# Patient Record
Sex: FEMALE | Race: WHITE | Employment: OTHER | URBAN - NONMETROPOLITAN AREA
[De-identification: names, ages, dates, MRNs, and addresses within clinical notes are randomized per-mention and may not be internally consistent; named-entity substitution may affect disease eponyms.]

---

## 2020-04-07 ENCOUNTER — HOSPITAL ENCOUNTER (EMERGENCY)
Age: 85
Discharge: HOME | End: 2020-04-07
Attending: EMERGENCY MEDICINE
Payer: MEDICARE

## 2020-04-07 ENCOUNTER — APPOINTMENT (OUTPATIENT)
Dept: CT IMAGING | Age: 85
End: 2020-04-07
Payer: MEDICARE

## 2020-04-07 VITALS
SYSTOLIC BLOOD PRESSURE: 143 MMHG | RESPIRATION RATE: 16 BRPM | TEMPERATURE: 96.4 F | HEART RATE: 69 BPM | HEIGHT: 66 IN | BODY MASS INDEX: 27.32 KG/M2 | WEIGHT: 170 LBS | OXYGEN SATURATION: 95 % | DIASTOLIC BLOOD PRESSURE: 74 MMHG

## 2020-04-07 PROCEDURE — 72125 CT NECK SPINE W/O DYE: CPT

## 2020-04-07 PROCEDURE — 90471 IMMUNIZATION ADMIN: CPT | Performed by: EMERGENCY MEDICINE

## 2020-04-07 PROCEDURE — 6370000000 HC RX 637 (ALT 250 FOR IP): Performed by: EMERGENCY MEDICINE

## 2020-04-07 PROCEDURE — 99283 EMERGENCY DEPT VISIT LOW MDM: CPT

## 2020-04-07 PROCEDURE — 12001 RPR S/N/AX/GEN/TRNK 2.5CM/<: CPT

## 2020-04-07 PROCEDURE — 70450 CT HEAD/BRAIN W/O DYE: CPT

## 2020-04-07 PROCEDURE — 90715 TDAP VACCINE 7 YRS/> IM: CPT | Performed by: EMERGENCY MEDICINE

## 2020-04-07 PROCEDURE — 6360000002 HC RX W HCPCS: Performed by: EMERGENCY MEDICINE

## 2020-04-07 RX ORDER — CEPHALEXIN 500 MG/1
500 CAPSULE ORAL 3 TIMES DAILY
Qty: 15 CAPSULE | Refills: 0 | Status: SHIPPED | OUTPATIENT
Start: 2020-04-07 | End: 2022-01-01

## 2020-04-07 RX ORDER — CEPHALEXIN 250 MG/1
500 CAPSULE ORAL ONCE
Status: COMPLETED | OUTPATIENT
Start: 2020-04-07 | End: 2020-04-07

## 2020-04-07 RX ADMIN — TETANUS TOXOID, REDUCED DIPHTHERIA TOXOID AND ACELLULAR PERTUSSIS VACCINE, ADSORBED 0.5 ML: 5; 2.5; 8; 8; 2.5 SUSPENSION INTRAMUSCULAR at 05:27

## 2020-04-07 RX ADMIN — CEPHALEXIN 500 MG: 250 CAPSULE ORAL at 05:27

## 2020-04-07 ASSESSMENT — ENCOUNTER SYMPTOMS
COUGH: 0
BACK PAIN: 0
DIARRHEA: 0
SORE THROAT: 0
EYE PAIN: 0
SHORTNESS OF BREATH: 0
RHINORRHEA: 0
NAUSEA: 0
ABDOMINAL PAIN: 0
VOMITING: 0

## 2020-04-07 NOTE — ED NOTES
3 sutures placed to back of patient's head by Dr. Myriam Dawn to control bleeding.      Latasha Hurtado RN  04/07/20 0729

## 2020-04-07 NOTE — ED TRIAGE NOTES
Patient presents to ED from private auto via wheelchair for a head lac secondary to a fall. The patient states that she got up to use the bathroom and lost her balance. She fell backwards and struck her head on the sink. She denies LOC or use of blood thinning medications. She is alert and oriented on arrival.  Denies pain at this time.

## 2020-04-07 NOTE — ED PROVIDER NOTES
immediate return. The patient was advised to keep the wound clean and dry, they may apply antibiotic ointment to the wound. The patient presents with a closed head injury. The patient is neurologically intact. The presentation does not suggest a serious head injury. Signs and symptoms of a serious head injury have been discussed with the patient and caregiver, who will be vigilant for these. Concerns of repeat head injury have also been discussed. The patient has been observed adequately in the ED. Pt has been instructed to return to the ED if symptoms do not go away or worsen or change in any way. The patient understands that at this time there is no evidence for a more malignant underlying process, but the patient also understands that early in the process of an illness or injury, an emergency department workup can be falsely reassuring. Routine discharge counseling was given, and the patient understands that worsening, changing or persistent symptoms should prompt an immediate call or follow up with their primary physician or return to the emergency department. The importance of appropriate follow up was also discussed. I have reviewed the disposition diagnosis with the patient and or their family/guardian. I have answered their questions and given discharge instructions. They voiced understanding of these instructions and did not have any further questions or complaints. CONSULTS:    None    CRITICAL CARE:     None    PROCEDURES:    Lac Repair  Date/Time: 4/7/2020 4:15 AM  Performed by: Celestino Garcia DO  Authorized by: Celestino Garcia DO     Consent:     Consent obtained:  Verbal    Consent given by:  Patient    Risks discussed:  Pain and infection  Anesthesia (see MAR for exact dosages):      Anesthesia method:  None (emergent )  Laceration details:     Location:  Scalp    Scalp location:  Occipital    Length (cm):  2    Depth (mm):  7  Repair type:     Repair type:

## 2021-01-22 ENCOUNTER — IMMUNIZATION (OUTPATIENT)
Dept: LAB | Age: 86
End: 2021-01-22
Payer: MEDICARE

## 2021-01-22 PROCEDURE — 0011A COVID-19, MODERNA VACCINE 100MCG/0.5ML DOSE: CPT

## 2021-02-19 ENCOUNTER — IMMUNIZATION (OUTPATIENT)
Dept: LAB | Age: 86
End: 2021-02-19
Payer: MEDICARE

## 2021-02-19 PROCEDURE — 0012A: CPT

## 2021-02-19 PROCEDURE — 91301 HC SARSCOV2 VAC 100MCG/0.5ML IM: CPT

## 2021-02-19 PROCEDURE — 91301 COVID-19, MODERNA VACCINE 100MCG/0.5ML DOSE: CPT

## 2022-01-01 ENCOUNTER — TELEPHONE (OUTPATIENT)
Dept: INTERNAL MEDICINE | Age: 87
End: 2022-01-01
Payer: MEDICARE

## 2022-01-01 ENCOUNTER — PATIENT MESSAGE (OUTPATIENT)
Dept: INTERNAL MEDICINE | Age: 87
End: 2022-01-01

## 2022-01-01 ENCOUNTER — TELEPHONE (OUTPATIENT)
Dept: OTHER | Facility: CLINIC | Age: 87
End: 2022-01-01

## 2022-01-01 ENCOUNTER — HOSPITAL ENCOUNTER (INPATIENT)
Age: 87
LOS: 4 days | Discharge: HOME | DRG: 603 | End: 2022-07-03
Attending: EMERGENCY MEDICINE | Admitting: INTERNAL MEDICINE
Payer: MEDICARE

## 2022-01-01 ENCOUNTER — APPOINTMENT (OUTPATIENT)
Dept: GENERAL RADIOLOGY | Age: 87
End: 2022-01-01
Payer: MEDICARE

## 2022-01-01 ENCOUNTER — TELEPHONE (OUTPATIENT)
Dept: INTERNAL MEDICINE | Age: 87
End: 2022-01-01

## 2022-01-01 ENCOUNTER — HOSPITAL ENCOUNTER (OUTPATIENT)
Dept: LAB | Age: 87
Discharge: HOME | End: 2022-04-07
Payer: MEDICARE

## 2022-01-01 ENCOUNTER — HOSPITAL ENCOUNTER (OUTPATIENT)
Age: 87
Setting detail: SPECIMEN
Discharge: HOME | End: 2022-02-19
Payer: MEDICARE

## 2022-01-01 ENCOUNTER — TELEPHONE (OUTPATIENT)
Dept: CARDIOLOGY | Age: 87
End: 2022-01-01

## 2022-01-01 ENCOUNTER — HOSPITAL ENCOUNTER (OUTPATIENT)
Dept: INTERVENTIONAL RADIOLOGY/VASCULAR | Age: 87
Discharge: HOME | End: 2022-07-16
Payer: MEDICARE

## 2022-01-01 ENCOUNTER — HOSPITAL ENCOUNTER (OUTPATIENT)
Dept: WOUND CARE | Age: 87
Discharge: HOME | DRG: 603 | End: 2022-06-28
Payer: MEDICARE

## 2022-01-01 ENCOUNTER — APPOINTMENT (OUTPATIENT)
Dept: WOUND CARE | Age: 87
End: 2022-01-01
Payer: MEDICARE

## 2022-01-01 ENCOUNTER — OFFICE VISIT (OUTPATIENT)
Dept: PRIMARY CARE CLINIC | Age: 87
End: 2022-01-01
Payer: MEDICARE

## 2022-01-01 ENCOUNTER — OFFICE VISIT (OUTPATIENT)
Dept: INTERNAL MEDICINE | Age: 87
End: 2022-01-01
Payer: MEDICARE

## 2022-01-01 ENCOUNTER — APPOINTMENT (OUTPATIENT)
Dept: INTERVENTIONAL RADIOLOGY/VASCULAR | Age: 87
End: 2022-01-01
Payer: MEDICARE

## 2022-01-01 ENCOUNTER — HOSPITAL ENCOUNTER (OUTPATIENT)
Dept: WOUND CARE | Age: 87
Discharge: HOME | End: 2022-07-12
Payer: MEDICARE

## 2022-01-01 ENCOUNTER — APPOINTMENT (OUTPATIENT)
Dept: INTERVENTIONAL RADIOLOGY/VASCULAR | Age: 87
DRG: 603 | End: 2022-01-01
Payer: MEDICARE

## 2022-01-01 ENCOUNTER — APPOINTMENT (OUTPATIENT)
Dept: CT IMAGING | Age: 87
End: 2022-01-01
Payer: MEDICARE

## 2022-01-01 ENCOUNTER — HOSPITAL ENCOUNTER (OUTPATIENT)
Dept: GENERAL RADIOLOGY | Age: 87
Discharge: HOME | End: 2022-07-16
Payer: MEDICARE

## 2022-01-01 ENCOUNTER — HOSPITAL ENCOUNTER (EMERGENCY)
Age: 87
Discharge: HOME | End: 2022-04-28
Attending: EMERGENCY MEDICINE
Payer: MEDICARE

## 2022-01-01 ENCOUNTER — HOSPITAL ENCOUNTER (OUTPATIENT)
Age: 87
Setting detail: SPECIMEN
Discharge: HOME | End: 2022-06-06
Payer: MEDICARE

## 2022-01-01 ENCOUNTER — OFFICE VISIT (OUTPATIENT)
Dept: VASCULAR SURGERY | Age: 87
End: 2022-01-01
Payer: MEDICARE

## 2022-01-01 ENCOUNTER — HOSPITAL ENCOUNTER (INPATIENT)
Age: 87
LOS: 3 days | Discharge: HOME | DRG: 291 | End: 2022-02-11
Attending: INTERNAL MEDICINE | Admitting: INTERNAL MEDICINE
Payer: MEDICARE

## 2022-01-01 ENCOUNTER — HOSPITAL ENCOUNTER (OUTPATIENT)
Age: 87
Setting detail: SPECIMEN
Discharge: HOME | End: 2022-07-14
Payer: MEDICARE

## 2022-01-01 ENCOUNTER — OFFICE VISIT (OUTPATIENT)
Dept: INTERNAL MEDICINE | Age: 87
DRG: 291 | End: 2022-01-01
Payer: MEDICARE

## 2022-01-01 ENCOUNTER — APPOINTMENT (OUTPATIENT)
Dept: GENERAL RADIOLOGY | Age: 87
DRG: 603 | End: 2022-01-01
Payer: MEDICARE

## 2022-01-01 ENCOUNTER — OFFICE VISIT (OUTPATIENT)
Dept: CARDIOLOGY | Age: 87
End: 2022-01-01
Payer: MEDICARE

## 2022-01-01 ENCOUNTER — TELEPHONE (OUTPATIENT)
Dept: ULTRASOUND IMAGING | Age: 87
End: 2022-01-01

## 2022-01-01 ENCOUNTER — HOSPITAL ENCOUNTER (OUTPATIENT)
Dept: LAB | Age: 87
Discharge: HOME | End: 2022-03-03
Payer: MEDICARE

## 2022-01-01 ENCOUNTER — HOSPITAL ENCOUNTER (OUTPATIENT)
Dept: LAB | Age: 87
Discharge: HOME | DRG: 291 | End: 2022-02-08
Payer: MEDICARE

## 2022-01-01 ENCOUNTER — TELEPHONE (OUTPATIENT)
Dept: SURGERY | Age: 87
End: 2022-01-01

## 2022-01-01 ENCOUNTER — HOSPITAL ENCOUNTER (OUTPATIENT)
Dept: LAB | Age: 87
Discharge: HOME | End: 2022-06-06
Payer: MEDICARE

## 2022-01-01 ENCOUNTER — HOSPITAL ENCOUNTER (OUTPATIENT)
Dept: WOUND CARE | Age: 87
Discharge: HOME | End: 2022-07-19
Payer: MEDICARE

## 2022-01-01 ENCOUNTER — HOSPITAL ENCOUNTER (EMERGENCY)
Age: 87
Discharge: HOME | End: 2022-07-21
Attending: EMERGENCY MEDICINE
Payer: MEDICARE

## 2022-01-01 ENCOUNTER — HOSPITAL ENCOUNTER (OUTPATIENT)
Dept: CARDIAC CATH/INVASIVE PROCEDURES | Age: 87
Discharge: HOME | End: 2022-02-10
Payer: COMMERCIAL

## 2022-01-01 ENCOUNTER — HOSPITAL ENCOUNTER (OUTPATIENT)
Dept: WOUND CARE | Age: 87
Discharge: HOME | DRG: 603 | End: 2022-07-06
Payer: MEDICARE

## 2022-01-01 ENCOUNTER — HOSPITAL ENCOUNTER (OUTPATIENT)
Dept: GENERAL RADIOLOGY | Age: 87
Discharge: HOME | DRG: 291 | End: 2022-02-10
Payer: MEDICARE

## 2022-01-01 ENCOUNTER — HOSPITAL ENCOUNTER (OUTPATIENT)
Dept: LAB | Age: 87
Discharge: HOME | End: 2022-06-16
Payer: MEDICARE

## 2022-01-01 ENCOUNTER — APPOINTMENT (OUTPATIENT)
Dept: GENERAL RADIOLOGY | Age: 87
DRG: 291 | End: 2022-01-01
Attending: INTERNAL MEDICINE
Payer: MEDICARE

## 2022-01-01 ENCOUNTER — HOSPITAL ENCOUNTER (OUTPATIENT)
Age: 87
Setting detail: SPECIMEN
Discharge: HOME | DRG: 291 | End: 2022-02-08
Payer: MEDICARE

## 2022-01-01 ENCOUNTER — HOSPITAL ENCOUNTER (OUTPATIENT)
Dept: ULTRASOUND IMAGING | Age: 87
Discharge: HOME | End: 2022-06-08
Payer: MEDICARE

## 2022-01-01 ENCOUNTER — HOSPITAL ENCOUNTER (OUTPATIENT)
Dept: GENERAL RADIOLOGY | Age: 87
Discharge: HOME | End: 2022-07-07
Payer: MEDICARE

## 2022-01-01 ENCOUNTER — APPOINTMENT (OUTPATIENT)
Dept: CT IMAGING | Age: 87
DRG: 603 | End: 2022-01-01
Payer: MEDICARE

## 2022-01-01 VITALS
BODY MASS INDEX: 32.32 KG/M2 | DIASTOLIC BLOOD PRESSURE: 74 MMHG | HEIGHT: 65 IN | RESPIRATION RATE: 16 BRPM | OXYGEN SATURATION: 94 % | WEIGHT: 194 LBS | SYSTOLIC BLOOD PRESSURE: 120 MMHG | HEART RATE: 80 BPM | TEMPERATURE: 97.1 F

## 2022-01-01 VITALS
WEIGHT: 212 LBS | SYSTOLIC BLOOD PRESSURE: 124 MMHG | HEIGHT: 66 IN | DIASTOLIC BLOOD PRESSURE: 76 MMHG | BODY MASS INDEX: 34.07 KG/M2 | RESPIRATION RATE: 16 BRPM | OXYGEN SATURATION: 93 % | HEART RATE: 72 BPM

## 2022-01-01 VITALS
BODY MASS INDEX: 31.16 KG/M2 | SYSTOLIC BLOOD PRESSURE: 122 MMHG | WEIGHT: 187 LBS | DIASTOLIC BLOOD PRESSURE: 80 MMHG | HEIGHT: 65 IN | HEART RATE: 80 BPM

## 2022-01-01 VITALS
SYSTOLIC BLOOD PRESSURE: 122 MMHG | RESPIRATION RATE: 18 BRPM | TEMPERATURE: 98.2 F | WEIGHT: 187 LBS | DIASTOLIC BLOOD PRESSURE: 82 MMHG | HEIGHT: 65 IN | HEART RATE: 79 BPM | BODY MASS INDEX: 31.16 KG/M2 | OXYGEN SATURATION: 90 %

## 2022-01-01 VITALS
TEMPERATURE: 97.7 F | HEIGHT: 65 IN | WEIGHT: 180 LBS | BODY MASS INDEX: 29.99 KG/M2 | SYSTOLIC BLOOD PRESSURE: 123 MMHG | DIASTOLIC BLOOD PRESSURE: 88 MMHG | OXYGEN SATURATION: 94 % | HEART RATE: 87 BPM | RESPIRATION RATE: 18 BRPM

## 2022-01-01 VITALS
WEIGHT: 192 LBS | SYSTOLIC BLOOD PRESSURE: 108 MMHG | HEIGHT: 65 IN | DIASTOLIC BLOOD PRESSURE: 60 MMHG | BODY MASS INDEX: 31.99 KG/M2 | HEART RATE: 68 BPM | RESPIRATION RATE: 18 BRPM | TEMPERATURE: 98.2 F

## 2022-01-01 VITALS
BODY MASS INDEX: 33.8 KG/M2 | TEMPERATURE: 98.3 F | HEIGHT: 66 IN | WEIGHT: 210.3 LBS | SYSTOLIC BLOOD PRESSURE: 126 MMHG | DIASTOLIC BLOOD PRESSURE: 86 MMHG | OXYGEN SATURATION: 95 % | HEART RATE: 84 BPM | RESPIRATION RATE: 23 BRPM

## 2022-01-01 VITALS
HEART RATE: 86 BPM | WEIGHT: 200.5 LBS | BODY MASS INDEX: 33.41 KG/M2 | RESPIRATION RATE: 16 BRPM | OXYGEN SATURATION: 96 % | SYSTOLIC BLOOD PRESSURE: 118 MMHG | HEIGHT: 65 IN | TEMPERATURE: 97.2 F | DIASTOLIC BLOOD PRESSURE: 72 MMHG

## 2022-01-01 VITALS
WEIGHT: 195 LBS | TEMPERATURE: 97.8 F | BODY MASS INDEX: 32.49 KG/M2 | RESPIRATION RATE: 16 BRPM | HEIGHT: 65 IN | OXYGEN SATURATION: 97 % | DIASTOLIC BLOOD PRESSURE: 60 MMHG | SYSTOLIC BLOOD PRESSURE: 102 MMHG | HEART RATE: 70 BPM

## 2022-01-01 VITALS
SYSTOLIC BLOOD PRESSURE: 116 MMHG | HEIGHT: 65 IN | BODY MASS INDEX: 31.36 KG/M2 | DIASTOLIC BLOOD PRESSURE: 80 MMHG | HEART RATE: 76 BPM | TEMPERATURE: 97.8 F | WEIGHT: 188.2 LBS | RESPIRATION RATE: 18 BRPM

## 2022-01-01 VITALS
DIASTOLIC BLOOD PRESSURE: 73 MMHG | RESPIRATION RATE: 21 BRPM | OXYGEN SATURATION: 98 % | HEART RATE: 82 BPM | SYSTOLIC BLOOD PRESSURE: 121 MMHG

## 2022-01-01 VITALS
SYSTOLIC BLOOD PRESSURE: 122 MMHG | DIASTOLIC BLOOD PRESSURE: 80 MMHG | HEIGHT: 65 IN | HEART RATE: 84 BPM | BODY MASS INDEX: 32.42 KG/M2 | WEIGHT: 194.6 LBS

## 2022-01-01 VITALS
OXYGEN SATURATION: 99 % | BODY MASS INDEX: 31.62 KG/M2 | HEART RATE: 85 BPM | SYSTOLIC BLOOD PRESSURE: 90 MMHG | WEIGHT: 190 LBS | DIASTOLIC BLOOD PRESSURE: 50 MMHG | RESPIRATION RATE: 20 BRPM

## 2022-01-01 VITALS
RESPIRATION RATE: 29 BRPM | OXYGEN SATURATION: 95 % | BODY MASS INDEX: 31.94 KG/M2 | DIASTOLIC BLOOD PRESSURE: 73 MMHG | SYSTOLIC BLOOD PRESSURE: 114 MMHG | HEIGHT: 65 IN | TEMPERATURE: 98 F | WEIGHT: 191.7 LBS | HEART RATE: 112 BPM

## 2022-01-01 VITALS
TEMPERATURE: 97.7 F | DIASTOLIC BLOOD PRESSURE: 68 MMHG | SYSTOLIC BLOOD PRESSURE: 115 MMHG | BODY MASS INDEX: 31.62 KG/M2 | RESPIRATION RATE: 18 BRPM | HEIGHT: 65 IN | HEART RATE: 64 BPM

## 2022-01-01 VITALS
SYSTOLIC BLOOD PRESSURE: 124 MMHG | TEMPERATURE: 98 F | DIASTOLIC BLOOD PRESSURE: 76 MMHG | HEART RATE: 80 BPM | RESPIRATION RATE: 16 BRPM

## 2022-01-01 VITALS
DIASTOLIC BLOOD PRESSURE: 78 MMHG | HEIGHT: 65 IN | BODY MASS INDEX: 32.45 KG/M2 | RESPIRATION RATE: 18 BRPM | SYSTOLIC BLOOD PRESSURE: 130 MMHG | HEART RATE: 88 BPM | TEMPERATURE: 97.8 F

## 2022-01-01 VITALS
RESPIRATION RATE: 16 BRPM | SYSTOLIC BLOOD PRESSURE: 124 MMHG | HEART RATE: 78 BPM | DIASTOLIC BLOOD PRESSURE: 80 MMHG | TEMPERATURE: 96.8 F | WEIGHT: 184.6 LBS | OXYGEN SATURATION: 95 % | BODY MASS INDEX: 30.75 KG/M2 | HEIGHT: 65 IN

## 2022-01-01 VITALS
OXYGEN SATURATION: 94 % | HEIGHT: 65 IN | HEART RATE: 84 BPM | DIASTOLIC BLOOD PRESSURE: 55 MMHG | SYSTOLIC BLOOD PRESSURE: 94 MMHG | WEIGHT: 195.7 LBS | RESPIRATION RATE: 18 BRPM | BODY MASS INDEX: 32.61 KG/M2 | TEMPERATURE: 98.7 F

## 2022-01-01 VITALS
WEIGHT: 184 LBS | HEART RATE: 74 BPM | SYSTOLIC BLOOD PRESSURE: 124 MMHG | HEIGHT: 65 IN | BODY MASS INDEX: 30.66 KG/M2 | DIASTOLIC BLOOD PRESSURE: 70 MMHG

## 2022-01-01 VITALS — SYSTOLIC BLOOD PRESSURE: 138 MMHG | DIASTOLIC BLOOD PRESSURE: 90 MMHG | HEART RATE: 97 BPM

## 2022-01-01 DIAGNOSIS — I50.43 CHF (CONGESTIVE HEART FAILURE), NYHA CLASS I, ACUTE ON CHRONIC, COMBINED (HCC): Primary | ICD-10-CM

## 2022-01-01 DIAGNOSIS — I48.19 PERSISTENT ATRIAL FIBRILLATION (HCC): ICD-10-CM

## 2022-01-01 DIAGNOSIS — E03.9 HYPOTHYROIDISM, UNSPECIFIED TYPE: ICD-10-CM

## 2022-01-01 DIAGNOSIS — R23.0 BLUE TOES: ICD-10-CM

## 2022-01-01 DIAGNOSIS — I48.91 ATRIAL FIBRILLATION, UNSPECIFIED TYPE (HCC): ICD-10-CM

## 2022-01-01 DIAGNOSIS — L03.115 BILATERAL LOWER LEG CELLULITIS: Primary | ICD-10-CM

## 2022-01-01 DIAGNOSIS — I50.43 CHF (CONGESTIVE HEART FAILURE), NYHA CLASS I, ACUTE ON CHRONIC, COMBINED (HCC): ICD-10-CM

## 2022-01-01 DIAGNOSIS — J90 PLEURAL EFFUSION ON RIGHT: Primary | ICD-10-CM

## 2022-01-01 DIAGNOSIS — L03.115 BILATERAL LOWER LEG CELLULITIS: ICD-10-CM

## 2022-01-01 DIAGNOSIS — L03.116 CELLULITIS OF LEFT LOWER EXTREMITY: Primary | ICD-10-CM

## 2022-01-01 DIAGNOSIS — R60.0 BILATERAL LOWER EXTREMITY EDEMA: Primary | ICD-10-CM

## 2022-01-01 DIAGNOSIS — J90 PLEURAL EFFUSION ON RIGHT: ICD-10-CM

## 2022-01-01 DIAGNOSIS — I34.0 MODERATE MITRAL VALVE REGURGITATION: ICD-10-CM

## 2022-01-01 DIAGNOSIS — I50.32 CHRONIC DIASTOLIC CONGESTIVE HEART FAILURE (HCC): Primary | ICD-10-CM

## 2022-01-01 DIAGNOSIS — R41.82 ALTERED MENTAL STATUS, UNSPECIFIED ALTERED MENTAL STATUS TYPE: Primary | ICD-10-CM

## 2022-01-01 DIAGNOSIS — E03.9 ACQUIRED HYPOTHYROIDISM: Primary | ICD-10-CM

## 2022-01-01 DIAGNOSIS — R06.09 DOE (DYSPNEA ON EXERTION): ICD-10-CM

## 2022-01-01 DIAGNOSIS — I07.1 MODERATE TRICUSPID REGURGITATION: ICD-10-CM

## 2022-01-01 DIAGNOSIS — Z00.00 INITIAL MEDICARE ANNUAL WELLNESS VISIT: Primary | ICD-10-CM

## 2022-01-01 DIAGNOSIS — I50.41 ACUTE COMBINED SYSTOLIC (CONGESTIVE) AND DIASTOLIC (CONGESTIVE) HEART FAILURE (HCC): ICD-10-CM

## 2022-01-01 DIAGNOSIS — I50.32 CHRONIC DIASTOLIC CONGESTIVE HEART FAILURE (HCC): ICD-10-CM

## 2022-01-01 DIAGNOSIS — I99.8 ISCHEMIA OF FOOT: ICD-10-CM

## 2022-01-01 DIAGNOSIS — E03.9 HYPOTHYROIDISM, UNSPECIFIED TYPE: Primary | ICD-10-CM

## 2022-01-01 DIAGNOSIS — R60.0 BILATERAL LOWER EXTREMITY EDEMA: ICD-10-CM

## 2022-01-01 DIAGNOSIS — S91.309A WOUND OF FOOT: Primary | ICD-10-CM

## 2022-01-01 DIAGNOSIS — J90 PLEURAL EFFUSION: ICD-10-CM

## 2022-01-01 DIAGNOSIS — L03.119 CELLULITIS OF LOWER EXTREMITY, UNSPECIFIED LATERALITY: ICD-10-CM

## 2022-01-01 DIAGNOSIS — R60.9 EDEMA, UNSPECIFIED TYPE: ICD-10-CM

## 2022-01-01 DIAGNOSIS — N18.31 STAGE 3A CHRONIC KIDNEY DISEASE (HCC): ICD-10-CM

## 2022-01-01 DIAGNOSIS — K66.8 PNEUMOPERITONEUM: Primary | ICD-10-CM

## 2022-01-01 DIAGNOSIS — J90 RECURRENT RIGHT PLEURAL EFFUSION: ICD-10-CM

## 2022-01-01 DIAGNOSIS — R09.02 HYPOXIA: ICD-10-CM

## 2022-01-01 DIAGNOSIS — L97.511 ULCER OF RIGHT FOOT, LIMITED TO BREAKDOWN OF SKIN (HCC): ICD-10-CM

## 2022-01-01 DIAGNOSIS — E03.9 ACQUIRED HYPOTHYROIDISM: ICD-10-CM

## 2022-01-01 DIAGNOSIS — L97.921 ULCER OF LEFT LOWER LEG, LIMITED TO BREAKDOWN OF SKIN (HCC): ICD-10-CM

## 2022-01-01 DIAGNOSIS — L03.115 CELLULITIS OF RIGHT LOWER LIMB: ICD-10-CM

## 2022-01-01 DIAGNOSIS — Z00.00 ROUTINE PHYSICAL EXAMINATION: ICD-10-CM

## 2022-01-01 DIAGNOSIS — S91.309A WOUND OF FOOT: ICD-10-CM

## 2022-01-01 DIAGNOSIS — I50.9 ACUTE ON CHRONIC CONGESTIVE HEART FAILURE, UNSPECIFIED HEART FAILURE TYPE (HCC): Primary | ICD-10-CM

## 2022-01-01 DIAGNOSIS — I48.91 NEW ONSET ATRIAL FIBRILLATION (HCC): Primary | ICD-10-CM

## 2022-01-01 DIAGNOSIS — Z11.52 ENCOUNTER FOR SCREENING FOR COVID-19: ICD-10-CM

## 2022-01-01 DIAGNOSIS — I89.0 LYMPHEDEMA OF BOTH LOWER EXTREMITIES: ICD-10-CM

## 2022-01-01 DIAGNOSIS — I50.43 ACUTE ON CHRONIC COMBINED SYSTOLIC AND DIASTOLIC HEART FAILURE (HCC): ICD-10-CM

## 2022-01-01 DIAGNOSIS — L97.921 ULCER OF LEFT LOWER LEG, LIMITED TO BREAKDOWN OF SKIN (HCC): Primary | ICD-10-CM

## 2022-01-01 DIAGNOSIS — I50.42 CHF NYHA CLASS I, CHRONIC, COMBINED (HCC): ICD-10-CM

## 2022-01-01 DIAGNOSIS — I73.9 PVD (PERIPHERAL VASCULAR DISEASE) (HCC): ICD-10-CM

## 2022-01-01 DIAGNOSIS — L03.116 CELLULITIS OF LEFT LOWER EXTREMITY: ICD-10-CM

## 2022-01-01 DIAGNOSIS — I73.9 PERIPHERAL VASCULAR DISEASE, UNSPECIFIED (HCC): ICD-10-CM

## 2022-01-01 DIAGNOSIS — L03.115 CELLULITIS OF RIGHT ANTERIOR LOWER LEG: Primary | ICD-10-CM

## 2022-01-01 DIAGNOSIS — L97.821 NON-PRESSURE CHRONIC ULCER OF OTHER PART OF LEFT LOWER LEG LIMITED TO BREAKDOWN OF SKIN (HCC): ICD-10-CM

## 2022-01-01 DIAGNOSIS — L03.116 BILATERAL LOWER LEG CELLULITIS: ICD-10-CM

## 2022-01-01 DIAGNOSIS — I89.0 LYMPHEDEMA: ICD-10-CM

## 2022-01-01 DIAGNOSIS — L81.9 DISCOLORATION OF SKIN OF FOOT: ICD-10-CM

## 2022-01-01 DIAGNOSIS — L03.115 CELLULITIS OF LEG, RIGHT: ICD-10-CM

## 2022-01-01 DIAGNOSIS — L97.511 NON-PRESSURE CHRONIC ULCER OF OTHER PART OF RIGHT FOOT LIMITED TO BREAKDOWN OF SKIN (HCC): ICD-10-CM

## 2022-01-01 DIAGNOSIS — L03.115 CELLULITIS OF RIGHT ANTERIOR LOWER LEG: ICD-10-CM

## 2022-01-01 DIAGNOSIS — L03.90 WOUND CELLULITIS: ICD-10-CM

## 2022-01-01 DIAGNOSIS — I48.0 PAROXYSMAL ATRIAL FIBRILLATION (HCC): Primary | ICD-10-CM

## 2022-01-01 DIAGNOSIS — R09.02 HYPOXEMIA: ICD-10-CM

## 2022-01-01 DIAGNOSIS — L03.116 BILATERAL LOWER LEG CELLULITIS: Primary | ICD-10-CM

## 2022-01-01 LAB
-: ABNORMAL
ABSOLUTE EOS #: 0 K/UL (ref 0–0.4)
ABSOLUTE EOS #: 0.04 K/UL (ref 0–0.44)
ABSOLUTE EOS #: 0.05 K/UL (ref 0–0.44)
ABSOLUTE EOS #: 0.05 K/UL (ref 0–0.44)
ABSOLUTE EOS #: 0.06 K/UL (ref 0–0.44)
ABSOLUTE EOS #: 0.06 K/UL (ref 0–0.44)
ABSOLUTE EOS #: 0.07 K/UL (ref 0–0.44)
ABSOLUTE EOS #: 0.19 K/UL (ref 0–0.44)
ABSOLUTE EOS #: <0.03 K/UL (ref 0–0.44)
ABSOLUTE IMMATURE GRANULOCYTE: 0 K/UL (ref 0–0.3)
ABSOLUTE IMMATURE GRANULOCYTE: 0 K/UL (ref 0–0.3)
ABSOLUTE IMMATURE GRANULOCYTE: 0.03 K/UL (ref 0–0.3)
ABSOLUTE IMMATURE GRANULOCYTE: 0.04 K/UL (ref 0–0.3)
ABSOLUTE IMMATURE GRANULOCYTE: <0.03 K/UL (ref 0–0.3)
ABSOLUTE LYMPH #: 0.42 K/UL (ref 1–4.8)
ABSOLUTE LYMPH #: 0.6 K/UL (ref 1.1–3.7)
ABSOLUTE LYMPH #: 0.75 K/UL (ref 1.1–3.7)
ABSOLUTE LYMPH #: 0.77 K/UL (ref 1.1–3.7)
ABSOLUTE LYMPH #: 0.77 K/UL (ref 1.1–3.7)
ABSOLUTE LYMPH #: 0.8 K/UL (ref 1.1–3.7)
ABSOLUTE LYMPH #: 0.82 K/UL (ref 1.1–3.7)
ABSOLUTE LYMPH #: 0.83 K/UL (ref 1.1–3.7)
ABSOLUTE LYMPH #: 0.88 K/UL (ref 1.1–3.7)
ABSOLUTE LYMPH #: 1.02 K/UL (ref 1.1–3.7)
ABSOLUTE LYMPH #: 1.06 K/UL (ref 1.1–3.7)
ABSOLUTE MONO #: 0.04 K/UL (ref 0.1–1.2)
ABSOLUTE MONO #: 0.47 K/UL (ref 0.1–1.2)
ABSOLUTE MONO #: 0.55 K/UL (ref 0.1–1.2)
ABSOLUTE MONO #: 0.58 K/UL (ref 0.1–1.2)
ABSOLUTE MONO #: 0.66 K/UL (ref 0.1–1.2)
ABSOLUTE MONO #: 0.67 K/UL (ref 0.1–1.2)
ABSOLUTE MONO #: 0.7 K/UL (ref 0.1–1.2)
ABSOLUTE MONO #: 0.71 K/UL (ref 0.1–1.2)
ABSOLUTE MONO #: 0.72 K/UL (ref 0.1–1.2)
ABSOLUTE MONO #: 0.75 K/UL (ref 0.1–1.2)
ABSOLUTE MONO #: 0.79 K/UL (ref 0.1–1.2)
ALBUMIN SERPL-MCNC: 3.1 G/DL (ref 3.5–5.2)
ALBUMIN SERPL-MCNC: 3.4 G/DL (ref 3.5–5.2)
ALBUMIN SERPL-MCNC: 3.6 G/DL (ref 3.5–5.2)
ALBUMIN SERPL-MCNC: 3.8 G/DL (ref 3.5–5.2)
ALBUMIN SERPL-MCNC: 3.9 G/DL (ref 3.5–5.2)
ALBUMIN SERPL-MCNC: 4 G/DL (ref 3.5–5.2)
ALBUMIN SERPL-MCNC: 4.1 G/DL (ref 3.5–5.2)
ALBUMIN/GLOBULIN RATIO: 1 (ref 1–2.5)
ALBUMIN/GLOBULIN RATIO: 1 (ref 1–2.5)
ALBUMIN/GLOBULIN RATIO: 1.2 (ref 1–2.5)
ALBUMIN/GLOBULIN RATIO: 1.4 (ref 1–2.5)
ALP BLD-CCNC: 104 U/L (ref 35–104)
ALP BLD-CCNC: 108 U/L (ref 35–104)
ALP BLD-CCNC: 116 U/L (ref 35–104)
ALP BLD-CCNC: 128 U/L (ref 35–104)
ALP BLD-CCNC: 139 U/L (ref 35–104)
ALP BLD-CCNC: 152 U/L (ref 35–104)
ALP BLD-CCNC: 91 U/L (ref 35–104)
ALT SERPL-CCNC: 15 U/L (ref 5–33)
ALT SERPL-CCNC: 16 U/L (ref 5–33)
ALT SERPL-CCNC: 19 U/L (ref 5–33)
ALT SERPL-CCNC: 20 U/L (ref 5–33)
ALT SERPL-CCNC: 25 U/L (ref 5–33)
ALT SERPL-CCNC: 25 U/L (ref 5–33)
ALT SERPL-CCNC: 28 U/L (ref 5–33)
AMMONIA: 21 UMOL/L (ref 11–51)
AMORPHOUS: ABNORMAL
AMYLASE FLUID: 30 U/L
AMYLASE: 37 U/L (ref 28–100)
ANION GAP SERPL CALCULATED.3IONS-SCNC: 11 MMOL/L (ref 9–17)
ANION GAP SERPL CALCULATED.3IONS-SCNC: 12 MMOL/L (ref 9–17)
ANION GAP SERPL CALCULATED.3IONS-SCNC: 13 MMOL/L (ref 9–17)
ANION GAP SERPL CALCULATED.3IONS-SCNC: 15 MMOL/L (ref 9–17)
ANION GAP SERPL CALCULATED.3IONS-SCNC: 7 MMOL/L (ref 9–17)
ANION GAP SERPL CALCULATED.3IONS-SCNC: 8 MMOL/L (ref 9–17)
ANION GAP SERPL CALCULATED.3IONS-SCNC: 9 MMOL/L (ref 9–17)
AST SERPL-CCNC: 32 U/L
AST SERPL-CCNC: 33 U/L
AST SERPL-CCNC: 39 U/L
AST SERPL-CCNC: 40 U/L
AST SERPL-CCNC: 43 U/L
AST SERPL-CCNC: 43 U/L
AST SERPL-CCNC: 66 U/L
BACTERIA: ABNORMAL
BASOPHILS # BLD: 0 % (ref 0–1)
BASOPHILS # BLD: 0 % (ref 0–2)
BASOPHILS # BLD: 1 % (ref 0–2)
BASOPHILS ABSOLUTE: 0 K/UL (ref 0–0.2)
BASOPHILS ABSOLUTE: 0.03 K/UL (ref 0–0.2)
BASOPHILS ABSOLUTE: 0.03 K/UL (ref 0–0.2)
BASOPHILS ABSOLUTE: 0.04 K/UL (ref 0–0.2)
BASOPHILS ABSOLUTE: 0.04 K/UL (ref 0–0.2)
BASOPHILS ABSOLUTE: 0.05 K/UL (ref 0–0.2)
BASOPHILS ABSOLUTE: 0.06 K/UL (ref 0–0.2)
BASOPHILS ABSOLUTE: <0.03 K/UL (ref 0–0.2)
BILIRUB SERPL-MCNC: 2.19 MG/DL (ref 0.3–1.2)
BILIRUB SERPL-MCNC: 2.56 MG/DL (ref 0.3–1.2)
BILIRUB SERPL-MCNC: 2.77 MG/DL (ref 0.3–1.2)
BILIRUB SERPL-MCNC: 2.88 MG/DL (ref 0.3–1.2)
BILIRUB SERPL-MCNC: 3.03 MG/DL (ref 0.3–1.2)
BILIRUB SERPL-MCNC: 3.16 MG/DL (ref 0.3–1.2)
BILIRUB SERPL-MCNC: 3.29 MG/DL (ref 0.3–1.2)
BILIRUBIN DIRECT: 1.35 MG/DL
BILIRUBIN URINE: NEGATIVE
BILIRUBIN, INDIRECT: 1.42 MG/DL (ref 0–1)
BNP INTERPRETATION: ABNORMAL
BUN BLDV-MCNC: 16 MG/DL (ref 8–23)
BUN BLDV-MCNC: 17 MG/DL (ref 8–23)
BUN BLDV-MCNC: 25 MG/DL (ref 8–23)
BUN BLDV-MCNC: 25 MG/DL (ref 8–23)
BUN BLDV-MCNC: 26 MG/DL (ref 8–23)
BUN BLDV-MCNC: 26 MG/DL (ref 8–23)
BUN BLDV-MCNC: 28 MG/DL (ref 8–23)
BUN BLDV-MCNC: 30 MG/DL (ref 8–23)
BUN BLDV-MCNC: 31 MG/DL (ref 8–23)
BUN BLDV-MCNC: 32 MG/DL (ref 8–23)
BUN BLDV-MCNC: 32 MG/DL (ref 8–23)
BUN BLDV-MCNC: 33 MG/DL (ref 8–23)
BUN BLDV-MCNC: 33 MG/DL (ref 8–23)
BUN BLDV-MCNC: 34 MG/DL (ref 8–23)
BUN/CREAT BLD: 21 (ref 9–20)
BUN/CREAT BLD: 22 (ref 9–20)
BUN/CREAT BLD: 25 (ref 9–20)
BUN/CREAT BLD: 25 (ref 9–20)
BUN/CREAT BLD: 26 (ref 9–20)
BUN/CREAT BLD: 27 (ref 9–20)
BUN/CREAT BLD: 27 (ref 9–20)
BUN/CREAT BLD: 28 (ref 9–20)
BUN/CREAT BLD: 29 (ref 9–20)
BUN/CREAT BLD: 30 (ref 9–20)
BUN/CREAT BLD: 31 (ref 9–20)
BUN/CREAT BLD: 33 (ref 9–20)
BUN/CREAT BLD: 34 (ref 9–20)
BUN/CREAT BLD: 36 (ref 9–20)
CALCIUM SERPL-MCNC: 9 MG/DL (ref 8.6–10.4)
CALCIUM SERPL-MCNC: 9.1 MG/DL (ref 8.6–10.4)
CALCIUM SERPL-MCNC: 9.1 MG/DL (ref 8.6–10.4)
CALCIUM SERPL-MCNC: 9.2 MG/DL (ref 8.6–10.4)
CALCIUM SERPL-MCNC: 9.4 MG/DL (ref 8.6–10.4)
CALCIUM SERPL-MCNC: 9.5 MG/DL (ref 8.6–10.4)
CALCIUM SERPL-MCNC: 9.6 MG/DL (ref 8.6–10.4)
CALCIUM SERPL-MCNC: 9.7 MG/DL (ref 8.6–10.4)
CALCIUM SERPL-MCNC: 9.7 MG/DL (ref 8.6–10.4)
CALCIUM SERPL-MCNC: 9.9 MG/DL (ref 8.6–10.4)
CASE NUMBER:: NORMAL
CASTS UA: ABNORMAL /LPF (ref 0–2)
CHLORIDE BLD-SCNC: 100 MMOL/L (ref 98–107)
CHLORIDE BLD-SCNC: 101 MMOL/L (ref 98–107)
CHLORIDE BLD-SCNC: 102 MMOL/L (ref 98–107)
CHLORIDE BLD-SCNC: 102 MMOL/L (ref 98–107)
CHLORIDE BLD-SCNC: 104 MMOL/L (ref 98–107)
CHLORIDE BLD-SCNC: 97 MMOL/L (ref 98–107)
CHLORIDE BLD-SCNC: 97 MMOL/L (ref 98–107)
CHLORIDE BLD-SCNC: 98 MMOL/L (ref 98–107)
CHLORIDE BLD-SCNC: 99 MMOL/L (ref 98–107)
CHOLESTEROL FLUID: 49 MG/DL
CHOLESTEROL/HDL RATIO: 2.2
CHOLESTEROL: 112 MG/DL
CO2: 23 MMOL/L (ref 20–31)
CO2: 25 MMOL/L (ref 20–31)
CO2: 26 MMOL/L (ref 20–31)
CO2: 27 MMOL/L (ref 20–31)
CO2: 27 MMOL/L (ref 20–31)
CO2: 30 MMOL/L (ref 20–31)
CO2: 30 MMOL/L (ref 20–31)
CO2: 31 MMOL/L (ref 20–31)
CO2: 32 MMOL/L (ref 20–31)
CO2: 33 MMOL/L (ref 20–31)
CO2: 34 MMOL/L (ref 20–31)
COLOR: NORMAL
COMMENT UA: NORMAL
CREAT SERPL-MCNC: 0.6 MG/DL (ref 0.5–0.9)
CREAT SERPL-MCNC: 0.63 MG/DL (ref 0.5–0.9)
CREAT SERPL-MCNC: 0.76 MG/DL (ref 0.5–0.9)
CREAT SERPL-MCNC: 0.77 MG/DL (ref 0.5–0.9)
CREAT SERPL-MCNC: 0.85 MG/DL (ref 0.5–0.9)
CREAT SERPL-MCNC: 0.87 MG/DL (ref 0.5–0.9)
CREAT SERPL-MCNC: 0.9 MG/DL (ref 0.5–0.9)
CREAT SERPL-MCNC: 0.91 MG/DL (ref 0.5–0.9)
CREAT SERPL-MCNC: 0.94 MG/DL (ref 0.5–0.9)
CREAT SERPL-MCNC: 0.94 MG/DL (ref 0.5–0.9)
CREAT SERPL-MCNC: 0.96 MG/DL (ref 0.5–0.9)
CREAT SERPL-MCNC: 1.01 MG/DL (ref 0.5–0.9)
CREAT SERPL-MCNC: 1.17 MG/DL (ref 0.5–0.9)
CREAT SERPL-MCNC: 1.18 MG/DL (ref 0.5–0.9)
CREAT SERPL-MCNC: 1.28 MG/DL (ref 0.5–0.9)
CREAT SERPL-MCNC: 1.3 MG/DL (ref 0.5–0.9)
CRYSTALS, UA: ABNORMAL /HPF
CULTURE: ABNORMAL
CULTURE: NORMAL
DIFFERENTIAL TYPE: ABNORMAL
DIRECT EXAM: ABNORMAL
DIRECT EXAM: NORMAL
EKG ATRIAL RATE: 197 BPM
EKG ATRIAL RATE: 50 BPM
EKG ATRIAL RATE: 69 BPM
EKG ATRIAL RATE: 75 BPM
EKG ATRIAL RATE: 77 BPM
EKG ATRIAL RATE: 79 BPM
EKG ATRIAL RATE: 91 BPM
EKG P AXIS: 77 DEGREES
EKG P-R INTERVAL: 224 MS
EKG Q-T INTERVAL: 378 MS
EKG Q-T INTERVAL: 384 MS
EKG Q-T INTERVAL: 394 MS
EKG Q-T INTERVAL: 404 MS
EKG Q-T INTERVAL: 406 MS
EKG Q-T INTERVAL: 412 MS
EKG Q-T INTERVAL: 420 MS
EKG QRS DURATION: 84 MS
EKG QRS DURATION: 84 MS
EKG QRS DURATION: 86 MS
EKG QRS DURATION: 88 MS
EKG QRS DURATION: 92 MS
EKG QRS DURATION: 94 MS
EKG QRS DURATION: 94 MS
EKG QTC CALCULATION (BAZETT): 437 MS
EKG QTC CALCULATION (BAZETT): 439 MS
EKG QTC CALCULATION (BAZETT): 449 MS
EKG QTC CALCULATION (BAZETT): 463 MS
EKG QTC CALCULATION (BAZETT): 468 MS
EKG QTC CALCULATION (BAZETT): 475 MS
EKG QTC CALCULATION (BAZETT): 488 MS
EKG R AXIS: 36 DEGREES
EKG R AXIS: 52 DEGREES
EKG R AXIS: 54 DEGREES
EKG R AXIS: 70 DEGREES
EKG R AXIS: 80 DEGREES
EKG R AXIS: 81 DEGREES
EKG R AXIS: 9 DEGREES
EKG T AXIS: 55 DEGREES
EKG T AXIS: 57 DEGREES
EKG T AXIS: 57 DEGREES
EKG T AXIS: 66 DEGREES
EKG T AXIS: 79 DEGREES
EKG T AXIS: 83 DEGREES
EKG T AXIS: 94 DEGREES
EKG VENTRICULAR RATE: 76 BPM
EKG VENTRICULAR RATE: 77 BPM
EKG VENTRICULAR RATE: 78 BPM
EKG VENTRICULAR RATE: 78 BPM
EKG VENTRICULAR RATE: 80 BPM
EKG VENTRICULAR RATE: 81 BPM
EKG VENTRICULAR RATE: 88 BPM
EOSINOPHILS RELATIVE PERCENT: 0 % (ref 1–4)
EOSINOPHILS RELATIVE PERCENT: 0 % (ref 1–7)
EOSINOPHILS RELATIVE PERCENT: 1 % (ref 1–4)
EOSINOPHILS RELATIVE PERCENT: 3 % (ref 1–4)
EPITHELIAL CELLS UA: ABNORMAL /HPF (ref 0–5)
ESTIMATED AVERAGE GLUCOSE: 131 MG/DL
GFR AFRICAN AMERICAN: 47 ML/MIN
GFR AFRICAN AMERICAN: 48 ML/MIN
GFR AFRICAN AMERICAN: 53 ML/MIN
GFR AFRICAN AMERICAN: 53 ML/MIN
GFR AFRICAN AMERICAN: >60 ML/MIN
GFR NON-AFRICAN AMERICAN: 39 ML/MIN
GFR NON-AFRICAN AMERICAN: 40 ML/MIN
GFR NON-AFRICAN AMERICAN: 43 ML/MIN
GFR NON-AFRICAN AMERICAN: 44 ML/MIN
GFR NON-AFRICAN AMERICAN: 52 ML/MIN
GFR NON-AFRICAN AMERICAN: 55 ML/MIN
GFR NON-AFRICAN AMERICAN: 56 ML/MIN
GFR NON-AFRICAN AMERICAN: 56 ML/MIN
GFR NON-AFRICAN AMERICAN: 59 ML/MIN
GFR NON-AFRICAN AMERICAN: 59 ML/MIN
GFR NON-AFRICAN AMERICAN: >60 ML/MIN
GFR SERPL CREATININE-BSD FRML MDRD: ABNORMAL ML/MIN/{1.73_M2}
GLOBULIN: 3.3 G/DL (ref 1.5–3.8)
GLUCOSE BLD-MCNC: 102 MG/DL (ref 70–99)
GLUCOSE BLD-MCNC: 103 MG/DL (ref 70–99)
GLUCOSE BLD-MCNC: 106 MG/DL (ref 70–99)
GLUCOSE BLD-MCNC: 109 MG/DL (ref 70–99)
GLUCOSE BLD-MCNC: 112 MG/DL (ref 70–99)
GLUCOSE BLD-MCNC: 114 MG/DL (ref 70–99)
GLUCOSE BLD-MCNC: 115 MG/DL (ref 70–99)
GLUCOSE BLD-MCNC: 118 MG/DL (ref 70–99)
GLUCOSE BLD-MCNC: 118 MG/DL (ref 70–99)
GLUCOSE BLD-MCNC: 125 MG/DL (ref 70–99)
GLUCOSE BLD-MCNC: 131 MG/DL (ref 70–99)
GLUCOSE BLD-MCNC: 80 MG/DL (ref 70–99)
GLUCOSE BLD-MCNC: 90 MG/DL (ref 70–99)
GLUCOSE BLD-MCNC: 95 MG/DL (ref 70–99)
GLUCOSE BLD-MCNC: 97 MG/DL (ref 70–99)
GLUCOSE BLD-MCNC: 99 MG/DL (ref 70–99)
GLUCOSE URINE: NEGATIVE
GLUCOSE, FLUID: 117 MG/DL
HBA1C MFR BLD: 6.2 % (ref 4–6)
HCT VFR BLD CALC: 40.4 % (ref 36.3–47.1)
HCT VFR BLD CALC: 41.4 % (ref 36.3–47.1)
HCT VFR BLD CALC: 41.9 % (ref 36.3–47.1)
HCT VFR BLD CALC: 42.8 % (ref 36.3–47.1)
HCT VFR BLD CALC: 43.8 % (ref 36.3–47.1)
HCT VFR BLD CALC: 44.4 % (ref 36.3–47.1)
HCT VFR BLD CALC: 44.8 % (ref 36.3–47.1)
HCT VFR BLD CALC: 46.9 % (ref 36.3–47.1)
HCT VFR BLD CALC: 47.2 % (ref 36.3–47.1)
HCT VFR BLD CALC: 47.6 % (ref 36.3–47.1)
HCT VFR BLD CALC: 50.8 % (ref 36.3–47.1)
HDLC SERPL-MCNC: 51 MG/DL
HEMOGLOBIN: 13 G/DL (ref 11.9–15.1)
HEMOGLOBIN: 13.1 G/DL (ref 11.9–15.1)
HEMOGLOBIN: 13.4 G/DL (ref 11.9–15.1)
HEMOGLOBIN: 13.4 G/DL (ref 11.9–15.1)
HEMOGLOBIN: 13.9 G/DL (ref 11.9–15.1)
HEMOGLOBIN: 14.7 G/DL (ref 11.9–15.1)
HEMOGLOBIN: 14.9 G/DL (ref 11.9–15.1)
HEMOGLOBIN: 15.3 G/DL (ref 11.9–15.1)
HEMOGLOBIN: 16.3 G/DL (ref 11.9–15.1)
IMMATURE GRANULOCYTES: 0 %
INR BLD: 1.2
KETONES, URINE: ABNORMAL
KETONES, URINE: NEGATIVE
KETONES, URINE: NEGATIVE
LACTATE DEHYDROGENASE, FLUID: 109 U/L
LACTATE DEHYDROGENASE: 307 U/L (ref 135–214)
LACTIC ACID, SEPSIS: 2.1 MMOL/L (ref 0.5–1.9)
LACTIC ACID, SEPSIS: 2.3 MMOL/L (ref 0.5–1.9)
LACTIC ACID, SEPSIS: 2.5 MMOL/L (ref 0.5–1.9)
LACTIC ACID: 1.8 MMOL/L (ref 0.5–2.2)
LACTIC ACID: 2 MMOL/L (ref 0.5–2.2)
LDL CHOLESTEROL: 49 MG/DL (ref 0–130)
LEUKOCYTE ESTERASE, URINE: ABNORMAL
LEUKOCYTE ESTERASE, URINE: NEGATIVE
LEUKOCYTE ESTERASE, URINE: NEGATIVE
LIPASE: 28 U/L (ref 13–60)
LV EF: 50 %
LV EF: 50 %
LVEF MODALITY: NORMAL
LVEF MODALITY: NORMAL
LYMPHOCYTES # BLD: 11 % (ref 24–43)
LYMPHOCYTES # BLD: 12 % (ref 24–43)
LYMPHOCYTES # BLD: 14 % (ref 24–43)
LYMPHOCYTES # BLD: 15 % (ref 24–43)
LYMPHOCYTES # BLD: 15 % (ref 24–43)
LYMPHOCYTES # BLD: 16 % (ref 24–43)
LYMPHOCYTES # BLD: 16 % (ref 24–43)
LYMPHOCYTES # BLD: 19 % (ref 16–46)
LYMPHOCYTES # BLD: 21 % (ref 24–43)
LYMPHOCYTES # BLD: 8 % (ref 24–43)
LYMPHOCYTES # BLD: 8 % (ref 24–43)
LYMPHOCYTES, BODY FLUID: 31 %
Lab: NORMAL
MAGNESIUM: 1.9 MG/DL (ref 1.6–2.6)
MCH RBC QN AUTO: 27.6 PG (ref 25.2–33.5)
MCH RBC QN AUTO: 28 PG (ref 25.2–33.5)
MCH RBC QN AUTO: 28.7 PG (ref 25.2–33.5)
MCH RBC QN AUTO: 28.8 PG (ref 25.2–33.5)
MCH RBC QN AUTO: 30.1 PG (ref 25.2–33.5)
MCH RBC QN AUTO: 30.5 PG (ref 25.2–33.5)
MCH RBC QN AUTO: 30.8 PG (ref 25.2–33.5)
MCH RBC QN AUTO: 30.8 PG (ref 25.2–33.5)
MCH RBC QN AUTO: 30.9 PG (ref 25.2–33.5)
MCH RBC QN AUTO: 30.9 PG (ref 25.2–33.5)
MCH RBC QN AUTO: 31 PG (ref 25.2–33.5)
MCHC RBC AUTO-ENTMCNC: 31 G/DL (ref 25.2–33.5)
MCHC RBC AUTO-ENTMCNC: 31.3 G/DL (ref 25.2–33.5)
MCHC RBC AUTO-ENTMCNC: 31.6 G/DL (ref 25.2–33.5)
MCHC RBC AUTO-ENTMCNC: 31.7 G/DL (ref 25.2–33.5)
MCHC RBC AUTO-ENTMCNC: 32.1 G/DL (ref 25.2–33.5)
MCHC RBC AUTO-ENTMCNC: 32.1 G/DL (ref 25.2–33.5)
MCHC RBC AUTO-ENTMCNC: 32.2 G/DL (ref 25.2–33.5)
MCHC RBC AUTO-ENTMCNC: 32.4 G/DL (ref 25.2–33.5)
MCV RBC AUTO: 88.4 FL (ref 82.6–102.9)
MCV RBC AUTO: 89 FL (ref 82.6–102.9)
MCV RBC AUTO: 89.2 FL (ref 82.6–102.9)
MCV RBC AUTO: 89.5 FL (ref 82.6–102.9)
MCV RBC AUTO: 93.7 FL (ref 82.6–102.9)
MCV RBC AUTO: 95.1 FL (ref 82.6–102.9)
MCV RBC AUTO: 97.5 FL (ref 82.6–102.9)
MCV RBC AUTO: 97.8 FL (ref 82.6–102.9)
MCV RBC AUTO: 98.3 FL (ref 82.6–102.9)
MCV RBC AUTO: 98.8 FL (ref 82.6–102.9)
MCV RBC AUTO: 99.6 FL (ref 82.6–102.9)
MONOCYTES # BLD: 10 % (ref 3–12)
MONOCYTES # BLD: 10 % (ref 3–12)
MONOCYTES # BLD: 11 % (ref 3–12)
MONOCYTES # BLD: 12 % (ref 3–12)
MONOCYTES # BLD: 13 % (ref 3–12)
MONOCYTES # BLD: 13 % (ref 3–12)
MONOCYTES # BLD: 14 % (ref 3–12)
MONOCYTES # BLD: 2 % (ref 4–11)
MONOCYTES # BLD: 6 % (ref 3–12)
MONOCYTES # BLD: 9 % (ref 3–12)
MONOCYTES # BLD: 9 % (ref 3–12)
MORPHOLOGY: ABNORMAL
MUCUS: ABNORMAL
MYOGLOBIN: 186 NG/ML (ref 25–58)
NEUTROPHIL, FLUID: 27 %
NITRITE, URINE: NEGATIVE
NRBC AUTOMATED: 0 PER 100 WBC
OTHER CELLS FLUID: 42 %
OTHER OBSERVATIONS UA: ABNORMAL
PARTIAL THROMBOPLASTIN TIME: 28.6 SEC (ref 23.9–33.8)
PARTIAL THROMBOPLASTIN TIME: 32.1 SEC (ref 23.9–33.8)
PDW BLD-RTO: 17.5 % (ref 11.8–14.4)
PDW BLD-RTO: 17.7 % (ref 11.8–14.4)
PDW BLD-RTO: 17.8 % (ref 11.8–14.4)
PDW BLD-RTO: 18 % (ref 11.8–14.4)
PDW BLD-RTO: 18 % (ref 11.8–14.4)
PDW BLD-RTO: 18.1 % (ref 11.8–14.4)
PDW BLD-RTO: 18.2 % (ref 11.8–14.4)
PDW BLD-RTO: 18.2 % (ref 11.8–14.4)
PDW BLD-RTO: 18.3 % (ref 11.8–14.4)
PDW BLD-RTO: 18.4 % (ref 11.8–14.4)
PDW BLD-RTO: 22 % (ref 11.8–14.4)
PH FLUID: 7.5
PH UA: 5.5 (ref 5–6)
PH UA: 5.5 (ref 5–6)
PH UA: 6 (ref 5–6)
PLATELET # BLD: 147 K/UL (ref 138–453)
PLATELET # BLD: 161 K/UL (ref 138–453)
PLATELET # BLD: 165 K/UL (ref 138–453)
PLATELET # BLD: 166 K/UL (ref 138–453)
PLATELET # BLD: 172 K/UL (ref 138–453)
PLATELET # BLD: 178 K/UL (ref 138–453)
PLATELET # BLD: 179 K/UL (ref 138–453)
PLATELET # BLD: 179 K/UL (ref 138–453)
PLATELET # BLD: 190 K/UL (ref 138–453)
PLATELET # BLD: 191 K/UL (ref 138–453)
PLATELET # BLD: 204 K/UL (ref 138–453)
PLATELET # BLD: 256 K/UL (ref 138–453)
PLATELET ESTIMATE: ABNORMAL
PMV BLD AUTO: 11 FL (ref 8.1–13.5)
PMV BLD AUTO: 11.1 FL (ref 8.1–13.5)
PMV BLD AUTO: 11.3 FL (ref 8.1–13.5)
PMV BLD AUTO: 11.4 FL (ref 8.1–13.5)
PMV BLD AUTO: 11.5 FL (ref 8.1–13.5)
PMV BLD AUTO: 11.6 FL (ref 8.1–13.5)
PMV BLD AUTO: 11.8 FL (ref 8.1–13.5)
PMV BLD AUTO: 11.8 FL (ref 8.1–13.5)
PMV BLD AUTO: 12 FL (ref 8.1–13.5)
PMV BLD AUTO: 12.5 FL (ref 8.1–13.5)
PMV BLD AUTO: 12.6 FL (ref 8.1–13.5)
POTASSIUM SERPL-SCNC: 3.4 MMOL/L (ref 3.7–5.3)
POTASSIUM SERPL-SCNC: 3.8 MMOL/L (ref 3.7–5.3)
POTASSIUM SERPL-SCNC: 4 MMOL/L (ref 3.7–5.3)
POTASSIUM SERPL-SCNC: 4.1 MMOL/L (ref 3.7–5.3)
POTASSIUM SERPL-SCNC: 4.3 MMOL/L (ref 3.7–5.3)
POTASSIUM SERPL-SCNC: 4.4 MMOL/L (ref 3.7–5.3)
POTASSIUM SERPL-SCNC: 4.5 MMOL/L (ref 3.7–5.3)
POTASSIUM SERPL-SCNC: 4.6 MMOL/L (ref 3.7–5.3)
POTASSIUM SERPL-SCNC: 4.8 MMOL/L (ref 3.7–5.3)
POTASSIUM SERPL-SCNC: 4.8 MMOL/L (ref 3.7–5.3)
PRO-BNP: 2825 PG/ML
PRO-BNP: 4113 PG/ML
PRO-BNP: 4743 PG/ML
PRO-BNP: 4798 PG/ML
PRO-BNP: 5149 PG/ML
PROTEIN UA: ABNORMAL
PROTEIN UA: NEGATIVE
PROTEIN UA: NEGATIVE
PROTHROMBIN TIME: 14.3 SEC (ref 11.5–14.2)
PROTHROMBIN TIME: 14.8 SEC (ref 11.5–14.2)
PROTHROMBIN TIME: 15 SEC (ref 11.5–14.2)
RBC # BLD: 4.33 M/UL (ref 3.95–5.11)
RBC # BLD: 4.48 M/UL (ref 3.95–5.11)
RBC # BLD: 4.5 M/UL (ref 3.95–5.11)
RBC # BLD: 4.53 M/UL (ref 3.95–5.11)
RBC # BLD: 4.65 M/UL (ref 3.95–5.11)
RBC # BLD: 4.74 M/UL (ref 3.95–5.11)
RBC # BLD: 4.77 M/UL (ref 3.95–5.11)
RBC # BLD: 4.84 M/UL (ref 3.95–5.11)
RBC # BLD: 4.96 M/UL (ref 3.95–5.11)
RBC # BLD: 5.08 M/UL (ref 3.95–5.11)
RBC # BLD: 5.34 M/UL (ref 3.95–5.11)
RBC # BLD: ABNORMAL 10*6/UL
RBC FLUID: ABNORMAL /MM3
RBC UA: ABNORMAL /HPF (ref 0–4)
RENAL EPITHELIAL, UA: ABNORMAL /HPF
SARS-COV-2, RAPID: NOT DETECTED
SEG NEUTROPHILS: 63 % (ref 36–65)
SEG NEUTROPHILS: 70 % (ref 36–65)
SEG NEUTROPHILS: 70 % (ref 36–65)
SEG NEUTROPHILS: 73 % (ref 36–65)
SEG NEUTROPHILS: 74 % (ref 36–65)
SEG NEUTROPHILS: 79 % (ref 36–65)
SEG NEUTROPHILS: 79 % (ref 43–77)
SEG NEUTROPHILS: 82 % (ref 36–65)
SEG NEUTROPHILS: 85 % (ref 36–65)
SEGMENTED NEUTROPHILS ABSOLUTE COUNT: 1.74 K/UL (ref 1.5–8.1)
SEGMENTED NEUTROPHILS ABSOLUTE COUNT: 3.13 K/UL (ref 1.5–8.1)
SEGMENTED NEUTROPHILS ABSOLUTE COUNT: 3.54 K/UL (ref 1.5–8.1)
SEGMENTED NEUTROPHILS ABSOLUTE COUNT: 3.73 K/UL (ref 1.5–8.1)
SEGMENTED NEUTROPHILS ABSOLUTE COUNT: 3.88 K/UL (ref 1.5–8.1)
SEGMENTED NEUTROPHILS ABSOLUTE COUNT: 4.68 K/UL (ref 1.5–8.1)
SEGMENTED NEUTROPHILS ABSOLUTE COUNT: 4.77 K/UL (ref 1.5–8.1)
SEGMENTED NEUTROPHILS ABSOLUTE COUNT: 5.25 K/UL (ref 1.5–8.1)
SEGMENTED NEUTROPHILS ABSOLUTE COUNT: 5.38 K/UL (ref 1.5–8.1)
SEGMENTED NEUTROPHILS ABSOLUTE COUNT: 6.37 K/UL (ref 1.5–8.1)
SEGMENTED NEUTROPHILS ABSOLUTE COUNT: 7.91 K/UL (ref 1.5–8.1)
SODIUM BLD-SCNC: 138 MMOL/L (ref 135–144)
SODIUM BLD-SCNC: 139 MMOL/L (ref 135–144)
SODIUM BLD-SCNC: 140 MMOL/L (ref 135–144)
SODIUM BLD-SCNC: 141 MMOL/L (ref 135–144)
SODIUM BLD-SCNC: 142 MMOL/L (ref 135–144)
SODIUM BLD-SCNC: 143 MMOL/L (ref 135–144)
SODIUM BLD-SCNC: 143 MMOL/L (ref 135–144)
SODIUM BLD-SCNC: 144 MMOL/L (ref 135–144)
SOURCE: NORMAL
SPECIFIC GRAVITY UA: 1.01 (ref 1.01–1.02)
SPECIFIC GRAVITY UA: 1.01 (ref 1.01–1.02)
SPECIFIC GRAVITY UA: 1.02 (ref 1.01–1.02)
SPECIMEN DESCRIPTION: ABNORMAL
SPECIMEN DESCRIPTION: NORMAL
SPECIMEN TYPE: ABNORMAL
SPECIMEN TYPE: NORMAL
SURGICAL PATHOLOGY REPORT: NORMAL
THYROXINE, FREE: 1.13 NG/DL (ref 0.93–1.7)
TOTAL PROTEIN, BODY FLUID: 2.8 G/DL
TOTAL PROTEIN: 6.1 G/DL (ref 6.4–8.3)
TOTAL PROTEIN: 6.2 G/DL (ref 6.4–8.3)
TOTAL PROTEIN: 6.2 G/DL (ref 6.4–8.3)
TOTAL PROTEIN: 6.6 G/DL (ref 6.4–8.3)
TOTAL PROTEIN: 6.7 G/DL (ref 6.4–8.3)
TOTAL PROTEIN: 6.9 G/DL (ref 6.4–8.3)
TOTAL PROTEIN: 7.1 G/DL (ref 6.4–8.3)
TOTAL PROTEIN: 7.2 G/DL (ref 6.4–8.3)
TRICHOMONAS: ABNORMAL
TRIGL SERPL-MCNC: 62 MG/DL
TROPONIN INTERP: ABNORMAL
TROPONIN T: ABNORMAL NG/ML
TROPONIN, HIGH SENSITIVITY: 22 NG/L (ref 0–14)
TROPONIN, HIGH SENSITIVITY: 24 NG/L (ref 0–14)
TROPONIN, HIGH SENSITIVITY: 25 NG/L (ref 0–14)
TROPONIN, HIGH SENSITIVITY: 25 NG/L (ref 0–14)
TROPONIN, HIGH SENSITIVITY: 27 NG/L (ref 0–14)
TROPONIN, HIGH SENSITIVITY: 29 NG/L (ref 0–14)
TROPONIN, HIGH SENSITIVITY: 34 NG/L (ref 0–14)
TROPONIN, HIGH SENSITIVITY: 35 NG/L (ref 0–14)
TROPONIN, HIGH SENSITIVITY: 37 NG/L (ref 0–14)
TROPONIN, HIGH SENSITIVITY: 39 NG/L (ref 0–14)
TROPONIN, HIGH SENSITIVITY: 40 NG/L (ref 0–14)
TSH SERPL DL<=0.05 MIU/L-ACNC: 12.88 MIU/L (ref 0.3–5)
TSH SERPL DL<=0.05 MIU/L-ACNC: 39.53 UIU/ML (ref 0.3–5)
TSH SERPL DL<=0.05 MIU/L-ACNC: 63.87 UIU/ML (ref 0.3–5)
TURBIDITY: NORMAL
URINE HGB: ABNORMAL
URINE HGB: NEGATIVE
URINE HGB: NEGATIVE
UROBILINOGEN, URINE: NORMAL
VLDLC SERPL CALC-MCNC: NORMAL MG/DL (ref 1–30)
WBC # BLD: 2.2 K/UL (ref 3.5–11.3)
WBC # BLD: 4.8 K/UL (ref 3.5–11.3)
WBC # BLD: 5 K/UL (ref 3.5–11.3)
WBC # BLD: 5.3 K/UL (ref 3.5–11.3)
WBC # BLD: 5.5 K/UL (ref 3.5–11.3)
WBC # BLD: 6.4 K/UL (ref 3.5–11.3)
WBC # BLD: 6.5 K/UL (ref 3.5–11.3)
WBC # BLD: 6.7 K/UL (ref 3.5–11.3)
WBC # BLD: 7.2 K/UL (ref 3.5–11.3)
WBC # BLD: 7.8 K/UL (ref 3.5–11.3)
WBC # BLD: 9.3 K/UL (ref 3.5–11.3)
WBC # BLD: ABNORMAL 10*3/UL
WBC FLUID: 71 /MM3
WBC UA: ABNORMAL /HPF (ref 0–4)
YEAST: ABNORMAL

## 2022-01-01 PROCEDURE — G8427 DOCREV CUR MEDS BY ELIG CLIN: HCPCS | Performed by: FAMILY MEDICINE

## 2022-01-01 PROCEDURE — G8427 DOCREV CUR MEDS BY ELIG CLIN: HCPCS | Performed by: SURGERY

## 2022-01-01 PROCEDURE — 87635 SARS-COV-2 COVID-19 AMP PRB: CPT

## 2022-01-01 PROCEDURE — 87070 CULTURE OTHR SPECIMN AEROBIC: CPT

## 2022-01-01 PROCEDURE — 6370000000 HC RX 637 (ALT 250 FOR IP): Performed by: STUDENT IN AN ORGANIZED HEALTH CARE EDUCATION/TRAINING PROGRAM

## 2022-01-01 PROCEDURE — 99204 OFFICE O/P NEW MOD 45 MIN: CPT | Performed by: INTERNAL MEDICINE

## 2022-01-01 PROCEDURE — 6360000002 HC RX W HCPCS: Performed by: INTERNAL MEDICINE

## 2022-01-01 PROCEDURE — 6370000000 HC RX 637 (ALT 250 FOR IP)

## 2022-01-01 PROCEDURE — 1036F TOBACCO NON-USER: CPT | Performed by: NURSE PRACTITIONER

## 2022-01-01 PROCEDURE — 1123F ACP DISCUSS/DSCN MKR DOCD: CPT | Performed by: NURSE PRACTITIONER

## 2022-01-01 PROCEDURE — 99212 OFFICE O/P EST SF 10 MIN: CPT

## 2022-01-01 PROCEDURE — 36415 COLL VENOUS BLD VENIPUNCTURE: CPT

## 2022-01-01 PROCEDURE — 82140 ASSAY OF AMMONIA: CPT

## 2022-01-01 PROCEDURE — 99285 EMERGENCY DEPT VISIT HI MDM: CPT

## 2022-01-01 PROCEDURE — 71046 X-RAY EXAM CHEST 2 VIEWS: CPT

## 2022-01-01 PROCEDURE — 84484 ASSAY OF TROPONIN QUANT: CPT

## 2022-01-01 PROCEDURE — 1090F PRES/ABSN URINE INCON ASSESS: CPT | Performed by: NURSE PRACTITIONER

## 2022-01-01 PROCEDURE — 6360000002 HC RX W HCPCS

## 2022-01-01 PROCEDURE — G8417 CALC BMI ABV UP PARAM F/U: HCPCS | Performed by: FAMILY MEDICINE

## 2022-01-01 PROCEDURE — 83735 ASSAY OF MAGNESIUM: CPT

## 2022-01-01 PROCEDURE — 96366 THER/PROPH/DIAG IV INF ADDON: CPT

## 2022-01-01 PROCEDURE — G0180 MD CERTIFICATION HHA PATIENT: HCPCS | Performed by: NURSE PRACTITIONER

## 2022-01-01 PROCEDURE — 84439 ASSAY OF FREE THYROXINE: CPT

## 2022-01-01 PROCEDURE — 0W993ZZ DRAINAGE OF RIGHT PLEURAL CAVITY, PERCUTANEOUS APPROACH: ICD-10-PCS | Performed by: RADIOLOGY

## 2022-01-01 PROCEDURE — 7100000010 HC PHASE II RECOVERY - FIRST 15 MIN

## 2022-01-01 PROCEDURE — 88112 CYTOPATH CELL ENHANCE TECH: CPT

## 2022-01-01 PROCEDURE — 87040 BLOOD CULTURE FOR BACTERIA: CPT

## 2022-01-01 PROCEDURE — 2500000003 HC RX 250 WO HCPCS

## 2022-01-01 PROCEDURE — 82945 GLUCOSE OTHER FLUID: CPT

## 2022-01-01 PROCEDURE — 99214 OFFICE O/P EST MOD 30 MIN: CPT | Performed by: NURSE PRACTITIONER

## 2022-01-01 PROCEDURE — 99204 OFFICE O/P NEW MOD 45 MIN: CPT | Performed by: NURSE PRACTITIONER

## 2022-01-01 PROCEDURE — 99239 HOSP IP/OBS DSCHRG MGMT >30: CPT | Performed by: FAMILY MEDICINE

## 2022-01-01 PROCEDURE — 2580000003 HC RX 258

## 2022-01-01 PROCEDURE — 99213 OFFICE O/P EST LOW 20 MIN: CPT | Performed by: INTERNAL MEDICINE

## 2022-01-01 PROCEDURE — 83986 ASSAY PH BODY FLUID NOS: CPT

## 2022-01-01 PROCEDURE — G8427 DOCREV CUR MEDS BY ELIG CLIN: HCPCS | Performed by: NURSE PRACTITIONER

## 2022-01-01 PROCEDURE — 2709999900 CT ABDOMEN PELVIS W IV CONTRAST

## 2022-01-01 PROCEDURE — 85610 PROTHROMBIN TIME: CPT

## 2022-01-01 PROCEDURE — G8417 CALC BMI ABV UP PARAM F/U: HCPCS | Performed by: NURSE PRACTITIONER

## 2022-01-01 PROCEDURE — 83605 ASSAY OF LACTIC ACID: CPT

## 2022-01-01 PROCEDURE — 1036F TOBACCO NON-USER: CPT | Performed by: INTERNAL MEDICINE

## 2022-01-01 PROCEDURE — 1036F TOBACCO NON-USER: CPT | Performed by: SURGERY

## 2022-01-01 PROCEDURE — 2700000000 HC OXYGEN THERAPY PER DAY

## 2022-01-01 PROCEDURE — 80048 BASIC METABOLIC PNL TOTAL CA: CPT

## 2022-01-01 PROCEDURE — 94640 AIRWAY INHALATION TREATMENT: CPT

## 2022-01-01 PROCEDURE — 88305 TISSUE EXAM BY PATHOLOGIST: CPT

## 2022-01-01 PROCEDURE — 2580000003 HC RX 258: Performed by: INTERNAL MEDICINE

## 2022-01-01 PROCEDURE — 71045 X-RAY EXAM CHEST 1 VIEW: CPT

## 2022-01-01 PROCEDURE — 80053 COMPREHEN METABOLIC PANEL: CPT

## 2022-01-01 PROCEDURE — 84311 SPECTROPHOTOMETRY: CPT

## 2022-01-01 PROCEDURE — 2580000003 HC RX 258: Performed by: EMERGENCY MEDICINE

## 2022-01-01 PROCEDURE — G8484 FLU IMMUNIZE NO ADMIN: HCPCS | Performed by: INTERNAL MEDICINE

## 2022-01-01 PROCEDURE — 99232 SBSQ HOSP IP/OBS MODERATE 35: CPT | Performed by: FAMILY MEDICINE

## 2022-01-01 PROCEDURE — 87077 CULTURE AEROBIC IDENTIFY: CPT

## 2022-01-01 PROCEDURE — 93306 TTE W/DOPPLER COMPLETE: CPT

## 2022-01-01 PROCEDURE — 81001 URINALYSIS AUTO W/SCOPE: CPT

## 2022-01-01 PROCEDURE — 29580 STRAPPING UNNA BOOT: CPT | Performed by: PODIATRIST

## 2022-01-01 PROCEDURE — 93005 ELECTROCARDIOGRAM TRACING: CPT | Performed by: NURSE PRACTITIONER

## 2022-01-01 PROCEDURE — 93005 ELECTROCARDIOGRAM TRACING: CPT | Performed by: EMERGENCY MEDICINE

## 2022-01-01 PROCEDURE — 51701 INSERT BLADDER CATHETER: CPT

## 2022-01-01 PROCEDURE — 93010 ELECTROCARDIOGRAM REPORT: CPT | Performed by: NURSE PRACTITIONER

## 2022-01-01 PROCEDURE — 87015 SPECIMEN INFECT AGNT CONCNTJ: CPT

## 2022-01-01 PROCEDURE — 85025 COMPLETE CBC W/AUTO DIFF WBC: CPT

## 2022-01-01 PROCEDURE — 99222 1ST HOSP IP/OBS MODERATE 55: CPT | Performed by: INTERNAL MEDICINE

## 2022-01-01 PROCEDURE — 99495 TRANSJ CARE MGMT MOD F2F 14D: CPT | Performed by: NURSE PRACTITIONER

## 2022-01-01 PROCEDURE — 87186 SC STD MICRODIL/AGAR DIL: CPT

## 2022-01-01 PROCEDURE — 4040F PNEUMOC VAC/ADMIN/RCVD: CPT | Performed by: INTERNAL MEDICINE

## 2022-01-01 PROCEDURE — 4040F PNEUMOC VAC/ADMIN/RCVD: CPT | Performed by: NURSE PRACTITIONER

## 2022-01-01 PROCEDURE — 6370000000 HC RX 637 (ALT 250 FOR IP): Performed by: INTERNAL MEDICINE

## 2022-01-01 PROCEDURE — 83880 ASSAY OF NATRIURETIC PEPTIDE: CPT

## 2022-01-01 PROCEDURE — 94760 N-INVAS EAR/PLS OXIMETRY 1: CPT

## 2022-01-01 PROCEDURE — 29580 STRAPPING UNNA BOOT: CPT

## 2022-01-01 PROCEDURE — 2060000000 HC ICU INTERMEDIATE R&B

## 2022-01-01 PROCEDURE — 83036 HEMOGLOBIN GLYCOSYLATED A1C: CPT

## 2022-01-01 PROCEDURE — 97110 THERAPEUTIC EXERCISES: CPT

## 2022-01-01 PROCEDURE — 99203 OFFICE O/P NEW LOW 30 MIN: CPT | Performed by: FAMILY MEDICINE

## 2022-01-01 PROCEDURE — 99213 OFFICE O/P EST LOW 20 MIN: CPT | Performed by: FAMILY MEDICINE

## 2022-01-01 PROCEDURE — 83690 ASSAY OF LIPASE: CPT

## 2022-01-01 PROCEDURE — 87205 SMEAR GRAM STAIN: CPT

## 2022-01-01 PROCEDURE — G8484 FLU IMMUNIZE NO ADMIN: HCPCS | Performed by: NURSE PRACTITIONER

## 2022-01-01 PROCEDURE — 84443 ASSAY THYROID STIM HORMONE: CPT

## 2022-01-01 PROCEDURE — 1123F ACP DISCUSS/DSCN MKR DOCD: CPT | Performed by: FAMILY MEDICINE

## 2022-01-01 PROCEDURE — 99222 1ST HOSP IP/OBS MODERATE 55: CPT

## 2022-01-01 PROCEDURE — 1200000000 HC SEMI PRIVATE

## 2022-01-01 PROCEDURE — 97165 OT EVAL LOW COMPLEX 30 MIN: CPT | Performed by: OCCUPATIONAL THERAPIST

## 2022-01-01 PROCEDURE — 1090F PRES/ABSN URINE INCON ASSESS: CPT | Performed by: FAMILY MEDICINE

## 2022-01-01 PROCEDURE — 87116 MYCOBACTERIA CULTURE: CPT

## 2022-01-01 PROCEDURE — 6360000004 HC RX CONTRAST MEDICATION: Performed by: EMERGENCY MEDICINE

## 2022-01-01 PROCEDURE — G8400 PT W/DXA NO RESULTS DOC: HCPCS | Performed by: INTERNAL MEDICINE

## 2022-01-01 PROCEDURE — 93005 ELECTROCARDIOGRAM TRACING: CPT | Performed by: INTERNAL MEDICINE

## 2022-01-01 PROCEDURE — 1111F DSCHRG MED/CURRENT MED MERGE: CPT | Performed by: INTERNAL MEDICINE

## 2022-01-01 PROCEDURE — 99213 OFFICE O/P EST LOW 20 MIN: CPT | Performed by: PODIATRIST

## 2022-01-01 PROCEDURE — 87206 SMEAR FLUORESCENT/ACID STAI: CPT

## 2022-01-01 PROCEDURE — 93325 DOPPLER ECHO COLOR FLOW MAPG: CPT

## 2022-01-01 PROCEDURE — 1090F PRES/ABSN URINE INCON ASSESS: CPT | Performed by: INTERNAL MEDICINE

## 2022-01-01 PROCEDURE — 99232 SBSQ HOSP IP/OBS MODERATE 35: CPT | Performed by: INTERNAL MEDICINE

## 2022-01-01 PROCEDURE — 97535 SELF CARE MNGMENT TRAINING: CPT

## 2022-01-01 PROCEDURE — 99203 OFFICE O/P NEW LOW 30 MIN: CPT | Performed by: SURGERY

## 2022-01-01 PROCEDURE — 2709999900 HC NON-CHARGEABLE SUPPLY

## 2022-01-01 PROCEDURE — 1036F TOBACCO NON-USER: CPT | Performed by: FAMILY MEDICINE

## 2022-01-01 PROCEDURE — G8427 DOCREV CUR MEDS BY ELIG CLIN: HCPCS | Performed by: INTERNAL MEDICINE

## 2022-01-01 PROCEDURE — 97116 GAIT TRAINING THERAPY: CPT

## 2022-01-01 PROCEDURE — 7100000000 HC PACU RECOVERY - FIRST 15 MIN

## 2022-01-01 PROCEDURE — 4040F PNEUMOC VAC/ADMIN/RCVD: CPT | Performed by: FAMILY MEDICINE

## 2022-01-01 PROCEDURE — 70450 CT HEAD/BRAIN W/O DYE: CPT

## 2022-01-01 PROCEDURE — 89051 BODY FLUID CELL COUNT: CPT

## 2022-01-01 PROCEDURE — 92960 CARDIOVERSION ELECTRIC EXT: CPT

## 2022-01-01 PROCEDURE — 1123F ACP DISCUSS/DSCN MKR DOCD: CPT | Performed by: SURGERY

## 2022-01-01 PROCEDURE — 1090F PRES/ABSN URINE INCON ASSESS: CPT | Performed by: SURGERY

## 2022-01-01 PROCEDURE — 83615 LACTATE (LD) (LDH) ENZYME: CPT

## 2022-01-01 PROCEDURE — 83874 ASSAY OF MYOGLOBIN: CPT

## 2022-01-01 PROCEDURE — G8484 FLU IMMUNIZE NO ADMIN: HCPCS | Performed by: FAMILY MEDICINE

## 2022-01-01 PROCEDURE — 51798 US URINE CAPACITY MEASURE: CPT

## 2022-01-01 PROCEDURE — 1123F ACP DISCUSS/DSCN MKR DOCD: CPT | Performed by: INTERNAL MEDICINE

## 2022-01-01 PROCEDURE — 7100000001 HC PACU RECOVERY - ADDTL 15 MIN

## 2022-01-01 PROCEDURE — 85049 AUTOMATED PLATELET COUNT: CPT

## 2022-01-01 PROCEDURE — 96374 THER/PROPH/DIAG INJ IV PUSH: CPT

## 2022-01-01 PROCEDURE — 80076 HEPATIC FUNCTION PANEL: CPT

## 2022-01-01 PROCEDURE — 99231 SBSQ HOSP IP/OBS SF/LOW 25: CPT | Performed by: INTERNAL MEDICINE

## 2022-01-01 PROCEDURE — 84157 ASSAY OF PROTEIN OTHER: CPT

## 2022-01-01 PROCEDURE — 4040F PNEUMOC VAC/ADMIN/RCVD: CPT | Performed by: SURGERY

## 2022-01-01 PROCEDURE — 97161 PT EVAL LOW COMPLEX 20 MIN: CPT

## 2022-01-01 PROCEDURE — G8400 PT W/DXA NO RESULTS DOC: HCPCS | Performed by: NURSE PRACTITIONER

## 2022-01-01 PROCEDURE — 6360000002 HC RX W HCPCS: Performed by: EMERGENCY MEDICINE

## 2022-01-01 PROCEDURE — 93970 EXTREMITY STUDY: CPT

## 2022-01-01 PROCEDURE — 99203 OFFICE O/P NEW LOW 30 MIN: CPT

## 2022-01-01 PROCEDURE — 93312 ECHO TRANSESOPHAGEAL: CPT

## 2022-01-01 PROCEDURE — 93010 ELECTROCARDIOGRAM REPORT: CPT | Performed by: INTERNAL MEDICINE

## 2022-01-01 PROCEDURE — 99213 OFFICE O/P EST LOW 20 MIN: CPT

## 2022-01-01 PROCEDURE — G8417 CALC BMI ABV UP PARAM F/U: HCPCS | Performed by: INTERNAL MEDICINE

## 2022-01-01 PROCEDURE — 72125 CT NECK SPINE W/O DYE: CPT

## 2022-01-01 PROCEDURE — 96365 THER/PROPH/DIAG IV INF INIT: CPT

## 2022-01-01 PROCEDURE — 85730 THROMBOPLASTIN TIME PARTIAL: CPT

## 2022-01-01 PROCEDURE — 87075 CULTR BACTERIA EXCEPT BLOOD: CPT

## 2022-01-01 PROCEDURE — 93005 ELECTROCARDIOGRAM TRACING: CPT

## 2022-01-01 PROCEDURE — 84155 ASSAY OF PROTEIN SERUM: CPT

## 2022-01-01 PROCEDURE — G8400 PT W/DXA NO RESULTS DOC: HCPCS | Performed by: FAMILY MEDICINE

## 2022-01-01 PROCEDURE — 7100000011 HC PHASE II RECOVERY - ADDTL 15 MIN

## 2022-01-01 PROCEDURE — 94761 N-INVAS EAR/PLS OXIMETRY MLT: CPT

## 2022-01-01 PROCEDURE — 99213 OFFICE O/P EST LOW 20 MIN: CPT | Performed by: NURSE PRACTITIONER

## 2022-01-01 PROCEDURE — 76536 US EXAM OF HEAD AND NECK: CPT

## 2022-01-01 PROCEDURE — 99203 OFFICE O/P NEW LOW 30 MIN: CPT | Performed by: PODIATRIST

## 2022-01-01 PROCEDURE — 2709999900 US THORACENTESIS

## 2022-01-01 PROCEDURE — 80061 LIPID PANEL: CPT

## 2022-01-01 PROCEDURE — 99212 OFFICE O/P EST SF 10 MIN: CPT | Performed by: FAMILY MEDICINE

## 2022-01-01 PROCEDURE — G8417 CALC BMI ABV UP PARAM F/U: HCPCS | Performed by: SURGERY

## 2022-01-01 PROCEDURE — 1111F DSCHRG MED/CURRENT MED MERGE: CPT | Performed by: FAMILY MEDICINE

## 2022-01-01 PROCEDURE — 82150 ASSAY OF AMYLASE: CPT

## 2022-01-01 PROCEDURE — 99214 OFFICE O/P EST MOD 30 MIN: CPT | Performed by: INTERNAL MEDICINE

## 2022-01-01 PROCEDURE — 96375 TX/PRO/DX INJ NEW DRUG ADDON: CPT

## 2022-01-01 PROCEDURE — G0438 PPPS, INITIAL VISIT: HCPCS | Performed by: NURSE PRACTITIONER

## 2022-01-01 RX ORDER — LIDOCAINE HYDROCHLORIDE 20 MG/ML
JELLY TOPICAL ONCE
Status: CANCELLED | OUTPATIENT
Start: 2022-01-01 | End: 2022-01-01

## 2022-01-01 RX ORDER — DOXYCYCLINE HYCLATE 100 MG
100 TABLET ORAL 2 TIMES DAILY
Qty: 20 TABLET | Refills: 0 | Status: SHIPPED | OUTPATIENT
Start: 2022-01-01 | End: 2022-01-01

## 2022-01-01 RX ORDER — 0.9 % SODIUM CHLORIDE 0.9 %
1000 INTRAVENOUS SOLUTION INTRAVENOUS ONCE
Status: DISCONTINUED | OUTPATIENT
Start: 2022-01-01 | End: 2022-01-01

## 2022-01-01 RX ORDER — BACITRACIN, NEOMYCIN, POLYMYXIN B 400; 3.5; 5 [USP'U]/G; MG/G; [USP'U]/G
OINTMENT TOPICAL ONCE
Status: CANCELLED | OUTPATIENT
Start: 2022-01-01 | End: 2022-01-01

## 2022-01-01 RX ORDER — 0.9 % SODIUM CHLORIDE 0.9 %
500 INTRAVENOUS SOLUTION INTRAVENOUS ONCE
Status: COMPLETED | OUTPATIENT
Start: 2022-01-01 | End: 2022-01-01

## 2022-01-01 RX ORDER — POTASSIUM CHLORIDE 20 MEQ/1
10 TABLET, EXTENDED RELEASE ORAL DAILY
Status: DISCONTINUED | OUTPATIENT
Start: 2022-01-01 | End: 2022-01-01 | Stop reason: HOSPADM

## 2022-01-01 RX ORDER — MULTIVITAMIN WITH IRON
1 TABLET ORAL DAILY
Status: DISCONTINUED | OUTPATIENT
Start: 2022-01-01 | End: 2022-01-01 | Stop reason: HOSPADM

## 2022-01-01 RX ORDER — SODIUM CHLORIDE 0.9 % (FLUSH) 0.9 %
5-40 SYRINGE (ML) INJECTION EVERY 12 HOURS SCHEDULED
Status: DISCONTINUED | OUTPATIENT
Start: 2022-01-01 | End: 2022-01-01 | Stop reason: SDUPTHER

## 2022-01-01 RX ORDER — BACITRACIN ZINC AND POLYMYXIN B SULFATE 500; 1000 [USP'U]/G; [USP'U]/G
OINTMENT TOPICAL ONCE
Status: CANCELLED | OUTPATIENT
Start: 2022-01-01 | End: 2022-01-01

## 2022-01-01 RX ORDER — SODIUM CHLORIDE 0.9 % (FLUSH) 0.9 %
5-40 SYRINGE (ML) INJECTION PRN
Status: DISCONTINUED | OUTPATIENT
Start: 2022-01-01 | End: 2022-01-01 | Stop reason: SDUPTHER

## 2022-01-01 RX ORDER — LEVOTHYROXINE SODIUM 0.03 MG/1
25 TABLET ORAL DAILY
Qty: 30 TABLET | Refills: 3 | Status: SHIPPED | OUTPATIENT
Start: 2022-01-01 | End: 2022-01-01 | Stop reason: SDUPTHER

## 2022-01-01 RX ORDER — CEPHALEXIN 500 MG/1
500 CAPSULE ORAL 2 TIMES DAILY
Qty: 14 CAPSULE | Refills: 0 | Status: SHIPPED | OUTPATIENT
Start: 2022-01-01 | End: 2022-01-01

## 2022-01-01 RX ORDER — LIDOCAINE 40 MG/G
CREAM TOPICAL ONCE
Status: CANCELLED | OUTPATIENT
Start: 2022-01-01 | End: 2022-01-01

## 2022-01-01 RX ORDER — GENTAMICIN SULFATE 1 MG/G
OINTMENT TOPICAL ONCE
Status: CANCELLED | OUTPATIENT
Start: 2022-01-01 | End: 2022-01-01

## 2022-01-01 RX ORDER — LIDOCAINE HYDROCHLORIDE 40 MG/ML
SOLUTION TOPICAL ONCE
Status: CANCELLED | OUTPATIENT
Start: 2022-01-01 | End: 2022-01-01

## 2022-01-01 RX ORDER — AMIODARONE HYDROCHLORIDE 200 MG/1
200 TABLET ORAL 2 TIMES DAILY
Qty: 30 TABLET | Refills: 0 | Status: SHIPPED | OUTPATIENT
Start: 2022-01-01 | End: 2022-01-01 | Stop reason: ALTCHOICE

## 2022-01-01 RX ORDER — 0.9 % SODIUM CHLORIDE 0.9 %
250 INTRAVENOUS SOLUTION INTRAVENOUS ONCE
Status: COMPLETED | OUTPATIENT
Start: 2022-01-01 | End: 2022-01-01

## 2022-01-01 RX ORDER — LEVOTHYROXINE SODIUM 0.07 MG/1
37.5 TABLET ORAL DAILY
Status: DISCONTINUED | OUTPATIENT
Start: 2022-01-01 | End: 2022-01-01 | Stop reason: HOSPADM

## 2022-01-01 RX ORDER — LEVOTHYROXINE SODIUM 0.03 MG/1
37.5 TABLET ORAL DAILY
Qty: 45 TABLET | Refills: 0 | Status: SHIPPED | OUTPATIENT
Start: 2022-01-01 | End: 2022-01-01 | Stop reason: SDUPTHER

## 2022-01-01 RX ORDER — CLOBETASOL PROPIONATE 0.5 MG/G
OINTMENT TOPICAL ONCE
Status: CANCELLED | OUTPATIENT
Start: 2022-01-01 | End: 2022-01-01

## 2022-01-01 RX ORDER — CIPROFLOXACIN 2 MG/ML
400 INJECTION, SOLUTION INTRAVENOUS EVERY 12 HOURS
Status: DISCONTINUED | OUTPATIENT
Start: 2022-01-01 | End: 2022-01-01 | Stop reason: HOSPADM

## 2022-01-01 RX ORDER — FUROSEMIDE 10 MG/ML
20 INJECTION INTRAMUSCULAR; INTRAVENOUS 2 TIMES DAILY
Status: DISCONTINUED | OUTPATIENT
Start: 2022-01-01 | End: 2022-01-01 | Stop reason: HOSPADM

## 2022-01-01 RX ORDER — SODIUM CHLORIDE 9 MG/ML
25 INJECTION, SOLUTION INTRAVENOUS PRN
Status: CANCELLED | OUTPATIENT
Start: 2022-01-01

## 2022-01-01 RX ORDER — POTASSIUM CHLORIDE 750 MG/1
10 TABLET, EXTENDED RELEASE ORAL DAILY
Qty: 90 TABLET | Refills: 3 | Status: SHIPPED | OUTPATIENT
Start: 2022-01-01 | End: 2023-04-13

## 2022-01-01 RX ORDER — ACETAMINOPHEN 650 MG/1
650 SUPPOSITORY RECTAL EVERY 6 HOURS PRN
Status: DISCONTINUED | OUTPATIENT
Start: 2022-01-01 | End: 2022-01-01 | Stop reason: HOSPADM

## 2022-01-01 RX ORDER — GINSENG 100 MG
CAPSULE ORAL ONCE
Status: CANCELLED | OUTPATIENT
Start: 2022-01-01 | End: 2022-01-01

## 2022-01-01 RX ORDER — LIDOCAINE 50 MG/G
OINTMENT TOPICAL ONCE
Status: CANCELLED | OUTPATIENT
Start: 2022-01-01 | End: 2022-01-01

## 2022-01-01 RX ORDER — SODIUM CHLORIDE 0.9 % (FLUSH) 0.9 %
5-40 SYRINGE (ML) INJECTION PRN
Status: DISCONTINUED | OUTPATIENT
Start: 2022-01-01 | End: 2022-01-01 | Stop reason: HOSPADM

## 2022-01-01 RX ORDER — FUROSEMIDE 10 MG/ML
40 INJECTION INTRAMUSCULAR; INTRAVENOUS ONCE
Status: COMPLETED | OUTPATIENT
Start: 2022-01-01 | End: 2022-01-01

## 2022-01-01 RX ORDER — SODIUM CHLORIDE 9 MG/ML
INJECTION, SOLUTION INTRAVENOUS PRN
Status: DISCONTINUED | OUTPATIENT
Start: 2022-01-01 | End: 2022-01-01 | Stop reason: HOSPADM

## 2022-01-01 RX ORDER — FENTANYL CITRATE 50 UG/ML
50 INJECTION, SOLUTION INTRAMUSCULAR; INTRAVENOUS ONCE
Status: COMPLETED | OUTPATIENT
Start: 2022-01-01 | End: 2022-01-01

## 2022-01-01 RX ORDER — MULTIVIT WITH MINERALS/LUTEIN
1 TABLET ORAL DAILY
Status: DISCONTINUED | OUTPATIENT
Start: 2022-01-01 | End: 2022-01-01

## 2022-01-01 RX ORDER — SODIUM CHLORIDE 9 MG/ML
INJECTION, SOLUTION INTRAVENOUS PRN
Status: DISCONTINUED | OUTPATIENT
Start: 2022-01-01 | End: 2022-01-01 | Stop reason: SDUPTHER

## 2022-01-01 RX ORDER — BETAMETHASONE DIPROPIONATE 0.05 %
OINTMENT (GRAM) TOPICAL ONCE
Status: CANCELLED | OUTPATIENT
Start: 2022-01-01 | End: 2022-01-01

## 2022-01-01 RX ORDER — SODIUM CHLORIDE 9 MG/ML
25 INJECTION, SOLUTION INTRAVENOUS PRN
Status: DISCONTINUED | OUTPATIENT
Start: 2022-01-01 | End: 2022-01-01 | Stop reason: HOSPADM

## 2022-01-01 RX ORDER — ASPIRIN 81 MG/1
81 TABLET ORAL DAILY
COMMUNITY

## 2022-01-01 RX ORDER — ALBUTEROL SULFATE 90 UG/1
2 AEROSOL, METERED RESPIRATORY (INHALATION)
Status: DISCONTINUED | OUTPATIENT
Start: 2022-01-01 | End: 2022-01-01

## 2022-01-01 RX ORDER — FUROSEMIDE 20 MG/1
20 TABLET ORAL
Status: DISCONTINUED | OUTPATIENT
Start: 2022-01-01 | End: 2022-01-01

## 2022-01-01 RX ORDER — ACETAMINOPHEN 325 MG/1
650 TABLET ORAL EVERY 6 HOURS PRN
Status: DISCONTINUED | OUTPATIENT
Start: 2022-01-01 | End: 2022-01-01 | Stop reason: HOSPADM

## 2022-01-01 RX ORDER — FUROSEMIDE 20 MG/1
TABLET ORAL
Qty: 90 TABLET | Refills: 3 | Status: SHIPPED | OUTPATIENT
Start: 2022-01-01 | End: 2022-01-01

## 2022-01-01 RX ORDER — SODIUM CHLORIDE 0.9 % (FLUSH) 0.9 %
5-40 SYRINGE (ML) INJECTION EVERY 12 HOURS SCHEDULED
Status: CANCELLED | OUTPATIENT
Start: 2022-01-01

## 2022-01-01 RX ORDER — SODIUM CHLORIDE 0.9 % (FLUSH) 0.9 %
5-40 SYRINGE (ML) INJECTION EVERY 12 HOURS SCHEDULED
Status: DISCONTINUED | OUTPATIENT
Start: 2022-01-01 | End: 2022-01-01 | Stop reason: HOSPADM

## 2022-01-01 RX ORDER — AMOXICILLIN AND CLAVULANATE POTASSIUM 875; 125 MG/1; MG/1
1 TABLET, FILM COATED ORAL 2 TIMES DAILY
Qty: 20 TABLET | Refills: 0 | Status: SHIPPED | OUTPATIENT
Start: 2022-01-01 | End: 2022-01-01

## 2022-01-01 RX ORDER — ENOXAPARIN SODIUM 100 MG/ML
40 INJECTION SUBCUTANEOUS DAILY
Status: DISCONTINUED | OUTPATIENT
Start: 2022-01-01 | End: 2022-01-01 | Stop reason: HOSPADM

## 2022-01-01 RX ORDER — POTASSIUM CHLORIDE 20 MEQ/1
40 TABLET, EXTENDED RELEASE ORAL ONCE
Status: COMPLETED | OUTPATIENT
Start: 2022-01-01 | End: 2022-01-01

## 2022-01-01 RX ORDER — IPRATROPIUM BROMIDE AND ALBUTEROL SULFATE 2.5; .5 MG/3ML; MG/3ML
1 SOLUTION RESPIRATORY (INHALATION) EVERY 4 HOURS
Status: DISCONTINUED | OUTPATIENT
Start: 2022-01-01 | End: 2022-01-01 | Stop reason: HOSPADM

## 2022-01-01 RX ORDER — SODIUM CHLORIDE FOR INHALATION 0.9 %
3 VIAL, NEBULIZER (ML) INHALATION
Status: DISCONTINUED | OUTPATIENT
Start: 2022-01-01 | End: 2022-01-01

## 2022-01-01 RX ORDER — CIPROFLOXACIN 500 MG/1
500 TABLET, FILM COATED ORAL 2 TIMES DAILY
Qty: 20 TABLET | Refills: 0 | Status: SHIPPED | OUTPATIENT
Start: 2022-01-01 | End: 2022-01-01

## 2022-01-01 RX ORDER — FUROSEMIDE 10 MG/ML
20 INJECTION INTRAMUSCULAR; INTRAVENOUS 2 TIMES DAILY
Status: DISCONTINUED | OUTPATIENT
Start: 2022-01-01 | End: 2022-01-01

## 2022-01-01 RX ORDER — POTASSIUM CHLORIDE 750 MG/1
10 TABLET, EXTENDED RELEASE ORAL DAILY
Qty: 30 TABLET | Refills: 5 | Status: SHIPPED | OUTPATIENT
Start: 2022-01-01 | End: 2022-01-01 | Stop reason: SDUPTHER

## 2022-01-01 RX ORDER — POTASSIUM CHLORIDE 750 MG/1
10 TABLET, EXTENDED RELEASE ORAL DAILY
Qty: 90 TABLET | Refills: 5 | Status: SHIPPED | OUTPATIENT
Start: 2022-01-01 | End: 2022-01-01 | Stop reason: SDUPTHER

## 2022-01-01 RX ORDER — FUROSEMIDE 20 MG/1
20 TABLET ORAL 2 TIMES DAILY
Qty: 180 TABLET | Refills: 3 | Status: SHIPPED | OUTPATIENT
Start: 2022-01-01

## 2022-01-01 RX ORDER — ONDANSETRON 4 MG/1
4 TABLET, ORALLY DISINTEGRATING ORAL EVERY 8 HOURS PRN
Status: DISCONTINUED | OUTPATIENT
Start: 2022-01-01 | End: 2022-01-01 | Stop reason: HOSPADM

## 2022-01-01 RX ORDER — ASPIRIN 81 MG/1
81 TABLET ORAL DAILY
Status: DISCONTINUED | OUTPATIENT
Start: 2022-01-01 | End: 2022-01-01 | Stop reason: HOSPADM

## 2022-01-01 RX ORDER — FUROSEMIDE 20 MG/1
20 TABLET ORAL DAILY
Qty: 30 TABLET | Refills: 5 | Status: SHIPPED | OUTPATIENT
Start: 2022-01-01 | End: 2022-01-01

## 2022-01-01 RX ORDER — AMIODARONE HYDROCHLORIDE 200 MG/1
200 TABLET ORAL 2 TIMES DAILY
Status: DISCONTINUED | OUTPATIENT
Start: 2022-01-01 | End: 2022-01-01 | Stop reason: HOSPADM

## 2022-01-01 RX ORDER — SODIUM CHLORIDE 0.9 % (FLUSH) 0.9 %
5-40 SYRINGE (ML) INJECTION PRN
Status: CANCELLED | OUTPATIENT
Start: 2022-01-01

## 2022-01-01 RX ORDER — LEVOTHYROXINE SODIUM 0.03 MG/1
37.5 TABLET ORAL DAILY
Qty: 135 TABLET | Refills: 0 | Status: SHIPPED | OUTPATIENT
Start: 2022-01-01 | End: 2022-01-01 | Stop reason: SDUPTHER

## 2022-01-01 RX ORDER — LEVOTHYROXINE SODIUM 0.03 MG/1
25 TABLET ORAL DAILY
Status: DISCONTINUED | OUTPATIENT
Start: 2022-01-01 | End: 2022-01-01 | Stop reason: HOSPADM

## 2022-01-01 RX ORDER — LEVOTHYROXINE SODIUM 0.03 MG/1
37.5 TABLET ORAL DAILY
Qty: 135 TABLET | Refills: 0 | Status: SHIPPED | OUTPATIENT
Start: 2022-01-01

## 2022-01-01 RX ORDER — BENZONATATE 100 MG/1
100 CAPSULE ORAL 3 TIMES DAILY PRN
Status: DISCONTINUED | OUTPATIENT
Start: 2022-01-01 | End: 2022-01-01 | Stop reason: HOSPADM

## 2022-01-01 RX ORDER — FUROSEMIDE 40 MG/1
40 TABLET ORAL
Status: DISCONTINUED | OUTPATIENT
Start: 2022-01-01 | End: 2022-01-01

## 2022-01-01 RX ORDER — FUROSEMIDE 40 MG/1
40 TABLET ORAL DAILY
Qty: 90 TABLET | Refills: 5 | Status: SHIPPED | OUTPATIENT
Start: 2022-01-01 | End: 2022-01-01 | Stop reason: SDUPTHER

## 2022-01-01 RX ORDER — ONDANSETRON 2 MG/ML
4 INJECTION INTRAMUSCULAR; INTRAVENOUS EVERY 6 HOURS PRN
Status: DISCONTINUED | OUTPATIENT
Start: 2022-01-01 | End: 2022-01-01 | Stop reason: HOSPADM

## 2022-01-01 RX ORDER — ALBUTEROL SULFATE 2.5 MG/3ML
2.5 SOLUTION RESPIRATORY (INHALATION)
Status: DISCONTINUED | OUTPATIENT
Start: 2022-01-01 | End: 2022-01-01 | Stop reason: HOSPADM

## 2022-01-01 RX ORDER — SODIUM CHLORIDE 9 MG/ML
INJECTION, SOLUTION INTRAVENOUS CONTINUOUS
Status: DISCONTINUED | OUTPATIENT
Start: 2022-01-01 | End: 2022-01-01

## 2022-01-01 RX ORDER — SODIUM CHLORIDE 9 MG/ML
INJECTION, SOLUTION INTRAVENOUS CONTINUOUS
Status: DISCONTINUED | OUTPATIENT
Start: 2022-01-01 | End: 2022-01-01 | Stop reason: HOSPADM

## 2022-01-01 RX ORDER — FUROSEMIDE 40 MG/1
40 TABLET ORAL DAILY
Qty: 30 TABLET | Refills: 3 | Status: SHIPPED | OUTPATIENT
Start: 2022-01-01 | End: 2022-01-01 | Stop reason: SDUPTHER

## 2022-01-01 RX ORDER — AMIODARONE HYDROCHLORIDE 200 MG/1
200 TABLET ORAL ONCE
Status: DISCONTINUED | OUTPATIENT
Start: 2022-01-01 | End: 2022-01-01

## 2022-01-01 RX ORDER — POLYETHYLENE GLYCOL 3350 17 G/17G
17 POWDER, FOR SOLUTION ORAL DAILY PRN
Status: DISCONTINUED | OUTPATIENT
Start: 2022-01-01 | End: 2022-01-01 | Stop reason: HOSPADM

## 2022-01-01 RX ORDER — WHEELCHAIR
EACH MISCELLANEOUS
Qty: 1 EACH | Refills: 0 | Status: SHIPPED | OUTPATIENT
Start: 2022-01-01

## 2022-01-01 RX ORDER — FUROSEMIDE 40 MG/1
TABLET ORAL
Qty: 135 TABLET | Refills: 0 | Status: ON HOLD | OUTPATIENT
Start: 2022-01-01 | End: 2022-01-01 | Stop reason: HOSPADM

## 2022-01-01 RX ORDER — FUROSEMIDE 10 MG/ML
20 INJECTION INTRAMUSCULAR; INTRAVENOUS DAILY
Status: DISCONTINUED | OUTPATIENT
Start: 2022-01-01 | End: 2022-01-01 | Stop reason: HOSPADM

## 2022-01-01 RX ORDER — MULTIVIT WITH MINERALS/LUTEIN
1 TABLET ORAL DAILY
Status: DISCONTINUED | OUTPATIENT
Start: 2022-01-01 | End: 2022-01-01 | Stop reason: CLARIF

## 2022-01-01 RX ORDER — LEVOTHYROXINE SODIUM 0.03 MG/1
25 TABLET ORAL DAILY
Qty: 30 TABLET | Refills: 3 | OUTPATIENT
Start: 2022-01-01

## 2022-01-01 RX ADMIN — IPRATROPIUM BROMIDE AND ALBUTEROL SULFATE 1 AMPULE: .5; 3 SOLUTION RESPIRATORY (INHALATION) at 13:21

## 2022-01-01 RX ADMIN — MICONAZOLE NITRATE: 2 POWDER TOPICAL at 22:44

## 2022-01-01 RX ADMIN — ENOXAPARIN SODIUM 40 MG: 100 INJECTION SUBCUTANEOUS at 08:41

## 2022-01-01 RX ADMIN — SODIUM CHLORIDE, PRESERVATIVE FREE 10 ML: 5 INJECTION INTRAVENOUS at 08:47

## 2022-01-01 RX ADMIN — SODIUM CHLORIDE, PRESERVATIVE FREE 10 ML: 5 INJECTION INTRAVENOUS at 22:13

## 2022-01-01 RX ADMIN — IPRATROPIUM BROMIDE AND ALBUTEROL SULFATE 1 AMPULE: .5; 3 SOLUTION RESPIRATORY (INHALATION) at 04:08

## 2022-01-01 RX ADMIN — FENTANYL CITRATE 50 MCG: 50 INJECTION, SOLUTION INTRAMUSCULAR; INTRAVENOUS at 18:50

## 2022-01-01 RX ADMIN — ASPIRIN 81 MG: 81 TABLET, COATED ORAL at 08:51

## 2022-01-01 RX ADMIN — FUROSEMIDE 20 MG: 10 INJECTION, SOLUTION INTRAMUSCULAR; INTRAVENOUS at 18:21

## 2022-01-01 RX ADMIN — ENOXAPARIN SODIUM 40 MG: 100 INJECTION SUBCUTANEOUS at 08:51

## 2022-01-01 RX ADMIN — MICONAZOLE NITRATE: 20 POWDER TOPICAL at 23:29

## 2022-01-01 RX ADMIN — CEFTRIAXONE 1000 MG: 1 INJECTION, POWDER, FOR SOLUTION INTRAMUSCULAR; INTRAVENOUS at 21:11

## 2022-01-01 RX ADMIN — LEVOTHYROXINE SODIUM 25 MCG: 0.03 TABLET ORAL at 11:02

## 2022-01-01 RX ADMIN — IPRATROPIUM BROMIDE AND ALBUTEROL SULFATE 1 AMPULE: .5; 3 SOLUTION RESPIRATORY (INHALATION) at 07:42

## 2022-01-01 RX ADMIN — ENOXAPARIN SODIUM 100 MG: 100 INJECTION SUBCUTANEOUS at 08:10

## 2022-01-01 RX ADMIN — ASPIRIN 81 MG: 81 TABLET, COATED ORAL at 08:24

## 2022-01-01 RX ADMIN — MICONAZOLE NITRATE: 2 POWDER TOPICAL at 21:52

## 2022-01-01 RX ADMIN — IPRATROPIUM BROMIDE AND ALBUTEROL SULFATE 1 AMPULE: .5; 3 SOLUTION RESPIRATORY (INHALATION) at 17:38

## 2022-01-01 RX ADMIN — CEFTRIAXONE 1000 MG: 1 INJECTION, POWDER, FOR SOLUTION INTRAMUSCULAR; INTRAVENOUS at 22:34

## 2022-01-01 RX ADMIN — VANCOMYCIN HYDROCHLORIDE 1000 MG: 1 INJECTION, POWDER, LYOPHILIZED, FOR SOLUTION INTRAVENOUS at 22:31

## 2022-01-01 RX ADMIN — MULTIVITAMIN TABLET 1 TABLET: TABLET at 11:02

## 2022-01-01 RX ADMIN — METOPROLOL TARTRATE 25 MG: 25 TABLET, FILM COATED ORAL at 08:25

## 2022-01-01 RX ADMIN — POTASSIUM CHLORIDE 10 MEQ: 20 TABLET, EXTENDED RELEASE ORAL at 08:25

## 2022-01-01 RX ADMIN — ACETAMINOPHEN 650 MG: 325 TABLET ORAL at 06:47

## 2022-01-01 RX ADMIN — ENOXAPARIN SODIUM 100 MG: 100 INJECTION SUBCUTANEOUS at 22:32

## 2022-01-01 RX ADMIN — SODIUM CHLORIDE, PRESERVATIVE FREE 10 ML: 5 INJECTION INTRAVENOUS at 22:41

## 2022-01-01 RX ADMIN — IPRATROPIUM BROMIDE AND ALBUTEROL SULFATE 1 AMPULE: .5; 3 SOLUTION RESPIRATORY (INHALATION) at 19:18

## 2022-01-01 RX ADMIN — BENZONATATE 100 MG: 100 CAPSULE ORAL at 23:05

## 2022-01-01 RX ADMIN — IPRATROPIUM BROMIDE AND ALBUTEROL SULFATE 1 AMPULE: .5; 3 SOLUTION RESPIRATORY (INHALATION) at 20:03

## 2022-01-01 RX ADMIN — ENOXAPARIN SODIUM 40 MG: 100 INJECTION SUBCUTANEOUS at 08:24

## 2022-01-01 RX ADMIN — ASPIRIN 81 MG: 81 TABLET, COATED ORAL at 08:41

## 2022-01-01 RX ADMIN — SODIUM CHLORIDE, PRESERVATIVE FREE 10 ML: 5 INJECTION INTRAVENOUS at 08:10

## 2022-01-01 RX ADMIN — LEVOTHYROXINE SODIUM 37.5 MCG: 0.07 TABLET ORAL at 06:10

## 2022-01-01 RX ADMIN — SODIUM CHLORIDE 250 ML: 9 INJECTION, SOLUTION INTRAVENOUS at 00:09

## 2022-01-01 RX ADMIN — CIPROFLOXACIN 400 MG: 2 INJECTION, SOLUTION INTRAVENOUS at 08:47

## 2022-01-01 RX ADMIN — SODIUM CHLORIDE, PRESERVATIVE FREE 10 ML: 5 INJECTION INTRAVENOUS at 18:02

## 2022-01-01 RX ADMIN — METOPROLOL TARTRATE 12.5 MG: 25 TABLET, FILM COATED ORAL at 21:37

## 2022-01-01 RX ADMIN — METOPROLOL TARTRATE 25 MG: 25 TABLET, FILM COATED ORAL at 21:05

## 2022-01-01 RX ADMIN — LEVOTHYROXINE SODIUM 37.5 MCG: 0.07 TABLET ORAL at 06:17

## 2022-01-01 RX ADMIN — METOPROLOL TARTRATE 25 MG: 25 TABLET, FILM COATED ORAL at 22:29

## 2022-01-01 RX ADMIN — SODIUM CHLORIDE, PRESERVATIVE FREE 10 ML: 5 INJECTION INTRAVENOUS at 21:38

## 2022-01-01 RX ADMIN — SODIUM CHLORIDE, PRESERVATIVE FREE 10 ML: 5 INJECTION INTRAVENOUS at 08:51

## 2022-01-01 RX ADMIN — MICONAZOLE NITRATE: 20 POWDER TOPICAL at 08:13

## 2022-01-01 RX ADMIN — METOPROLOL TARTRATE 25 MG: 25 TABLET, FILM COATED ORAL at 08:50

## 2022-01-01 RX ADMIN — AMIODARONE HYDROCHLORIDE 200 MG: 200 TABLET ORAL at 13:18

## 2022-01-01 RX ADMIN — SODIUM CHLORIDE, PRESERVATIVE FREE 10 ML: 5 INJECTION INTRAVENOUS at 20:49

## 2022-01-01 RX ADMIN — MICONAZOLE NITRATE: 2 POWDER TOPICAL at 08:41

## 2022-01-01 RX ADMIN — FUROSEMIDE 40 MG: 40 TABLET ORAL at 06:10

## 2022-01-01 RX ADMIN — ENOXAPARIN SODIUM 40 MG: 100 INJECTION SUBCUTANEOUS at 08:50

## 2022-01-01 RX ADMIN — MICONAZOLE NITRATE: 2 POWDER TOPICAL at 20:09

## 2022-01-01 RX ADMIN — MICONAZOLE NITRATE: 2 POWDER TOPICAL at 21:11

## 2022-01-01 RX ADMIN — SODIUM CHLORIDE, PRESERVATIVE FREE 10 ML: 5 INJECTION INTRAVENOUS at 19:31

## 2022-01-01 RX ADMIN — IPRATROPIUM BROMIDE AND ALBUTEROL SULFATE 1 AMPULE: .5; 3 SOLUTION RESPIRATORY (INHALATION) at 09:01

## 2022-01-01 RX ADMIN — MICONAZOLE NITRATE: 2 POWDER TOPICAL at 08:51

## 2022-01-01 RX ADMIN — IPRATROPIUM BROMIDE AND ALBUTEROL SULFATE 1 AMPULE: .5; 3 SOLUTION RESPIRATORY (INHALATION) at 04:09

## 2022-01-01 RX ADMIN — SODIUM CHLORIDE 500 ML: 9 INJECTION, SOLUTION INTRAVENOUS at 14:44

## 2022-01-01 RX ADMIN — METOPROLOL TARTRATE 12.5 MG: 25 TABLET, FILM COATED ORAL at 08:11

## 2022-01-01 RX ADMIN — FUROSEMIDE 40 MG: 10 INJECTION, SOLUTION INTRAMUSCULAR; INTRAVENOUS at 22:13

## 2022-01-01 RX ADMIN — SODIUM CHLORIDE: 9 INJECTION, SOLUTION INTRAVENOUS at 22:31

## 2022-01-01 RX ADMIN — POTASSIUM CHLORIDE 10 MEQ: 20 TABLET, EXTENDED RELEASE ORAL at 08:48

## 2022-01-01 RX ADMIN — METOPROLOL TARTRATE 25 MG: 25 TABLET, FILM COATED ORAL at 20:21

## 2022-01-01 RX ADMIN — LEVOTHYROXINE SODIUM 37.5 MCG: 0.07 TABLET ORAL at 06:46

## 2022-01-01 RX ADMIN — FUROSEMIDE 20 MG: 10 INJECTION, SOLUTION INTRAMUSCULAR; INTRAVENOUS at 18:01

## 2022-01-01 RX ADMIN — MICONAZOLE NITRATE: 2 POWDER TOPICAL at 08:25

## 2022-01-01 RX ADMIN — MULTIVITAMIN TABLET 1 TABLET: TABLET at 08:11

## 2022-01-01 RX ADMIN — IOPAMIDOL 100 ML: 755 INJECTION, SOLUTION INTRAVENOUS at 15:25

## 2022-01-01 RX ADMIN — FUROSEMIDE 20 MG: 10 INJECTION, SOLUTION INTRAMUSCULAR; INTRAVENOUS at 11:03

## 2022-01-01 RX ADMIN — FUROSEMIDE 20 MG: 10 INJECTION, SOLUTION INTRAMUSCULAR; INTRAVENOUS at 18:41

## 2022-01-01 RX ADMIN — POTASSIUM CHLORIDE 40 MEQ: 20 TABLET, EXTENDED RELEASE ORAL at 22:32

## 2022-01-01 RX ADMIN — CIPROFLOXACIN 400 MG: 2 INJECTION, SOLUTION INTRAVENOUS at 20:09

## 2022-01-01 RX ADMIN — SODIUM CHLORIDE, PRESERVATIVE FREE 10 ML: 5 INJECTION INTRAVENOUS at 08:25

## 2022-01-01 RX ADMIN — MICONAZOLE NITRATE: 20 POWDER TOPICAL at 22:37

## 2022-01-01 RX ADMIN — CEFTRIAXONE 1000 MG: 1 INJECTION, POWDER, FOR SOLUTION INTRAMUSCULAR; INTRAVENOUS at 21:14

## 2022-01-01 RX ADMIN — ENOXAPARIN SODIUM 100 MG: 100 INJECTION SUBCUTANEOUS at 20:48

## 2022-01-01 RX ADMIN — FUROSEMIDE 20 MG: 10 INJECTION, SOLUTION INTRAMUSCULAR; INTRAVENOUS at 19:46

## 2022-01-01 RX ADMIN — METOPROLOL TARTRATE 12.5 MG: 25 TABLET, FILM COATED ORAL at 11:03

## 2022-01-01 RX ADMIN — SODIUM CHLORIDE, PRESERVATIVE FREE 10 ML: 5 INJECTION INTRAVENOUS at 21:12

## 2022-01-01 RX ADMIN — METOPROLOL TARTRATE 25 MG: 25 TABLET, FILM COATED ORAL at 21:51

## 2022-01-01 RX ADMIN — SODIUM CHLORIDE, PRESERVATIVE FREE 10 ML: 5 INJECTION INTRAVENOUS at 11:03

## 2022-01-01 RX ADMIN — FUROSEMIDE 20 MG: 10 INJECTION, SOLUTION INTRAMUSCULAR; INTRAVENOUS at 19:31

## 2022-01-01 RX ADMIN — POTASSIUM CHLORIDE 10 MEQ: 20 TABLET, EXTENDED RELEASE ORAL at 08:41

## 2022-01-01 RX ADMIN — CEFTRIAXONE 1000 MG: 1 INJECTION, POWDER, FOR SOLUTION INTRAMUSCULAR; INTRAVENOUS at 22:12

## 2022-01-01 RX ADMIN — LEVOTHYROXINE SODIUM 25 MCG: 0.03 TABLET ORAL at 07:08

## 2022-01-01 RX ADMIN — IPRATROPIUM BROMIDE AND ALBUTEROL SULFATE 1 AMPULE: .5; 3 SOLUTION RESPIRATORY (INHALATION) at 23:36

## 2022-01-01 RX ADMIN — ASPIRIN 81 MG: 81 TABLET, COATED ORAL at 08:48

## 2022-01-01 RX ADMIN — SODIUM CHLORIDE, PRESERVATIVE FREE 10 ML: 5 INJECTION INTRAVENOUS at 18:21

## 2022-01-01 RX ADMIN — ENOXAPARIN SODIUM 100 MG: 100 INJECTION SUBCUTANEOUS at 11:04

## 2022-01-01 RX ADMIN — IPRATROPIUM BROMIDE AND ALBUTEROL SULFATE 1 AMPULE: .5; 3 SOLUTION RESPIRATORY (INHALATION) at 12:30

## 2022-01-01 RX ADMIN — ACETAMINOPHEN 650 MG: 325 TABLET ORAL at 23:41

## 2022-01-01 RX ADMIN — METOPROLOL TARTRATE 12.5 MG: 25 TABLET, FILM COATED ORAL at 22:32

## 2022-01-01 RX ADMIN — MICONAZOLE NITRATE: 2 POWDER TOPICAL at 08:50

## 2022-01-01 RX ADMIN — SODIUM CHLORIDE, PRESERVATIVE FREE 10 ML: 5 INJECTION INTRAVENOUS at 20:09

## 2022-01-01 RX ADMIN — METOPROLOL TARTRATE 12.5 MG: 25 TABLET, FILM COATED ORAL at 20:49

## 2022-01-01 RX ADMIN — IPRATROPIUM BROMIDE AND ALBUTEROL SULFATE 1 AMPULE: .5; 3 SOLUTION RESPIRATORY (INHALATION) at 17:30

## 2022-01-01 RX ADMIN — ENOXAPARIN SODIUM 100 MG: 100 INJECTION SUBCUTANEOUS at 21:38

## 2022-01-01 RX ADMIN — FUROSEMIDE 20 MG: 10 INJECTION, SOLUTION INTRAMUSCULAR; INTRAVENOUS at 08:25

## 2022-01-01 RX ADMIN — FUROSEMIDE 20 MG: 10 INJECTION, SOLUTION INTRAMUSCULAR; INTRAVENOUS at 08:10

## 2022-01-01 RX ADMIN — IPRATROPIUM BROMIDE AND ALBUTEROL SULFATE 1 AMPULE: .5; 3 SOLUTION RESPIRATORY (INHALATION) at 04:25

## 2022-01-01 RX ADMIN — LEVOTHYROXINE SODIUM 37.5 MCG: 0.07 TABLET ORAL at 05:58

## 2022-01-01 RX ADMIN — IPRATROPIUM BROMIDE AND ALBUTEROL SULFATE 1 AMPULE: .5; 3 SOLUTION RESPIRATORY (INHALATION) at 00:00

## 2022-01-01 RX ADMIN — AMIODARONE HYDROCHLORIDE 200 MG: 200 TABLET ORAL at 22:32

## 2022-01-01 RX ADMIN — AMIODARONE HYDROCHLORIDE 200 MG: 200 TABLET ORAL at 08:11

## 2022-01-01 RX ADMIN — POTASSIUM CHLORIDE 10 MEQ: 20 TABLET, EXTENDED RELEASE ORAL at 08:51

## 2022-01-01 RX ADMIN — SODIUM CHLORIDE 1000 ML: 9 INJECTION, SOLUTION INTRAVENOUS at 16:25

## 2022-01-01 SDOH — ECONOMIC STABILITY: FOOD INSECURITY: WITHIN THE PAST 12 MONTHS, THE FOOD YOU BOUGHT JUST DIDN'T LAST AND YOU DIDN'T HAVE MONEY TO GET MORE.: NEVER TRUE

## 2022-01-01 SDOH — ECONOMIC STABILITY: FOOD INSECURITY: WITHIN THE PAST 12 MONTHS, YOU WORRIED THAT YOUR FOOD WOULD RUN OUT BEFORE YOU GOT MONEY TO BUY MORE.: NEVER TRUE

## 2022-01-01 ASSESSMENT — ENCOUNTER SYMPTOMS
BLOOD IN STOOL: 0
EYE PAIN: 0
SHORTNESS OF BREATH: 1
TROUBLE SWALLOWING: 0
COLOR CHANGE: 1
TROUBLE SWALLOWING: 0
NAUSEA: 1
COUGH: 1
EYE PAIN: 0
NAUSEA: 0
SORE THROAT: 0
BACK PAIN: 0
VOMITING: 0
VOMITING: 0
CHEST TIGHTNESS: 0
COUGH: 0
CHEST TIGHTNESS: 0
BACK PAIN: 0
COUGH: 0
DIARRHEA: 0
NAUSEA: 0
CONSTIPATION: 0
EYE PAIN: 0
ABDOMINAL PAIN: 1
VOMITING: 0
SHORTNESS OF BREATH: 1
CONSTIPATION: 0
VOMITING: 0
SHORTNESS OF BREATH: 0
COLOR CHANGE: 0
SHORTNESS OF BREATH: 1
BLOOD IN STOOL: 0
SORE THROAT: 0
PHOTOPHOBIA: 0
RHINORRHEA: 0
CONSTIPATION: 0
RHINORRHEA: 0
ABDOMINAL PAIN: 0
BLOOD IN STOOL: 0
ABDOMINAL DISTENTION: 1
BACK PAIN: 0
WHEEZING: 0
DIARRHEA: 0
DIARRHEA: 0
NAUSEA: 0
ABDOMINAL PAIN: 0
VOMITING: 0
RHINORRHEA: 0
FACIAL SWELLING: 0
SINUS PRESSURE: 0
CONSTIPATION: 0
SHORTNESS OF BREATH: 0
WHEEZING: 0
SINUS PRESSURE: 0
CONSTIPATION: 0
COUGH: 0
EYE PAIN: 0
ABDOMINAL PAIN: 0
WHEEZING: 0
BLOOD IN STOOL: 0
COLOR CHANGE: 0
SHORTNESS OF BREATH: 0
FACIAL SWELLING: 0
DIARRHEA: 0
COUGH: 0
ABDOMINAL PAIN: 0
NAUSEA: 0
DIARRHEA: 0
SORE THROAT: 0
COUGH: 0
CHEST TIGHTNESS: 0

## 2022-01-01 ASSESSMENT — PAIN - FUNCTIONAL ASSESSMENT
PAIN_FUNCTIONAL_ASSESSMENT: NONE - DENIES PAIN
PAIN_FUNCTIONAL_ASSESSMENT: NONE - DENIES PAIN
PAIN_FUNCTIONAL_ASSESSMENT: 0-10
PAIN_FUNCTIONAL_ASSESSMENT: ACTIVITIES ARE NOT PREVENTED
PAIN_FUNCTIONAL_ASSESSMENT: 0-10

## 2022-01-01 ASSESSMENT — PATIENT HEALTH QUESTIONNAIRE - PHQ9
SUM OF ALL RESPONSES TO PHQ QUESTIONS 1-9: 0
SUM OF ALL RESPONSES TO PHQ QUESTIONS 1-9: 1
2. FEELING DOWN, DEPRESSED OR HOPELESS: 0
SUM OF ALL RESPONSES TO PHQ QUESTIONS 1-9: 0
SUM OF ALL RESPONSES TO PHQ9 QUESTIONS 1 & 2: 0
SUM OF ALL RESPONSES TO PHQ QUESTIONS 1-9: 1
SUM OF ALL RESPONSES TO PHQ QUESTIONS 1-9: 0
SUM OF ALL RESPONSES TO PHQ9 QUESTIONS 1 & 2: 1
SUM OF ALL RESPONSES TO PHQ9 QUESTIONS 1 & 2: 0
SUM OF ALL RESPONSES TO PHQ QUESTIONS 1-9: 1
1. LITTLE INTEREST OR PLEASURE IN DOING THINGS: 0
2. FEELING DOWN, DEPRESSED OR HOPELESS: 0
SUM OF ALL RESPONSES TO PHQ QUESTIONS 1-9: 0
SUM OF ALL RESPONSES TO PHQ QUESTIONS 1-9: 1
1. LITTLE INTEREST OR PLEASURE IN DOING THINGS: 1
1. LITTLE INTEREST OR PLEASURE IN DOING THINGS: 0
SUM OF ALL RESPONSES TO PHQ QUESTIONS 1-9: 0
2. FEELING DOWN, DEPRESSED OR HOPELESS: 0
SUM OF ALL RESPONSES TO PHQ QUESTIONS 1-9: 0

## 2022-01-01 ASSESSMENT — LIFESTYLE VARIABLES
HOW MANY STANDARD DRINKS CONTAINING ALCOHOL DO YOU HAVE ON A TYPICAL DAY: PATIENT DECLINED
HOW OFTEN DO YOU HAVE A DRINK CONTAINING ALCOHOL: NEVER
HOW OFTEN DO YOU HAVE A DRINK CONTAINING ALCOHOL: NEVER

## 2022-01-01 ASSESSMENT — PAIN DESCRIPTION - ORIENTATION
ORIENTATION: MID
ORIENTATION: LEFT

## 2022-01-01 ASSESSMENT — PAIN SCALES - GENERAL
PAINLEVEL_OUTOF10: 0
PAINLEVEL_OUTOF10: 9
PAINLEVEL_OUTOF10: 0
PAINLEVEL_OUTOF10: 9
PAINLEVEL_OUTOF10: 0
PAINLEVEL_OUTOF10: 0
PAINLEVEL_OUTOF10: 8
PAINLEVEL_OUTOF10: 0
PAINLEVEL_OUTOF10: 9
PAINLEVEL_OUTOF10: 2
PAINLEVEL_OUTOF10: 0
PAINLEVEL_OUTOF10: 0
PAINLEVEL_OUTOF10: 7
PAINLEVEL_OUTOF10: 0
PAINLEVEL_OUTOF10: 0
PAINLEVEL_OUTOF10: 5
PAINLEVEL_OUTOF10: 0

## 2022-01-01 ASSESSMENT — PAIN DESCRIPTION - LOCATION
LOCATION: ABDOMEN
LOCATION: FOOT
LOCATION: ABDOMEN

## 2022-01-01 ASSESSMENT — PAIN SCALES - WONG BAKER
WONGBAKER_NUMERICALRESPONSE: 0
WONGBAKER_NUMERICALRESPONSE: 0

## 2022-01-01 ASSESSMENT — PAIN DESCRIPTION - PAIN TYPE: TYPE: ACUTE PAIN

## 2022-01-01 ASSESSMENT — PAIN DESCRIPTION - ONSET: ONSET: ON-GOING

## 2022-01-01 ASSESSMENT — PAIN DESCRIPTION - DESCRIPTORS
DESCRIPTORS: CRAMPING
DESCRIPTORS: OTHER (COMMENT);ACHING
DESCRIPTORS: CRAMPING

## 2022-01-01 ASSESSMENT — PAIN DESCRIPTION - FREQUENCY: FREQUENCY: CONTINUOUS

## 2022-01-01 ASSESSMENT — SOCIAL DETERMINANTS OF HEALTH (SDOH): HOW HARD IS IT FOR YOU TO PAY FOR THE VERY BASICS LIKE FOOD, HOUSING, MEDICAL CARE, AND HEATING?: NOT HARD AT ALL

## 2022-02-08 PROBLEM — I50.43 CHF (CONGESTIVE HEART FAILURE), NYHA CLASS I, ACUTE ON CHRONIC, COMBINED (HCC): Status: ACTIVE | Noted: 2022-01-01

## 2022-02-08 PROBLEM — I48.91 UNSPECIFIED ATRIAL FIBRILLATION (HCC): Status: ACTIVE | Noted: 2022-01-01

## 2022-02-08 PROBLEM — J90 PLEURAL EFFUSION, NOT ELSEWHERE CLASSIFIED: Status: ACTIVE | Noted: 2022-01-01

## 2022-02-08 NOTE — PROGRESS NOTES
Report called to Nina with [facility] PCU. Patient transferred to PCU by wheelchair.

## 2022-02-08 NOTE — PROGRESS NOTES
02/08/22  Kai Hernandez  1935      Chief Complaint:   1. New onset atrial fibrillation ([address]    2. Routine physical examination    3. Edema, unspecified type    4. MC (dyspnea on exertion)    5. Encounter for screening for COVID-19        HPI:  69-year-old patient new to establish into internal medicine. Patient states only history of empyema to the left side. Family with her, son and daughter, states that since October they have noticed increased swelling to lower extremities. Patient states her clothes are getting tight her abdomen is swollen. States if she sits up in a chair with her legs down her legs swell worse if she puts them up in the air it goes into her abdomen and makes it hard for her to breathe. She denies any shortness of breath at rest.  Initially denies any shortness of breath with activity however after further conversation admits that she has to sit down frequently because she becomes short of breath and has heart palpitations. Only current medications include multivitamin and omega-3. States she is a child with 9 siblings and all of them have always been well and take no medication. No Known Allergies    Past Medical History:   Diagnosis Date    Empyema [facility])        History reviewed. No pertinent surgical history. Current Outpatient Medications on File Prior to Visit   Medication Sig Dispense Refill    Multiple Vitamins-Minerals (CENTRUM SILVER PO) Take 1 tablet by mouth daily      Omega-3 Fatty Acids (FISH OIL PO) Take 1 tablet by mouth daily       No current facility-administered medications on file prior to visit. Social History     Socioeconomic History    Marital status:       Spouse name: Not on file    Number of children: Not on file    Years of education: Not on file    Highest education level: Not on file   Occupational History    Not on file   Tobacco Use    Smoking status: Never Smoker    Smokeless tobacco: Never Used   Substance and Sexual Activity    Alcohol use: Not Currently    Drug use: Never    Sexual activity: Not on file   Other Topics Concern    Not on file   Social History Narrative    Not on file     Social Determinants of Health     Financial Resource Strain:     Difficulty of Paying Living Expenses: Not on file   Food Insecurity:     Worried About Running Out of Food in the Last Year: Not on file    Samy of Food in the Last Year: Not on file   Transportation Needs:     Lack of Transportation (Medical): Not on file    Lack of Transportation (Non-Medical): Not on file   Physical Activity:     Days of Exercise per Week: Not on file    Minutes of Exercise per Session: Not on file   Stress:     Feeling of Stress : Not on file   Social Connections:     Frequency of Communication with Friends and Family: Not on file    Frequency of Social Gatherings with Friends and Family: Not on file    Attends Gnosticism Services: Not on file    Active Member of [address] [place] [address] or Organizations: Not on file    Attends Club or Organization Meetings: Not on file    Marital Status: Not on file   Intimate Partner Violence:     Fear of Current or Ex-Partner: Not on file    Emotionally Abused: Not on file    Physically Abused: Not on file    Sexually Abused: Not on file   Housing Stability:     Unable to Pay for Housing in the Last Year: Not on file    Number of [place] in the Last Year: Not on file    Unstable Housing in the Last Year: Not on file       Review of Systems   Constitutional: Positive for activity change and fatigue. Negative for appetite change, chills, fever and unexpected weight change. HENT: Negative for congestion, dental problem, ear discharge, ear pain, facial swelling, hearing loss, postnasal drip, rhinorrhea, sinus pressure, sore throat and trouble swallowing. Eyes: Negative for pain and visual disturbance. Respiratory: Positive for cough and shortness of breath. Negative for chest tightness and wheezing. Cardiovascular: Positive for leg swelling. Negative for chest pain and palpitations. Gastrointestinal: Positive for abdominal distention. Negative for abdominal pain, blood in stool, constipation, diarrhea, nausea and vomiting. Endocrine: Negative for cold intolerance, heat intolerance and polyuria. Genitourinary: Negative for difficulty urinating. Musculoskeletal: Negative for arthralgias, gait problem, myalgias, neck pain and neck stiffness. Skin: Negative for color change, rash and wound. Neurological: Negative for dizziness, tremors, seizures, weakness, light-headedness, numbness and headaches. Psychiatric/Behavioral: Negative for confusion and hallucinations. The patient is not nervous/anxious. Physical Exam  Vitals and nursing note reviewed. Constitutional:       General: She is not in acute distress. Appearance: Normal appearance. She is well-developed. She is not diaphoretic. HENT:      Head: Normocephalic and atraumatic. Right Ear: External ear normal.      Left Ear: External ear normal.   Eyes:      General:         Right eye: No discharge. Left eye: No discharge. Neck:      Trachea: No tracheal deviation. Cardiovascular:      Rate and Rhythm: Tachycardia present. Rhythm irregular. Heart sounds: No friction rub. No gallop. Pulmonary:      Effort: Pulmonary effort is normal. No respiratory distress. Breath sounds: Normal breath sounds. No stridor. No wheezing, rhonchi or rales. Comments: Diminished right lower lobe  Chest:      Chest wall: No tenderness. Abdominal:      General: Bowel sounds are normal. There is distension. Palpations: Abdomen is soft. Tenderness: There is no abdominal tenderness. Musculoskeletal:         General: No swelling. Right lower leg: Edema present. Left lower leg: Edema (+4 edema up to bilateral thighs) present. Skin:     General: Skin is warm and dry.       Capillary Refill: Capillary refill takes less than 2 seconds. Coloration: Skin is not jaundiced or pale. Findings: Erythema (Mild erythremia behind knees bilateral) and rash (Erythemic rash under bilateral breast and right groin) present. Neurological:      General: No focal deficit present. Mental Status: She is alert and oriented to person, place, and time. Cranial Nerves: No cranial nerve deficit. Sensory: No sensory deficit. Coordination: Coordination normal.      Gait: Gait normal.   Psychiatric:         Mood and Affect: Mood normal.         Behavior: Behavior normal.         Thought Content: Thought content normal.         Judgment: Judgment normal.       Vitals:    02/08/22 1410 02/08/22 1555   BP: 124/76    Site: Left Upper Arm    Position: Sitting    Cuff Size: Large Adult    Pulse: 72    Resp:  16   SpO2:  93%   Weight: 212 lb (96.2 kg)    Height: 5' 6\" (1.676 m)        Assessment:  1. New onset atrial fibrillation ([address]  2. Routine physical examination  - CBC With Auto Differential; Future  - Comprehensive Metabolic Panel; Future    3. Edema, unspecified type   Brain Natriuretic Peptide; Future  - XR CHEST STANDARD (2 VW); Future  - EKG 12 lead; Future  - EKG 12 lead    4. MC (dyspnea on exertion)  - Brain Natriuretic Peptide; Future  - XR CHEST STANDARD (2 VW); Future  - EKG 12 lead; Future  - EKG 12 lead    5. Encounter for screening for COVID-19    Patient in need of prompt further cardiac work-up. New A. fib, moderate right effusion, significant edema, Dr. Braden Jeffries was kind enough to accept admission. Rapid Covid swab negative. Patient transferred to PCU by wheelchair son at side. Plan:  As noted above. Follow up for routine visit. Call sooner with concerns prior.     Electronically signed by JESSICA Curry CNP on 2/8/2022 at 4:37 PM

## 2022-02-09 NOTE — H&P
HOSPITALIST ADMISSION H&P      REASON FOR ADMISSION:  New onset Atrial fib, CHF, Pleural effusion  ESTIMATED LENGTH OF STAY: > 2 midnights, 3-4 days    ATTENDING/ADMITTING PHYSICIAN: Aldair Ruby MD  PCP: JESSICA Isabel CNP    HISTORY OF PRESENT ILLNESS:      The patient is a 81 y.o. female patient of JESSICA Isabel CNP who presents from MD office with c/o being \"swollen all up\" patient reports it started slow and then 3 weeks ago \"boomeranged\". Patient reports shortness of breath with activity, but not with conversation. Denies chest pain, nausea or vomiting. Patient reports she has not seen a doctor in \"40 years\". CKD: currently stage 2. Wounds and LDAs present prior to admission: None     See below for additional PMH. Patient tcjt-kjqvhlwtau-sdxjbpey-available records reviewed, including, but not limited to ER records, imaging results, lab results, office records, personal records, and OARRS -- no signs of abuse or diversion. Past Medical History:   Diagnosis Date    CHF (congestive heart failure) (HCC)     Empyema (HCC)            No past surgical history on file. Medications Prior to Admission:    Medications Prior to Admission: Multiple Vitamins-Minerals (CENTRUM SILVER PO), Take 1 tablet by mouth daily  Omega-3 Fatty Acids (FISH OIL PO), Take 1 tablet by mouth daily    Allergies:    Patient has no known allergies. Social History:    reports that she has never smoked. She has never used smokeless tobacco. She reports previous alcohol use. She reports that she does not use drugs. Family History:   Patient reports she is one of 10 children, 6 brothers-one of whom had cancer, but is unaware of type, and 3 sisters. Patient reports she is unaware of any other health issues in family members.     REVIEW OF SYSTEMS:  See HPI and problem list; otherwise no other new complaints with respect to eyes, ENT, neck, pulmonary, coronary, chest, GI, , endocrine, musculoskeletal, immune system/connective tissue disease, hematologic, neurologic, psychiatric, skin, lymphatics, or malignancies. Code status: patient/family wishes for Full Code at this time. PHYSICAL EXAM:  Vitals:  BP (!) 145/92   Pulse 95   Temp 98 °F (36.7 °C) (Tympanic)   Resp 16   Ht 5' 6\" (1.676 m)   Wt 215 lb 14.4 oz (97.9 kg)   SpO2 96%   BMI 34.85 kg/m²     General: awake, alert, cooperative, well nourished and well groomed  HEENT: PERRLA, Mucosa Pink, Moist, EMOI, External nose normal, Normocephalic, Atraumatic and Neck with full ROM  Neck: Supple, Carotid Pulses Present, No Bruits, No Masses, Tenderness, Nodularity and No Lymphadenopathy  Chest/Lungs: Short of breath with activity, no conversational dyspnea noted., Bases Diminished Bilaterally and Distant Breath Sounds  Cardiac: Irregularly Irregular and Pedal Pulses Palpable Bilaterally  GI/Abdomen:  Bowel Sounds Present, Soft, Non-tender, without Guarding or Rebound Tenderness, No Masses and No Tenderness  : Not examined  Extremities/Musculoskeletal: Edema up to sacrum bilaterally, 3-4+ pitting edema BLE. and BLE with edema  Skin: Skin warm and dry  Neuro: Alert and Oriented, to Person, to Time, to Place, to Situation, No Localizing Signs/Symptoms, follows commands with all 4 extremities and Strength equal bilaterally  Psychiatric: Normal mood and affect      LABS:    CBC with Differential:    Lab Results   Component Value Date    WBC 7.8 02/08/2022    RBC 4.96 02/08/2022    HGB 13.9 02/08/2022    HCT 44.4 02/08/2022     02/08/2022    MCV 89.5 02/08/2022    MCH 28.0 02/08/2022    MCHC 31.3 02/08/2022    RDW 18.2 02/08/2022    LYMPHOPCT 8 02/08/2022    MONOPCT 9 02/08/2022    BASOPCT 1 02/08/2022    MONOSABS 0.72 02/08/2022    LYMPHSABS 0.60 02/08/2022    EOSABS <0.03 02/08/2022    BASOSABS 0.04 02/08/2022    DIFFTYPE NOT REPORTED 02/08/2022     CMP:    Lab Results   Component Value Date     02/08/2022    K 4.4 02/08/2022  02/08/2022    CO2 27 02/08/2022    BUN 16 02/08/2022    CREATININE 0.60 02/08/2022    GFRAA >60 02/08/2022    LABGLOM >60 02/08/2022    GLUCOSE 109 02/08/2022    PROT 6.6 02/08/2022    LABALBU 3.8 02/08/2022    CALCIUM 9.4 02/08/2022    BILITOT 2.88 02/08/2022    ALKPHOS 104 02/08/2022    AST 33 02/08/2022    ALT 16 02/08/2022     Hepatic Function Panel:    Lab Results   Component Value Date    ALKPHOS 104 02/08/2022    ALT 16 02/08/2022    AST 33 02/08/2022    PROT 6.6 02/08/2022    BILITOT 2.88 02/08/2022    LABALBU 3.8 02/08/2022     Pro-BNP 2825, Troponin HS 22. CXR:   Impression   Cardiomegaly and chronic pulmonary change with moderate right-sided effusion. EKG:   Atrial fibrillation   Low voltage in limb leads. - Anteroseptal -lateral infarct  Old.    -  Nonspecific T-abnormality.      ASSESSMENT:      Patient Active Problem List   Diagnosis    CHF (congestive heart failure), NYHA class I, acute on chronic, combined ([facility]    Unspecified atrial fibrillation (HCC)    Pleural effusion, not elsewhere classified     Patient rjlu-xoiwphgpcr-ifyapubd-available records reviewed, including, but not limited to,  ER reports--labs--imaging----EKGs--2D ECHO----office records---personal notes    Christal Iniguez.  86 WF   [reema Parkinson NP---IM]  FULL CODE    LOVENOX   SUPPLEMENTAL OXYGEN----2.8.2022  COVID19----NEGATIVE---2.8.2022----MODERNA---1.22.20222.19.2022    Anti-infectives:       Atrial fibrillation---newly diagnosed----2.8.2022  CHF---acuteonlikely chronic listed as combined---systolic---diastolic          2D EKRM----9.8.7116---EL moderately dilated----mild LVH---NLVSF---RA severely dilated---                              RV dilatation---reduce RVSFMAC---moderate MR---moderate-to-severe TR---                             RVSP ~ 38 mm Hg---LVEF ~ 50%           Troponin---2225---25  ASCVD           EKG----2.8.2022---atrial fibrillation---105---low voltage---old

## 2022-02-09 NOTE — PLAN OF CARE
Problem: Falls - Risk of:  Goal: Will remain free from falls  Description: Will remain free from falls  2/9/2022 1201 by Evans Mancia RN  Outcome: Ongoing  2/8/2022 2255 by Thomas Vance RN  Outcome: Ongoing  Goal: Absence of physical injury  Description: Absence of physical injury  2/9/2022 1201 by Evans Mancia RN  Outcome: Ongoing  2/8/2022 2255 by Thomas Vance RN  Outcome: Ongoing     Problem: OXYGENATION/RESPIRATORY FUNCTION  Goal: Patient will maintain patent airway  Outcome: Ongoing  Goal: Patient will achieve/maintain normal respiratory rate/effort  Description: Respiratory rate and effort will be within normal limits for the patient  Outcome: Ongoing     Problem: HEMODYNAMIC STATUS  Goal: Patient has stable vital signs and fluid balance  Outcome: Ongoing     Problem: FLUID AND ELECTROLYTE IMBALANCE  Goal: Fluid and electrolyte balance are achieved/maintained  Outcome: Ongoing     Problem: ACTIVITY INTOLERANCE/IMPAIRED MOBILITY  Goal: Mobility/activity is maintained at optimum level for patient  Outcome: Ongoing     Problem: Discharge Planning:  Goal: Patients continuum of care needs are met  Description: Patients continuum of care needs are met  2/9/2022 1201 by Evans Mancia RN  Outcome: Ongoing  2/9/2022 0947 by Al Andrade RN  Outcome: Met This Shift

## 2022-02-09 NOTE — CONSULTS
[facility] Cardiology Cardiology    Consult                        Today's Date: 2/9/2022  Patient Name: Clemencia Razo  Date of admission: 2/8/2022  4:14 PM  Patient's age: 81 y.o., 1935  Admission Dx: Unspecified atrial fibrillation [I48.91]  Pleural effusion, not elsewhere classified [J90]  Other ascites [R18.8]  Heart failure, unspecified [I50.9]  CHF (congestive heart failure), NYHA class I, acute on chronic, combined ([facility] [I50.43]    Reason for Consult:  Cardiac evaluation    Requesting Physician: Osmany Curry    CHIEF COMPLAINT:  Edema, dyspnea    History Obtained From:  patient, electronic medical record    HISTORY OF PRESENT ILLNESS:      The patient is a 81 y.o.  female who presented with dyspnea and leg edema. She denies any chest pain. She denies any syncope or presyncope. Cr 4.1    HS trop 22-25    Pro BNP 2825    TSH 12.88    Chest xray moderate right sided effusion    Past Medical History:   has a past medical history of CHF (congestive heart failure) ([facility]) and Empyema ([facility] Past Surgical History:   has no past surgical history on file. Home Medications:    Prior to Admission medications    Medication Sig Start Date End Date Taking? Authorizing Provider   Multiple Vitamins-Minerals (CENTRUM SILVER PO) Take 1 tablet by mouth daily   Yes Historical Provider, MD   Omega-3 Fatty Acids (FISH OIL PO) Take 1 tablet by mouth daily   Yes Historical Provider, MD       Allergies:  Patient has no known allergies. Social History:   reports that she has never smoked. She has never used smokeless tobacco. She reports previous alcohol use. She reports that she does not use drugs. Family History: family history includes Cancer in her brother. No h/o sudden cardiac death. No for premature CAD    REVIEW OF SYSTEMS:    · Constitutional: there has been no unanticipated weight loss. There's been No change in energy level, No change in activity level.      · Eyes: No visual changes or diplopia. No scleral icterus. · ENT: No Headaches, hearing loss or vertigo. No mouth sores or sore throat. · Cardiovascular: see above  · Respiratory: see above  · Gastrointestinal: No abdominal pain, appetite loss, blood in stools. · Genitourinary: No dysuria, trouble voiding, or hematuria. · Musculoskeletal:  No gait disturbance, No weakness or joint complaints. · Integumentary: No rash or pruritis. · Neurological: No headache or diplopia. No tingling  · Psychiatric: No anxiety, or depression. · Endocrine: No temperature intolerance. · Hematologic/Lymphatic: No abnormal bruising or bleeding, blood clots or swollen lymph nodes. · Allergic/Immunologic: No nasal congestion or hives. PHYSICAL EXAM:      /72   Pulse 85   Temp 98.4 °F (36.9 °C) (Tympanic)   Resp 27   Ht 5' 6\" (1.676 m)   Wt 210 lb 4.8 oz (95.4 kg)   SpO2 93%   BMI 33.94 kg/m²    Constitutional and General Appearance: alert, cooperative, no distress and appears stated age  HEENT: PERRL, no cervical lymphadenopathy. No masses palpable. Normal oral mucosa  Respiratory:  · Normal excursion and expansion without use of accessory muscles  · Resp Auscultation: Good respiratory effort. No for increased work of breathing. On auscultation: clear to auscultation bilaterally  Cardiovascular:  · irregular S1 and S2.  · Jugular venous pulsation Normal  · The carotid upstroke is normal in amplitude and contour without delay or bruit   Abdomen:   · soft  · Bowel sounds present  Extremities:  ·  3+ edema  Neurological:  · Alert and oriented.       DATA:    Diagnostics:    EKG: Atrial fibrillation, anteroseptal infarction    Labs:     CBC:   Recent Labs     02/08/22  1439 02/09/22  0520   WBC 7.8 5.3   HGB 13.9 13.1   HCT 44.4 41.9    179     BMP:   Recent Labs     02/08/22  1439 02/09/22  0520    142   K 4.4 4.1   CO2 27 23   BUN 16 16   CREATININE 0.60 0.63   LABGLOM >60 >60   GLUCOSE 109* 97     BNP: No results for input(s): BNP in the last 72 hours. PT/INR: No results for input(s): PROTIME, INR in the last 72 hours. APTT:No results for input(s): APTT in the last 72 hours. CARDIAC ENZYMES:No results for input(s): CKTOTAL, CKMB, CKMBINDEX, TROPONINI in the last 72 hours. FASTING LIPID PANEL:No results found for: HDL, LDLDIRECT, LDLCALC, TRIG  LIVER PROFILE:  Recent Labs     02/08/22  1439 02/09/22  0520   AST 33* 32*   ALT 16 15   LABALBU 3.8 3.6       IMPRESSION:    Patient Active Problem List   Diagnosis    CHF (congestive heart failure), NYHA class I, acute on chronic, combined ([facility] [address] [facility]    Unspecified atrial fibrillation (HCC)    Pleural effusion, not elsewhere classified     Acute CHF systolic vs diastolic  Right Pleural effusion  Hypothyroidism  Atrial fibrillation  Abnormal ECG showing anteroseptal infarction      RECOMMENDATIONS:  1. Full dose lovenox has been initiated for anticoagulation  2. Continue low dose metoprolol  3. Fluid restriction 1.2L  4. Continue IV lasix 20mg BID  5. ACE wraps  6. Consideration of IR guided thoracocentesis  7. Obtain TTE. If cardiomyopathy noted, patient will need cardiac cath. 8. Add low dose Entresto if cardiomyopathy noted on TTE. 9. Recommend DIANA/CV. Risk and benefits discussed  10. Lexiscan stress test as OP due to abnormal ECG   11. Refer to CHF clinic upon discharge      Discussed with patient and Nurse.     John Thapa 0748 Cardiology Consult           [phone]

## 2022-02-09 NOTE — FLOWSHEET NOTE
rounding on PCU    Assessment: Patient is resting in bed.  entered room with patient's daughter who was coming in for a visit. Patient and daughter are both acquainted with  from [employer] functions. Intervention: Engaged in conversation.  prayed with them and blessed patient. Patient expressed appreciation for visit and offer of continued prayer. Plan: Chaplains are available on site or on call 24/7 for spiritual and emotional support. 02/09/22 1509   Encounter Summary   Services provided to: Patient and family together   Referral/Consult From: [facility] Family members; S[employer]/cayla community   Place of [facility] Completed  (by family)   Continue Visiting   (2/9/22)   Complexity of Encounter Moderate   Length of Encounter 15 minutes   Spiritual/Mandaeism   Type Spiritual support   Assessment Calm; Approachable   Intervention Active listening;Prayer   Outcome Comfort;Expressed gratitude;Engaged in conversation

## 2022-02-09 NOTE — PROGRESS NOTES
DISCHARGE BARRIERS       Reason for Referral:  SW completed a Psychosocial Assessment for evaluation of patient's mental health, social status, and functional capacity within the community. Angela Calvin is a 81 y.o. female admitted due to unspecified atrial fibrillation ([facility] [address] Patient was accompanied by her daughter. SW provided supportive listing while patient dicussed past medical history,  of 6 years, and events leading up to hospitalization. Mental Status:  Alert, oriented, and engaging during assessment. Decision Making:  Makes own decisions. Family/Social/Home Environment: Lives alone in [place] [id number]     Support: Discussed a good social support network     Current Services: None. SW provided education and resources. Current DMEs: Walker and grab bars. PCP: JESSICA Cordoba CNP and kevan no issues affording medication.  status:  None     ADLs and means of transportation: Independent in ADLs and states she can drive however, her children assist he with transportation. Food insecurity or needed financial assistance: Denies any food insecurity or financial concerns at this time. ACP and Code Status:  Angela Calvin is a Full Code status and does not have an Advance Directive. Collaborative List of SNF/ECF/HH were provided: Declined at this time. No discharge order at this time. Anticipated Needs/Discharge Plan:  Spoke with patient/family/representative about discharge plan. Patient/Family/Representative verbalizes understanding of the plan of care and denies discharge needs or further services at this time. SW provided business card. SW will continue to monitor needs and assist as appropriate.         Electronically signed by WILFREDO Mcbride on 2/9/2022 at 2:54 PM

## 2022-02-09 NOTE — PLAN OF CARE
Problem: Discharge Planning:  Goal: Patients continuum of care needs are met  Description: Patients continuum of care needs are met  Outcome: Met This Shift   Business card provided, denies needs at discharge. Voices understanding to notify DC planning if needs arise before discharge. Patient lives at home and is independent. Her son lives in the apartment above her and is able to help when needed.

## 2022-02-10 NOTE — PROGRESS NOTES
Patient admitted, consent signed and questions answered. Patient ready for procedure. Call light to reach with side rails up 2 of 2. Family at bedside with patient. History and physical needs to be completed.

## 2022-02-10 NOTE — PROGRESS NOTES
Received post procedure to [facility] to room 4. Assessment obtained. Restrictions reviewed with patient. Post procedure pathway initiated. Family at side. Patient without complaints.

## 2022-02-10 NOTE — PLAN OF CARE
Problem: Falls - Risk of:  Goal: Will remain free from falls  Description: Will remain free from falls  2/9/2022 1201 by Hilda Meadows RN  Outcome: Ongoing  Goal: Absence of physical injury  Description: Absence of physical injury  2/9/2022 1201 by Hilda Meadows RN  Outcome: Ongoing     Problem: OXYGENATION/RESPIRATORY FUNCTION  Goal: Patient will maintain patent airway  2/9/2022 1201 by Hilda Meadows RN  Outcome: Ongoing  Goal: Patient will achieve/maintain normal respiratory rate/effort  Description: Respiratory rate and effort will be within normal limits for the patient  2/9/2022 1201 by Hilda Meadows RN  Outcome: Ongoing     Problem: HEMODYNAMIC STATUS  Goal: Patient has stable vital signs and fluid balance  2/9/2022 1201 by Hilda Meadows RN  Outcome: Ongoing     Problem: FLUID AND ELECTROLYTE IMBALANCE  Goal: Fluid and electrolyte balance are achieved/maintained  2/9/2022 1201 by Hilda Meadows RN  Outcome: Ongoing     Problem: ACTIVITY INTOLERANCE/IMPAIRED MOBILITY  Goal: Mobility/activity is maintained at optimum level for patient  2/9/2022 1201 by Hilda Meadows RN  Outcome: Ongoing     Problem: Discharge Planning:  Goal: Patients continuum of care needs are met  Description: Patients continuum of care needs are met  2/9/2022 1201 by Hilda Meadows RN  Outcome: Ongoing

## 2022-02-10 NOTE — PROCEDURES
[facility]  Transesophageal Echocardiogram and Cardioversion       Today's Date:  2/10/2022    Indication:   Afib/CV    Operators:  Primary:   Gamal rTiplett MD (Attending Physician)  Assistant:   Bishop Skylar MD (Cardiovascular Fellow)    Pre Procedure Conscious Sedation Data:    ASA Class:    [] I [x] II [] III [] IV    Mallampati Class:  [] I [x] II [] III [] IV    Patient seen and examined. History and Physical reviewed. Labs reviewed. After informed consent was obtained with explanation of the risks and benefits, the patient was brought to Cath lab. All sedation was administered by the cardiologist. The oropharynx was pre anesthetisized with cetacaine spray. DIANA:    Structures:    LA:  Enlarged  ESHA:  No thrombus  RA:  Enlarged  RV:   Normal  LV:  Normal  Estimated LVEF: 50%. LVH  Aorta:   Mild atheromatous disease arch  Pericardium: No pericardial effusion  Septum:  No intracardiac shunt via color Doppler. No intracardiac shunt via injection of agitated saline. Valves:    Mitral Valve:  Structurally normal. Moderate regurgitation is identified. Aortic Valve: The aortic valve is trileaflet and opens adequately. No regurgitiation is identified. Tricuspid valve: Structurally normal. Severe regurgitation is identified. Pulmonary valve: Normal. Mild significant regurgitation    No valvular vegetations or thrombus identified. DIANA Summary:     1. A DIANA was performed without complications. 2. LVEF 50%, LVH, bi-atrial enlargement   3. Severe TR, moderate MR, mild PI  3. No thrombus or valvular vegetation identified  4. Proceed with Cardioversion        CARDIOVERSION:    After an adequate level of sedation was achieved, 200J in biphasic synchronized delivery was administered. Subsequently 360 J in biphasic synchronized delivery was administered with conversion to normal sinus rhythm with PACs. Cardioversion was successful. The patient awoke without complications. Plan    1. Start patient on po amiodarone 200 mg BID  2. Discharge back to [place]. Electronically signed by Hardeep Sanchez MD on 2/10/2022 at [facility]      Attending Physician Statement  I have discussed the case of Crow Johns including pertinent history and exam findings with the resident. I have seen and examined the patient and the key elements of the encounter have been performed by me. I agree with the assessment, plan and orders as documented by the resident With changes made to the note.   I was present during entire procedure and performed all critical elements of the procedure    Electronically signed by Antonieta Hoyt MD on 2/10/2022 at 12:37 PM.    [facility]      [phone]

## 2022-02-11 NOTE — PLAN OF CARE
Problem: Skin Integrity:  Goal: Will show no infection signs and symptoms  Description: Will show no infection signs and symptoms  Outcome: Ongoing  Goal: Absence of new skin breakdown  Description: Absence of new skin breakdown  Outcome: Ongoing     Problem:  Activity:  Goal: Ability to tolerate increased activity will improve  Description: Ability to tolerate increased activity will improve  Outcome: Ongoing  Goal: Expression of feelings of increased energy will increase  Description: Expression of feelings of increased energy will increase  Outcome: Ongoing     Problem: Cardiac:  Goal: Ability to maintain an adequate cardiac output will improve  Description: Ability to maintain an adequate cardiac output will improve  Outcome: Ongoing  Goal: Complications related to the disease process, condition or treatment will be avoided or minimized  Description: Complications related to the disease process, condition or treatment will be avoided or minimized  Outcome: Ongoing     Problem: Coping:  Goal: Level of anxiety will decrease  Description: Level of anxiety will decrease  Outcome: Ongoing  Goal: General experience of comfort will improve  Description: General experience of comfort will improve  Outcome: Ongoing     Problem: Health Behavior:  Goal: Ability to manage health-related needs will improve  Description: Ability to manage health-related needs will improve  Outcome: Ongoing     Problem: Safety:  Goal: Ability to remain free from injury will improve  Description: Ability to remain free from injury will improve  Outcome: Ongoing  Goal: Will show no signs and symptoms of excessive bleeding  Description: Will show no signs and symptoms of excessive bleeding  Outcome: Ongoing

## 2022-02-11 NOTE — FLOWSHEET NOTE
rounding on PCU    Assessment: Patient is dressed and ready for discharge. with son waiting. Intervention: Engaged in conversation.  offered prayer and blessing. Patient expressed appreciation for visit and offer of continued prayer. Plan: Chaplains are available on site or on call 24/7 for spiritual and emotional support.      02/11/22 1356   Encounter Summary   Services provided to: Patient   Referral/Consult From: Rounding   Continue Visiting   (2/11/22)   Complexity of Encounter Low   Length of Encounter 15 minutes   Spiritual/Advent   Type Spiritual support   Assessment Approachable   Intervention Active listening;Prayer   Outcome Expressed gratitude;Engaged in conversation

## 2022-02-11 NOTE — PLAN OF CARE
Problem: Activity:  Goal: Ability to tolerate increased activity will improve  Description: Ability to tolerate increased activity will improve  2/11/2022 0918 by Harish Castillo RN  Outcome: Ongoing  2/11/2022 0316 by Fatmata Chou RN  Outcome: Ongoing     Problem:  Activity:  Goal: Expression of feelings of increased energy will increase  Description: Expression of feelings of increased energy will increase  2/11/2022 0918 by Harish Castillo RN  Outcome: Ongoing  2/11/2022 0316 by Fatmata Chou RN  Outcome: Ongoing     Problem: Cardiac:  Goal: Ability to maintain an adequate cardiac output will improve  Description: Ability to maintain an adequate cardiac output will improve  2/11/2022 0918 by Harish Castillo RN  Outcome: Ongoing  2/11/2022 0316 by Fatmata Chou RN  Outcome: Ongoing     Problem: Safety:  Goal: Ability to remain free from injury will improve  Description: Ability to remain free from injury will improve  2/11/2022 0918 by Harish Castillo RN  Outcome: Ongoing  2/11/2022 0316 by Fatmata Chou RN  Outcome: Ongoing

## 2022-02-11 NOTE — DISCHARGE INSTR - DIET
Good nutrition is important when healing from an illness, injury, or surgery. Follow any nutrition recommendations given to you during your hospital stay. If you were given an oral nutrition supplement while in the hospital, continue to take this supplement at home. You can take it with meals, in-between meals, and/or before bedtime. These supplements can be purchased at most local grocery stores, pharmacies, and chain [employer]-stores. If you have any questions about your diet or nutrition, call the hospital and ask for the dietitian.   Cardiac diet 1200 ml fluid restriction

## 2022-02-11 NOTE — PROGRESS NOTES
Hospitalist Progress Note    Patient:  Sarah Davila     YOB: 1935    MRN: [id number]   Admit date: 2/8/2022     Acct: [de-identified]     PCP: JESSICA Pabon - CNP    CC--Interval History: Atrial fibrillation RVR----DIANA and successful cardioversion---[facility]----returned to [facility] following the procedures. Patient was placed on Amiodarone 200 BID    Patient back atrial fibrillation---2.11.2022----controlled rate    Home----2.11.2022 on amiodarone 200 mg BID and to see Cardiology in the office     CHF---acute-on-chronic combined stable    See note below     All other ROS negative except noted in HPI    Diet:  ADULT DIET;  Regular; Low Fat/Low Chol/High Fiber/2 gm Na; 1200 ml    Medications:  Scheduled Meds:   amiodarone  200 mg Oral BID    multivitamin  1 tablet Oral Daily    levothyroxine  25 mcg Oral Daily    miconazole   Topical BID    sodium chloride flush  5-40 mL IntraVENous 2 times per day    enoxaparin  1 mg/kg SubCUTAneous BID    metoprolol tartrate  12.5 mg Oral BID    furosemide  20 mg IntraVENous BID     Continuous Infusions:   sodium chloride       PRN Meds:sodium chloride flush, sodium chloride, ondansetron **OR** ondansetron, acetaminophen **OR** acetaminophen    Objective:  Labs:  CBC with Differential:    Lab Results   Component Value Date    WBC 5.5 02/11/2022    RBC 4.65 02/11/2022    HGB 13.4 02/11/2022    HCT 41.4 02/11/2022     02/11/2022    MCV 89.0 02/11/2022    MCH 28.8 02/11/2022    MCHC 32.4 02/11/2022    RDW 18.3 02/11/2022    LYMPHOPCT 15 02/11/2022    MONOPCT 13 02/11/2022    BASOPCT 1 02/11/2022    MONOSABS 0.70 02/11/2022    LYMPHSABS 0.80 02/11/2022    EOSABS 0.05 02/11/2022    BASOSABS 0.03 02/11/2022    DIFFTYPE NOT REPORTED 02/11/2022     BMP:    Lab Results   Component Value Date     02/11/2022    K 4.0 02/11/2022     02/11/2022    CO2 25 02/11/2022    BUN 17 02/11/2022    LABALBU 3.6 02/09/2022    CREATININE 0.76 02/11/2022    CALCIUM 9.1 02/11/2022    GFRAA >60 02/11/2022    LABGLOM >60 02/11/2022    GLUCOSE 112 02/11/2022           Physical Exam:  Vitals: /86   Pulse 84   Temp 98.3 °F (36.8 °C) (Tympanic)   Resp 23   Ht 5' 6\" (1.676 m)   Wt 210 lb 4.8 oz (95.4 kg)   SpO2 95%   BMI 33.94 kg/m²   24 hour intake/output:    Intake/Output Summary (Last 24 hours) at 2/11/2022 1330  Last data filed at 2/11/2022 0835  Gross per 24 hour   Intake 120 ml   Output 600 ml   Net -480 ml     Last 3 weights: Wt Readings from Last 3 Encounters:   02/09/22 210 lb 4.8 oz (95.4 kg)   02/08/22 212 lb (96.2 kg)   04/07/20 170 lb (77.1 kg)     HEENT: Normocephalic and Atraumatic  Neck: Supple, No Masses, Tenderness, Nodularity and No Lymphadenopathy  Chest/Lungs: Clear to Auscultation without Rales, Rhonchi, or Wheezes  Cardiac: Irregularly Irregular  GI/Abdomen: Bowel Sounds Present and Soft, Non-tender, without Guarding or Rebound Tenderness  : Not examined  EXT/Skin: No Cyanosis, No Clubbing and Edema Present---trivial  Neuro: Alert and Oriented and No Localizing Signs/Symptoms      Assessment:    Principal Problem:    Unspecified atrial fibrillation (HCC)  Active Problems:    CHF (congestive heart failure), NYHA class I, acute on chronic, combined (HCC)    Pleural effusion, not elsewhere classified  Resolved Problems:    * No resolved hospital problems.  *    Jose Roth.  81 WF   [reema Parkinson NP---IM]  FULL CODE    LOVENOX   SUPPLEMENTAL OXYGEN----2.8.2022      CARDIZEM gtt2.10.2022dcd  COVID19----NEGATIVE---2.8.2022----MODERNA---1.22.20222.19.2022   AMIODARONE    Anti-infectives:       Atrial fibrillation recurrent---2.11.2022          EKG----2.11.2022---atrial fibrillation---76---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  POD   ______  DIANAsuccessful cardioversion----2.10.2022    Atrial fibrillation---newly diagnosed----2.8.2022  CHF---acuteonlikely chronic listed as combined---systolic---diastolic          2D ECHO----2.9.2022---LA moderately dilated----mild LVH---NLVSF---RA severely dilated---                              RV dilatation---reduce RVSFMAC---moderate MR---moderate-to-severe TR---                             RVSP ~ 38 mm Hg---LVEF ~ 50%           Troponin---2225---25  ASCVD           EKG----2.8.2022---atrial fibrillation---105---low voltage---old anteroseptallateral infarction  BLE---pitting edema  Pleural effusion   PMH:     empyema--1982  PSH:      bronchoscopy---thoracostomy tube---1982    Allergies:     NKDA         Plan:  1. Home----2.11.2022  2. Medications reviewed  3. New medication----Amiodarone 200 mg PO BID  4. Follow up Sukhdeep Appiah NP---IM  5.     Follow up DC Cardiology---[facility]  6.    See orders    Electronically signed by Nemo Pearce on 2/11/2022 at 1:30 PM    Hospitalist

## 2022-02-12 NOTE — TELEPHONE ENCOUNTER
please make sure she has a hospital follow up scheduled and also cardio appt schedule- it is noted that pt had significant amount of swelling prior to hospitalization- no dieretics ordered at DC. please see if cardio agrees with starting her on PO lasix

## 2022-02-12 NOTE — DISCHARGE SUMMARY
[address]                 [address] [place] [address], [address] [place] [address]                               DISCHARGE SUMMARY    PATIENT NAME: Tima Romero                   :        1935  MED REC NO:   [id number]                             ROOM:       6398  ACCOUNT NO:   [de-identified]                           ADMIT DATE: 2022  PROVIDER:     Stuart Borjas. Cande Wylie MD                  DISCHARGE DATE:  2022    ATTENDING PHYSICIAN OF HOSPITALIZATION:  Noe Vega MD    PERSONAL CARE PROVIDER:  Teresa Tena, nurse practitioner,  Internal Medicine. The patient seen by [facility], [facility]. DIAGNOSES:  1. Atrial fibrillation, recurrent 2022.  2.  Atrial fibrillation, newly diagnosed 2022. Status post DIANA  and successful cardioversion 02/10/2022, with subsequent recurrence on  2022. EKG, 2022, atrial fibrillation, rate 68, old septal  infarction previously seen. 3.  Congestive heart failure, acute on likely chronic, listed as  combined systolic diastolic with a 2D echo , LA moderately  dilated, mild LVH, normal left ventricular systolic function, RA  severely dilated, RV dilatation, reduced right ventricular systolic  function, MAC, moderate MR, moderate-to-severe TR, RVSP 38 mmHg, LVEF  approximately 50%. MI was ruled out. Troponins 22, 25, 25.  4.  Coronary artery disease. EKG, 2022, atrial fibrillation, rate  105, low voltage, old anteroseptal infarction, lateral infarction. 5.  Bilateral lower extremity pitting edema. 5.  Pleural effusion. Other medical problems set forth in the progress note 2022,  incorporated for reference herein. HISTORY OF PRESENT ILLNESS AND HOSPITAL COURSE:  The patient is an  73-year-old white female, patient of Reza Vargas, Internal  Medicine, [facility].       The patient presented with an  irregular heart rhythm, subsequently diagnosed as being in atrial  fibrillation. This was newly diagnosed on 02/08/2022, likely has been a  chronic event. The patient underwent a DIANA cardioversion successfully  on 02/10/2022. Unfortunately, went back into atrial fibrillation on  02/11/2022, at that time the patient was on amiodarone. Has underlying congestive heart failure and coronary artery disease as  set forth above. Also noted to have a pitting edema to the back, this  has improved during the hospitalization but still has a ways to go. The patient was placed on amiodarone 200 mg twice a day and this was  continued at the time of discharge. The patient to be seen in the outpatient setting by Cardiology for  further evaluation and treatment of the patient's atrial fibrillation. LABORATORY DATA:  Around the time of discharge, white cell count 5.5,  hemoglobin 13.4, hematocrit 41.4, platelets 368,003. Sodium 141,  potassium 4.0, chloride 104, CO2 of 25, BUN 7, creatinine 0.76, glucose  112, calcium 9.1, GFR was greater than 60. The patient's chest x-ray of 02/11/2022, improvement of venous  hypertension, cardiomegaly, COPD, pleural effusions more so on the  right, slight elevation of the right hemidiaphragm. EKG of 02/11/2022,  atrial fibrillation, 68, 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 as set forth above. DISCHARGE INSTRUCTIONS/FOLLOWUP:  Discharged to home on 02/11/2022. DIET:  Cardiac diet. ACTIVITY:  As tolerated. CONDITION AT DISCHARGE:  Fair, improved. MEDICATIONS:  NEW:  Amiodarone 200 mg p.o. twice daily; levothyroxine (Synthroid) 25  mcg (0.025 mg) p.o. daily, the patient having had an elevated TSH;  Lopressor 12.5 mg p.o. b.i.d.; dermatitis under breasts, miconazole  (Micatin) 2% powder twice daily. FOLLOWING MEDICATIONS CONTINUED IN THE OUTPATIENT SETTING WITH THE  PATIENT'S OWN VOLITION:  Centrum Silver one p.o. daily and fish oil OTC  one p.o. daily.     Follow up with the patient's personal care provider, Carlos Rivas, nurse practitioner, Internal Medicine. Follow up with [facility], [facility]. Any aspect of the patient's care not discussed in the chart and/or  dictation will be addressed and treated as an outpatient. The patient's medications have been reviewed including, but not limited  to, pre-hospital, hospital and discharge medications. The patient  and/or the patient's personal representatives were specifically advised  the only medications to be taken are those set forth in the discharge  orders and no other medications should be taken. Any prior medications  not on the discharge orders are specifically discontinued.         Delio Fernandez MD    D: 02/11/2022 21:55:36       T: 02/11/2022 21:59:10     [email or web address]  Job#: [id number]     Doc#: [id number]    CC:

## 2022-02-14 NOTE — TELEPHONE ENCOUNTER
[facility] Transitions Initial Follow Up Call    Outreach made within 2 business days of discharge: Yes    Patient: Hemant Jones Patient : 1935   MRN: [id number]  Reason for Admission: Atrial Fibrillation, CHF, Edema, Pleural effusion    Discharge Date: 22       Spoke with: patient    Discharge department/facility: [address] [place] [address] Interactive Patient Contact:  Was patient able to fill all prescriptions: Yes  Was patient instructed to bring all medications to the follow-up visit: Yes  Is patient taking all medications as directed in the discharge summary? Yes  Does patient understand their discharge instructions: Yes  Does patient have questions or concerns that need addressed prior to 7-14 day follow up office visit: no    Scheduled appointment with PCP within 7-14 days  Follow Up  Future Appointments   Date Time Provider Rolando Llamas   2022  2:00 PM [facility]     [place] [address] [place] to schedule post hospital follow up. Note also forwarded to cardio per PCP request regarding starting oral diuretic. Patient reports continual improvement in swelling and dyspnea since discharge.      Katey Landaverde LPN

## 2022-02-19 NOTE — PATIENT INSTRUCTIONS
Patient Education        Cellulitis: Care Instructions  Your Care Instructions     Cellulitis is a skin infection caused by bacteria, most often strep or staph. It often occurs after a break in the skin from a scrape, cut, bite, or puncture, or after a rash. Cellulitis may be treated without doing tests to find out what caused it. But your doctor may do tests, if needed, to look for a specific bacteria, like methicillin-resistant Staphylococcus aureus (MRSA). The doctor has checked you carefully, but problems can develop later. If you notice any problems or new symptoms, get medical treatment right away. Follow-up care is a key part of your treatment and safety. Be sure to make and go to all appointments, and call your doctor if you are having problems. It's also a good idea to know your test results and keep a list of the medicines you take. How can you care for yourself at home? · Take your antibiotics as directed. Do not stop taking them just because you feel better. You need to take the full course of antibiotics. · Prop up the infected area on pillows to reduce pain and swelling. Try to keep the area above the level of your heart as often as you can. · If your doctor told you how to care for your wound, follow your doctor's instructions. If you did not get instructions, follow this general advice:  ? Wash the wound with clean water 2 times a day. Don't use hydrogen peroxide or alcohol, which can slow healing. ? You may cover the wound with a thin layer of petroleum jelly, such as Vaseline, and a nonstick bandage. ? Apply more petroleum jelly and replace the bandage as needed. · Be safe with medicines. Take pain medicines exactly as directed. ? If the doctor gave you a prescription medicine for pain, take it as prescribed. ? If you are not taking a prescription pain medicine, ask your doctor if you can take an over-the-counter medicine.   To prevent cellulitis in the future  · Try to prevent cuts, scrapes, or other injuries to your skin. Cellulitis most often occurs where there is a break in the skin. · If you get a scrape, cut, mild burn, or bite, wash the wound with clean water as soon as you can to help avoid infection. Don't use hydrogen peroxide or alcohol, which can slow healing. · If you have swelling in your legs (edema), support stockings and good skin care may help prevent leg sores and cellulitis. · Take care of your feet, especially if you have diabetes or other conditions that increase the risk of infection. Wear shoes and socks. Do not go barefoot. If you have athlete's foot or other skin problems on your feet, talk to your doctor about how to treat them. When should you call for help? Call your doctor now or seek immediate medical care if:    · You have signs that your infection is getting worse, such as:  ? Increased pain, swelling, warmth, or redness. ? Red streaks leading from the area. ? Pus draining from the area. ? A fever.     · You get a rash. Watch closely for changes in your health, and be sure to contact your doctor if:    · You do not get better as expected. Where can you learn more? Go to [email or web address] and sign in to your [facility] account. Enter O803 in the [facility] box to learn more about \"Cellulitis: Care Instructions. \"     If you do not have an account, please click on the \"Sign Up Now\" link. Current as of: March 3, 2021               Content Version: 13.1  © 2006-2021 [employer]wise[employer] Inc[employer]. Care instructions adapted under license by [facility] ([facility]). If you have questions about a medical condition or this instruction, always ask your healthcare professional. [address] any warranty or liability for your use of this information. We will contact you when the results of your culture are available. This usually takes at least 48 hours.     Telfa is a type of dressing that does not stick to the skin.

## 2022-02-19 NOTE — PROGRESS NOTES
chronic, combined ([facility]    Unspecified atrial fibrillation ([facility]    Pleural effusion, not elsewhere classified        Current medications are:  Current Outpatient Medications   Medication Sig Dispense Refill    furosemide (LASIX) 20 MG tablet Take 1 tablet by mouth daily 30 tablet 5    potassium chloride (KLOR-CON M) 10 MEQ extended release tablet Take 1 tablet by mouth daily 30 tablet 5    amiodarone (CORDARONE) 200 MG tablet Take 1 tablet by mouth 2 times daily for 30 doses 30 tablet 0    metoprolol tartrate (LOPRESSOR) 25 MG tablet Take 0.5 tablets by mouth 2 times daily 60 tablet 3    miconazole (MICOTIN) 2 % powder Apply topically 2 times daily to under breasts 45 g 1    levothyroxine (SYNTHROID) 25 MCG tablet Take 1 tablet by mouth Daily 30 tablet 3    Multiple Vitamins-Minerals (CENTRUM SILVER PO) Take 1 tablet by mouth daily      Omega-3 Fatty Acids (FISH OIL PO) Take 1 tablet by mouth daily       No current facility-administered medications for this visit. She has No Known Allergies. She  reports that she has never smoked. She has never used smokeless tobacco.      Objective:    Vitals:    02/19/22 1402   BP: 118/72   Site: Left Upper Arm   Position: Sitting   Cuff Size: Medium Adult   Pulse: 86   Resp: 16   Temp: 97.2 °F (36.2 °C)   TempSrc: Tympanic   SpO2: 96%   Weight: 200 lb 8 oz (90.9 kg)   Height: 5' 5\" (1.651 m)     Body mass index is 33.36 kg/m². Wt Readings from Last 6 Encounters:   02/19/22 200 lb 8 oz (90.9 kg)   02/09/22 210 lb 4.8 oz (95.4 kg)   02/08/22 212 lb (96.2 kg)   04/07/20 170 lb (77.1 kg)        Obese elderly female healthy-appearing, alert, cooperative and in no acute distress. There is 2+ edema of the lower extremities bilaterally. There is a linear ulcer on the left anterior lower leg. There is erythema surrounding the ulcer. There  wound has a copious amount of pale yellow fluid. A swab of the fluid was obtained and was sent for culture.       (Please note that portions of this note were completed with a voice-recognition program. Efforts were made to edit the dictation but occasionally words are mis-transcribed.)

## 2022-02-24 NOTE — PROGRESS NOTES
Transition Care Office Visit    Date of Face-to-Face: 2/24/2022  Persons at visit: Daughter and son  Date of interactive contact/ attempted contact with the patient and/or caregiver: 2/14/2022-See transitional care telephone note. HPI     81-year-old patient that was new to me to establish on 2/8/2022 noted to be in A. fib and CHF admitted directly to the hospital.  Prior to appointment with me had not been seen for 40+ years per patient. Patient underwent a DIANA cardioversion successfully on 2/10/2022 however the next day patient went back into atrial fib. Was placed on amiodarone 200 twice daily by cardiology no anticoagulation as family had declined and was diuresed with IV Lasix. Patient was not initially discharged home on oral Lasix per cardiology agreed to add oral Lasix. Patient did have a visit to urgent care for concern of cellulitis to the right lower extremity where her sock was rubbing. Was placed on doxycycline. Patient was seen by cardiology prior to my appointment her Lasix was increased to 40 mg daily and to follow-up in 4 to 6 weeks. Also noted during the hospitalization was elevated TSH patient was placed on levothyroxine 25 mics daily. Son with her today who is a previous EMT states he feels that she is improving continues with significant edema to lower extremities however improved from prior contact with patient she states her shortness of breath is improving. Denies any chest discomfort. Activity: activity as tolerated  Any medication changes since post-hospitalization phone call? Yes Lasix was increased today by cardiology. Please see above patient also was started on low doses of Eliquis also at the request of the family not to have Eliquis 5 mg any   treatment changes since post-hospitalization phone call? Yes was seen in urgent care started on doxycycline for cellulitis to right lower extremity  Any further education needed on medications/treatment plan?  No    Current Medications   Outpatient Medications Marked as Taking for the 2/24/22 encounter (Office Visit) with JESSICA Almaraz CNP   Medication Sig Dispense Refill    metoprolol tartrate (LOPRESSOR) 25 MG tablet Take 1 tablet by mouth 2 times daily 60 tablet 3    furosemide (LASIX) 40 MG tablet Take 1 tablet by mouth daily 30 tablet 3    apixaban (ELIQUIS) 2.5 MG TABS tablet Take 1 tablet by mouth 2 times daily 180 tablet 1    doxycycline hyclate (VIBRA-TABS) 100 MG tablet Take 1 tablet by mouth 2 times daily for 10 days 20 tablet 0    potassium chloride (KLOR-CON M) 10 MEQ extended release tablet Take 1 tablet by mouth daily 30 tablet 5    miconazole (MICOTIN) 2 % powder Apply topically 2 times daily to under breasts 45 g 1    levothyroxine (SYNTHROID) 25 MCG tablet Take 1 tablet by mouth Daily 30 tablet 3    Multiple Vitamins-Minerals (CENTRUM SILVER PO) Take 1 tablet by mouth daily      Omega-3 Fatty Acids (FISH OIL PO) Take 1 tablet by mouth daily         Medication Reconciliation  Provider has reviewed medication record and it has been modified as necessary to accurately depict current medications since hospitalization. Naldo Castro has been instructed on these changes. Social History     Socioeconomic History    Marital status:       Spouse name: None    Number of children: None    Years of education: None    Highest education level: None   Occupational History    None   Tobacco Use    Smoking status: Never Smoker    Smokeless tobacco: Never Used   Substance and Sexual Activity    Alcohol use: Not Currently    Drug use: Never    Sexual activity: None   Other Topics Concern    None   Social History Narrative    None     Social Determinants of Health     Financial Resource Strain:     Difficulty of Paying Living Expenses: Not on file   Food Insecurity:     Worried About Running Out of Food in the Last Year: Not on file    Samy of Food in the Last Year: Not on file Transportation Needs:     Lack of Transportation (Medical): Not on file    Lack of Transportation (Non-Medical): Not on file   Physical Activity:     Days of Exercise per Week: Not on file    Minutes of Exercise per Session: Not on file   Stress:     Feeling of Stress : Not on file   Social Connections:     Frequency of Communication with Friends and Family: Not on file    Frequency of Social Gatherings with Friends and Family: Not on file    Attends Yazidi Services: Not on file    Active Member of [address] [place] [address] or Organizations: Not on file    Attends Club or Organization Meetings: Not on file    Marital Status: Not on file   Intimate Partner Violence:     Fear of Current or Ex-Partner: Not on file    Emotionally Abused: Not on file    Physically Abused: Not on file    Sexually Abused: Not on file   Housing Stability:     Unable to Pay for Housing in the Last Year: Not on file    Number of [place] in the Last Year: Not on file    Unstable Housing in the Last Year: Not on file       Past Medical History:   Diagnosis Date    [facility]     CAD (coronary artery disease)     CHF (congestive heart failure) (HCC)     Edema     Emp[facility]ema ([facility]     Pleural effusion        Family History   Problem Relation Age of Onset    Cancer Brother        Updated and reviewed pertinent PSFH    No Known Allergies    ROS   General: Denies fever, chills, night sweats, or recent weight changes. Skin: Denies any rashes/wounds. HEENT: Denies any headaches. Denies any blurred vision, double vision, or eye pain. Denies any tinnitus, vertigo, or dizziness. Denies discharge, stuffiness, obstruction, or epistaxis. Denies any hoarseness, dysphagia/ pain. Cardiovascular: Denies any chest pain, shortness of breath, positive mild dyspnea on exertion, declines orthopnea, or palpations. Respiratory: Denies cough, sputum production, hemoptysis, or wheezing.   Gastrointestinal: Denies any nausea, vomiting, diarrhea, constipation, or melena. Genitourinary: Denies any dysuria, hematuria, frequency, urgency, or hesitancy. Endocrine:  Denies any heat or cold intolerances, polydipsia, polyuria, or polyphagia. Musculoskeletal: Positive arthralgias  Neuropsychiatric: Denies any depression, syncope, tremors, seizures, anxiety or nervousness. Physical Exam:  Vitals /74 (Site: Left Upper Arm, Position: Sitting, Cuff Size: Large Adult)   Pulse 80   Temp 97.1 °F (36.2 °C) (Temporal)   Resp 16   Ht 5' 5\" (1.651 m)   Wt 194 lb (88 kg)   SpO2 94%   BMI 32.28 kg/m²   General Appearance: Alert, no distress; vital signs were reviewed today  Head: Normocephalic, atraumatic. Eyes:  Sclera clear, no drainage  Ears:  External auditory canals patent, no drainage  Nose:  Septum midline, mucosa normal, no drainage or sinus tenderness  Throat: pharynx moist and pink, no exudate  Neck: Supple, trachea midline, no adenopathy; Thyroid: No enlargement/tenderness/nodules;  Back: Symmetric, no tenderness  Lungs: Chest rises and falls symmetrically, no use of accessory muscles, Lungs clear throughout  Chest wall: No tenderness  Heart[de-identified] Regular rate and rhythm, S1 and S2 normal, no murmur or gallop  Abdomen: Nontender, bowel sounds active in all 4 quadrants, no masses  Extremities: Extremities without clubbing,cyanosis. Significant +3 edema to thighs  Skin: Skin color normal, no rashes or lesions  Neurological: Cranial nerves II-XII appears grossly intact- no focal deficit  Psychiatric: mood and affect within normal limits    Assessment/Plan    1. CHF (congestive heart failure), NYHA class I, acute on chronic, combined (HCC)  Continue on metoprolol, Lasix. Recent increase in Lasix. Will need follow-up labs in the next week or 2.  - Basic Metabolic Panel; Future  - Comprehensive Metabolic Panel; Future  - TSH; Future    2. Persistent atrial fibrillation (HCC)  Amiodarone was discontinued by cardiology today.   Was

## 2022-02-24 NOTE — PROGRESS NOTES
Cardiology Consultation/Follow Up. [facility]    CC: Patient is here for posthospitalization follow-up    HPI  Joni Zheng is 81-year-old female recently admitted to hospital with acute congestive heart failure. Found to have atrial fibrillation with RVR. Underwent DIANA guided cardioversion. Converted back to A. fib next day. Discharged home on amiodarone 200 mg twice daily. No anticoagulation started on discharge. LVEF 50%. Moderate MR and severe TR noted on DIANA. Today, she reports that she is feeling somewhat better. Still remains dyspneic on exertion. Only walking short distances at home. Bilateral lower extremity edema improving but persist.  Has been following with wound clinic for right lower extremity superficial ulcers. No fever or chills. Remains in atrial fibrillation today with good rate control  Denies chest pain or palpitation    Past Medical:  Past Medical History:   Diagnosis Date    CHF (congestive heart failure) (HCC)     Empyema (HCC)        Past Surgical:  No past surgical history on file. Family History:  Family History   Problem Relation Age of Onset    Cancer Brother        Social History:  Social History     Tobacco Use    Smoking status: Never Smoker    Smokeless tobacco: Never Used   Substance Use Topics    Alcohol use: Not Currently    Drug use: Never        REVIEW OF SYSTEMS:    · Constitutional: there has been decline in functional status since earlier this month  · Eyes: No visual changes or diplopia. No scleral icterus. · ENT: No Headaches, hearing loss or vertigo. No mouth sores or sore throat. · Cardiovascular: As described in HPI. · Respiratory: AS HPI  · Gastrointestinal: No abdominal pain, appetite loss, blood in stools. No change in bowel or bladder habits. · Genitourinary: No dysuria, trouble voiding, or hematuria.   · Musculoskeletal: Positive for bilateral lower extremity edema and pain  · Integumentary: Superficial ulcers on stasis dermatitis  Neurologic: Mental status: Alert, oriented, thought content appropriate    Labs:  CBC: No results for input(s): WBC, HGB, HCT, PLT in the last 72 hours. BMP: No results for input(s): NA, K, CO2, BUN, CREATININE, LABGLOM, GLUCOSE in the last 72 hours. PT/INR: No results for input(s): PROTIME, INR in the last 72 hours. FASTING LIPID PANEL:  Lab Results   Component Value Date    HDL 51 02/09/2022    TRIG 62 02/09/2022     LIVER PROFILE:No results for input(s): AST, ALT, LABALBU in the last 72 hours. EKG 2/11/2022: Atrial fibrillation, septal infarct      DIANA 2/9/2022  1. A DIANA was performed without complications. 2. LVEF 50%, LVH, bi-atrial enlargement   3. Severe TR, moderate MR, mild PI  3. No thrombus or valvular vegetation identified  4. Patient successfully cardioverted after 2 shocks. Past Medical and Surgical History, Problem List, Allergies, Medications, Labs, Imaging, all reviewed extensively in EMR and with the patient. Assessment:   1. Acute diastolic congestive heart failure  2. Atrial fibrillation, new onset, s/p DIANA/cardioversion 2/10/22, converted back to A-fib on 2/11/22    3. Preserved LVEF  4. Moderate mitral regurgitation  5. Severe tricuspid regurgitation  6. Biatrial enlargement  7. Hypothyroidism      Plan:    Increase Lasix to 40 mg daily due to persistent lower extremity edema/dyspnea   Discontinue amiodarone as she has been in persistent A. Fib   Increase Lopressor to 25 mg twice daily   Recommend therapeutic anticoagulation given high Maryt vasc score. Risks and benefits of long-term anticoagulation including potential bleeding discussed with patient and her family in detail. They would like to start the lowest possible dose. We will start Eliquis 2.5 mg twice daily and see if she tolerates.     Repeat evaluation in 6 weeks or earlier if needed   Continue follow-up with wound   Might need stress test in near future due to abnormal ECG      The patient was counseled regarding heart healthy diet, weight loss and exercise as tolerated. Patient's medications and side effects were discussed. All questions and concerns were addressed to patient's satisfaction. Thank you for allowing me to participate in the care of this patient, please do not hesitate to call if you have any questions. Padmaja Lincoln MD, [facility]  [facility]  [phone]

## 2022-02-25 PROBLEM — I48.19 ATRIAL FIBRILLATION, PERSISTENT (HCC): Status: ACTIVE | Noted: 2022-01-01

## 2022-02-25 PROBLEM — I07.1 MODERATE TRICUSPID REGURGITATION: Status: ACTIVE | Noted: 2022-01-01

## 2022-02-25 PROBLEM — E03.9 ACQUIRED HYPOTHYROIDISM: Status: ACTIVE | Noted: 2022-01-01

## 2022-02-25 PROBLEM — I34.0 MODERATE MITRAL VALVE REGURGITATION: Status: ACTIVE | Noted: 2022-01-01

## 2022-03-04 NOTE — TELEPHONE ENCOUNTER
Patients son called in requesting another round of antibiotics sent in for his mothers foot. He states foot wound is still red.  He would like this sent to [facility] [phone].

## 2022-03-31 NOTE — PROGRESS NOTES
Cardiology Consultation/Follow Up. [facility]    CC: Patient is here for posthospitalization follow-up    HPI  Marizol Arizmendi is 81-year-old female who was recently admitted to hospital with acute congestive heart failure in 02/2022. Found to have atrial fibrillation with RVR. Underwent DIANA guided cardioversion. Converted back to A. fib next day. Discharged home on amiodarone 200 mg twice daily. LVEF 50%. Moderate MR and severe TR noted on DIANA. Today, she reports that she is feeling better. walking short distances at home, dyspnea and LE edema has improved. The right leg wound has healed. Remains in atrial fibrillation today with good rate control  Denies chest pain or palpitations  **She stopped taking Elqiuis herself and wishes not to take it     Past Medical:  Past Medical History:   Diagnosis Date    Atrial fibrillation ([address]     CAD (coronary artery disease)     CHF (congestive heart failure) (HCC)     Edema     Empyema (HCC)     Pleural effusion        Past Surgical:  Past Surgical History:   Procedure Laterality Date    CARDIOVERSION  02/10/2022       Family History:  Family History   Problem Relation Age of Onset    Cancer Brother        Social History:  Social History     Tobacco Use    Smoking status: Never Smoker    Smokeless tobacco: Never Used   Substance Use Topics    Alcohol use: Not Currently    Drug use: Never        REVIEW OF SYSTEMS:    · Constitutional: there has been decline in functional status since earlier this month  · Eyes: No visual changes or diplopia. No scleral icterus. · ENT: No Headaches, hearing loss or vertigo. No mouth sores or sore throat. · Cardiovascular: As described in HPI. · Respiratory: AS HPI  · Gastrointestinal: No abdominal pain, appetite loss, blood in stools. No change in bowel or bladder habits. · Genitourinary: No dysuria, trouble voiding, or hematuria.   · Musculoskeletal: Positive for bilateral lower extremity edema and pain  · Integumentary: Superficial ulcers on bilateral lower extremity edema  · Neurological: No headache, diplopia, change in muscle strength, numbness or tingling  · Psychiatric: No new anxiety or depression. · Endocrine: No temperature intolerance. No excessive thirst, fluid intake, or urination. No tremor. · Hematologic/Lymphatic: No abnormal bruising or bleeding, blood clots or swollen lymph nodes. · Allergic/Immunologic: No nasal congestion or hives. Medications:  Current Outpatient Medications   Medication Sig Dispense Refill    potassium chloride (KLOR-CON M) 10 MEQ extended release tablet Take 1 tablet by mouth daily 90 tablet 5    metoprolol tartrate (LOPRESSOR) 25 MG tablet Take 1 tablet by mouth 2 times daily 180 tablet 5    furosemide (LASIX) 40 MG tablet Take 1 tablet by mouth daily 90 tablet 5    apixaban (ELIQUIS) 2.5 MG TABS tablet Take 1 tablet by mouth 2 times daily 180 tablet 5    miconazole (MICOTIN) 2 % powder Apply topically 2 times daily to under breasts 45 g 1    levothyroxine (SYNTHROID) 25 MCG tablet Take 1 tablet by mouth Daily 30 tablet 3    Multiple Vitamins-Minerals (CENTRUM SILVER PO) Take 1 tablet by mouth daily      Omega-3 Fatty Acids (FISH OIL PO) Take 1 tablet by mouth daily       No current facility-administered medications for this visit. Physical Exam:   Vitals: BP (!) 138/90   Pulse 97   General appearance: alert, oriented and cooperative with exam  HEENT: Head: Normocephalic, no lesions, without obvious abnormality.   Neck: no carotid bruit, no JVD  Lungs: Reduced air entry at bases with minimal bibasilar rales  Heart: Irregularly irregular heart rhythm, no murmur  Abdomen: soft, non-tender; bowel sounds normal; no masses,  no organomegaly  Extremities: 2+ bilateral pitting edema (improved from before)   Neurologic: Mental status: Alert, oriented, thought content appropriate    Labs:  CBC: No results for input(s): WBC, HGB, HCT, PLT in the last 72 hours.  BMP: No results for input(s): NA, K, CO2, BUN, CREATININE, LABGLOM, GLUCOSE in the last 72 hours. PT/INR: No results for input(s): PROTIME, INR in the last 72 hours. FASTING LIPID PANEL:  Lab Results   Component Value Date    HDL 51 02/09/2022    TRIG 62 02/09/2022       EKG 3/31/2022: Atrial fibrillation, septal infarct, no new change. DIANA 2/9/2022  1. A DIANA was performed without complications. 2. LVEF 50%, LVH, bi-atrial enlargement   3. Severe TR, moderate MR, mild PI  3. No thrombus or valvular vegetation identified  4. Patient successfully cardioverted after 2 shocks. Past Medical and Surgical History, Problem List, Allergies, Medications, Labs, Imaging, all reviewed extensively in EMR and with the patient. Assessment:   1. Chronic diastolic congestive heart failure  2. Atrial fibrillation, new onset, s/p DIANA/cardioversion 2/10/22, converted back to A-fib on 2/11/22 , now rate controlled   3. Preserved LVEF  4. Moderate mitral regurgitation  5. Severe tricuspid regurgitation  6. Biatrial enlargement  7. Hypothyroidism      Plan:    Continue Lasix to 40 mg daily    Continue Lopressor to 25 mg twice daily   Patient stopped taking eliquis and wishes not to take it, I had detailed discussion with patient and her daughter regarding risk of CVA from not taking anticoagulation. patient understands and wants to think about it. I recommended at least taking aspirin 81 mg in the meantime. She is going to give us a call regarding her decision for willingness to take Eliquis   Repeat evaluation in 3 months or earlier if needed      Patient's medications and side effects were discussed. All questions and concerns were addressed to patient's satisfaction. Thank you for allowing me to participate in the care of this patient, please do not hesitate to call if you have any questions. Denita Barthel, MD, [address] [facility]  [facility]. [facility]  [phone]

## 2022-04-07 NOTE — TELEPHONE ENCOUNTER
[address] [facility] called with Critical value: TSH 63.87.     Wendy Romano, APRN-CNP informed. Per Martha Rendon verify that patient is taking her levothyroxine 25mcg daily and away from food and other medications. If not, please have her take it daily and as directed. If she is already taking daily and as directed, increase to 1 1/2 tabs (37.5mcg) daily. Either way, recheck TSH in 8 weeks. Also, set up Thyroid US due to significant change in TSH. \"

## 2022-04-07 NOTE — TELEPHONE ENCOUNTER
Patient states she tries to take daily and she takes it with breakfast and other medications. Discussed importance of taking every day and on empty stomach - away from food and other medication. She voiced understanding. Lab order placed & transferred call to radiology scheduling. Pt requested to schedule US with next TSH recheck.

## 2022-04-07 NOTE — PROGRESS NOTES
04/07/22  Kai Hernandez  1935      Chief Complaint:   1. Initial Medicare annual wellness visit    2. Persistent atrial fibrillation (HCC)    3. Edema, unspecified type    4. Moderate mitral valve regurgitation    5. Moderate tricuspid regurgitation    6. Acquired hypothyroidism    7. CHF NYHA class I, chronic, combined ([facility]        HPI:  81year-old patient in for welcome to Medicare. Prior to her initial visit into our office she had not been seen in 40+ years. Patient has had a hospitalization for CHF and A. fib. She has been seen by cardiology who adjusted her Lasix. Patient states sometimes she does not want to take the whole thing so she will take a half. States her leg swelling is much better however does go up and down. Is noted that patient eats salty foods and states she is going to continue. She denies any shortness of breath. No heart palpitations does continue on the beta-blocker however have stopped the Eliquis. Previously was on Eliquis 2.5 mg daily at the dose the family wanted-they wanted a low dose instead of a full dose however now patient has declined even taking the Eliquis at all and is on an aspirin 81 mg daily. She is aware of the increase cardiovascular risk. The cellulitis to her lower extremities has healed however she now has a new wound to the right foot. States her son who is an EMT has been putting a dressing on it to try to get it to heal up. She continues on her levothyroxine however does not appear to be routine with taking the medicine. She does have follow-up labs in place that she has not completed.     No Known Allergies    Past Medical History:   Diagnosis Date    Atrial fibrillation (HCC)     CAD (coronary artery disease)     CHF (congestive heart failure) (HCC)     Edema     Empyema (HCC)     Pleural effusion        Past Surgical History:   Procedure Laterality Date    CARDIOVERSION  02/10/2022       Current Outpatient Medications on File Prior to Visit   Medication Sig Dispense Refill    aspirin 81 MG EC tablet Take 81 mg by mouth daily      GARLIC PO Take by mouth daily      potassium chloride (KLOR-CON M) 10 MEQ extended release tablet Take 1 tablet by mouth daily 90 tablet 5    metoprolol tartrate (LOPRESSOR) 25 MG tablet Take 1 tablet by mouth 2 times daily 180 tablet 5    furosemide (LASIX) 40 MG tablet Take 1 tablet by mouth daily 90 tablet 5    miconazole (MICOTIN) 2 % powder Apply topically 2 times daily to under breasts 45 g 1    levothyroxine (SYNTHROID) 25 MCG tablet Take 1 tablet by mouth Daily 30 tablet 3    Multiple Vitamins-Minerals (CENTRUM SILVER PO) Take 1 tablet by mouth daily      Omega-3 Fatty Acids (FISH OIL PO) Take 1 tablet by mouth daily       No current facility-administered medications on file prior to visit. Social History     Socioeconomic History    Marital status:      Spouse name: Not on file    Number of children: Not on file    Years of education: Not on file    Highest education level: Not on file   Occupational History    Not on file   Tobacco Use    Smoking status: Never Smoker    Smokeless tobacco: Never Used   Substance and Sexual Activity    Alcohol use: Not Currently    Drug use: Never    Sexual activity: Not on file   Other Topics Concern    Not on file   Social History Narrative    Not on file     Social Determinants of Health     Financial Resource Strain:     Difficulty of Paying Living Expenses: Not on file   Food Insecurity:     Worried About Running Out of Food in the Last Year: Not on file    Samy of Food in the Last Year: Not on file   Transportation Needs:     Lack of Transportation (Medical): Not on file    Lack of Transportation (Non-Medical):  Not on file   Physical Activity: Inactive    Days of Exercise per Week: 0 days    Minutes of Exercise per Session: 0 min   Stress:     Feeling of Stress : Not on file   Social Connections:     Frequency of Communication with Friends and Family: Not on file    Frequency of Social Gatherings with Friends and Family: Not on file    Attends Yarsanism Services: Not on file    Active Member of Clubs or Organizations: Not on file    Attends Club or Organization Meetings: Not on file    Marital Status: Not on file   Intimate Partner Violence:     Fear of Current or Ex-Partner: Not on file    Emotionally Abused: Not on file    Physically Abused: Not on file    Sexually Abused: Not on file   Housing Stability:     Unable to Pay for Housing in the Last Year: Not on file    Number of [place] in the Last Year: Not on file    Unstable Housing in the Last Year: Not on file       Review of Systems   Constitutional:        Patient denies any acute symptoms. States she has a chronic swelling to her lower extremities and abdomen which significantly is improved. States her generalized weakness has much improved   All other systems reviewed and are negative. Physical Exam  Vitals and nursing note reviewed. Constitutional:       General: She is not in acute distress. Appearance: Normal appearance. She is well-developed. She is not diaphoretic. HENT:      Head: Normocephalic and atraumatic. Right Ear: External ear normal.      Left Ear: External ear normal.   Eyes:      General:         Right eye: No discharge. Left eye: No discharge. Neck:      Trachea: No tracheal deviation. Cardiovascular:      Rate and Rhythm: Normal rate and regular rhythm. Heart sounds: Normal heart sounds. No murmur heard. No friction rub. No gallop. Pulmonary:      Effort: Pulmonary effort is normal. No respiratory distress. Breath sounds: Normal breath sounds. No stridor. No wheezing, rhonchi or rales. Chest:      Chest wall: No tenderness. Abdominal:      General: Bowel sounds are normal. There is no distension. Palpations: Abdomen is soft. Tenderness: There is no abdominal tenderness. discussed the risk of rehospitalization. Discussed following 2 g sodium diet, weigh himself daily and notifying our office if there is a 2 to 3 pound weight gain in a day or 5-7 in a week      Plan:  As noted above. Follow up for routine visit. Call sooner with concerns prior.     Electronically signed by JESSICA Maradiaga CNP on 4/7/2022 at 3:18 PM

## 2022-04-07 NOTE — PATIENT INSTRUCTIONS
Personalized Preventive Plan for Tim Cole - 4/7/2022  Medicare offers a range of preventive health benefits. Some of the tests and screenings are paid in full while other may be subject to a deductible, co-insurance, and/or copay. Some of these benefits include a comprehensive review of your medical history including lifestyle, illnesses that may run in your family, and various assessments and screenings as appropriate. After reviewing your medical record and screening and assessments performed today your provider may have ordered immunizations, labs, imaging, and/or referrals for you. A list of these orders (if applicable) as well as your Preventive Care list are included within your After Visit Summary for your review. Other Preventive Recommendations:    · A preventive eye exam performed by an eye specialist is recommended every 1-2 years to screen for glaucoma; cataracts, macular degeneration, and other eye disorders. · A preventive dental visit is recommended every 6 months. · Try to get at least 150 minutes of exercise per week or 10,000 steps per day on a pedometer . · Order or download the FREE \"Exercise & Physical Activity: Your Everyday Guide\" from [employer]. Call 4-175.655.3461 or search [employer] online. · You need 9343-2694 mg of calcium and 4045-5350 IU of vitamin D per day. It is possible to meet your calcium requirement with diet alone, but a vitamin D supplement is usually necessary to meet this goal.  · When exposed to the sun, use a sunscreen that protects against both UVA and UVB radiation with an SPF of 30 or greater. Reapply every 2 to 3 hours or after sweating, drying off with a towel, or swimming. · Always wear a seat belt when traveling in a car. Always wear a helmet when riding a bicycle or motorcycle.

## 2022-04-07 NOTE — PROGRESS NOTES
Medicare Annual Wellness Visit    Jordan Malik is here for Medicare AWV and Follow-up (6 week follow up)    Assessment & Plan    Diagnosis Orders   1. Initial Medicare annual wellness visit     2. Persistent atrial fibrillation (HCC)     3. Edema, unspecified type     4. Moderate mitral valve regurgitation     5. Moderate tricuspid regurgitation     6. Acquired hypothyroidism     7. CHF NYHA class I, chronic, combined ([address]           Recommendations for Preventive Services Due: see orders and patient instructions/AVS.  Recommended screening schedule for the next 5-10 years is provided to the patient in written form: see Patient Instructions/AVS.     No follow-ups on file. Patient's complete Health Risk Assessment and screening values have been reviewed and are found in Flowsheets. The following problems were reviewed today and where indicated follow up appointments were made and/or referrals ordered.     Positive Risk Factor Screenings with Interventions:    Fall Risk:  Do you feel unsteady or are you worried about falling? : (!) yes  2 or more falls in past year?: no  Fall with injury in past year?: no     Fall Risk Interventions:    · Home safety tips provided             Health Habits/Nutrition:     Physical Activity: Inactive    Days of Exercise per Week: 0 days    Minutes of Exercise per Session: 0 min     Have you lost any weight without trying in the past 3 months?: No  Body mass index: (!) 31.12  Have you seen the dentist within the past year?: Yes    Health Habits/Nutrition Interventions:  · Inadequate physical activity:  educational materials provided to promote increased physical activity      ADLs:  In the past 7 days, did you need help from others to perform any of the following everyday activities: Eating, dressing, grooming, bathing, toileting, or walking/balance?: (!) Yes  Select all that apply: (!) Walking/Balance  In the past 7 days, did you need help from others to take care of any of the following: Laundry, housekeeping, banking/finances, shopping, telephone use, food preparation, transportation, or taking medications?: (!) Yes  Select all that apply: (!) Transportation    ADL Interventions:  · Patient declines any further evaluation/treatment for this issue          Objective   Vitals:    04/07/22 1340   BP: 122/80   Site: Left Upper Arm   Position: Sitting   Cuff Size: Large Adult   Pulse: 80   Weight: 187 lb (84.8 kg)   Height: 5' 5\" (1.651 m)      Body mass index is 31.12 kg/m². No Known Allergies  Prior to Visit Medications    Medication Sig Taking?  Authorizing Provider   aspirin 81 MG EC tablet Take 81 mg by mouth daily Yes Historical Provider, MD   GARLIC PO Take by mouth daily Yes Historical Provider, MD   potassium chloride (KLOR-CON M) 10 MEQ extended release tablet Take 1 tablet by mouth daily Yes Cristina Porter MD   metoprolol tartrate (LOPRESSOR) 25 MG tablet Take 1 tablet by mouth 2 times daily Yes Cristina Porter MD   furosemide (LASIX) 40 MG tablet Take 1 tablet by mouth daily Yes Cristina Porter MD   miconazole (MICOTIN) 2 % powder Apply topically 2 times daily to under breasts Yes JESSICA Virgen NP   levothyroxine (SYNTHROID) 25 MCG tablet Take 1 tablet by mouth Daily Yes JESSICA Virgen NP   Multiple Vitamins-Minerals (CENTRUM SILVER PO) Take 1 tablet by mouth daily Yes Historical Provider, MD   Omega-3 Fatty Acids (FISH OIL PO) Take 1 tablet by mouth daily Yes Historical Provider, MD Mishra (Including outside providers/suppliers regularly involved in providing care):   Patient Care Team:  JESSICA Nascimento CNP as PCP - General (Nurse Practitioner)  JESSICA Nascimento CNP as PCP - REHABILITATION [facility] Empaneled Provider    Reviewed and updated this visit:  Tobacco  Allergies  Meds  Med Hx  Surg Hx  Soc Hx  Fam Hx              I, bAhi Blanton LPN, 0/6/0066, performed the documented evaluation under the direct supervision of the attending physician.

## 2022-04-16 NOTE — PROGRESS NOTES
[facility] [address] [place] [address] [address]  [address] [place] [address]  Dept: [phone]  Dept Fax: [phone]    Tim Cole is a 81 y.o. female who presents today for her medical conditions/complaints as noted below. Tim Cole is c/o of   Chief Complaint   Patient presents with    Foot Swelling     red,slight warm to the touch, lesion on outside greater toe,felt like pin and needles        HPI:     HPI Here today for leg swelling. Here today for feet swelling. She has had an infection before but the swelling has worsened so an area opened on her right great toe. Her feet hurt a few days ago with a tingling sensation. No fevers. Right foot was really warm last night. The swelling has been new in the past 6 months. She has been hospitalized several times for CHF.        Past Medical History:   Diagnosis Date    Atrial [address] ([facility] [address]     CAD (coronary artery disease)     CHF (congestive heart failure) (HCC)     Edema     Empyema (HCC)     Pleural effusion           Social History     Tobacco Use    Smoking status: Never Smoker    Smokeless tobacco: Never Used   Substance Use Topics    Alcohol use: Not Currently     Current Outpatient Medications   Medication Sig Dispense Refill    aspirin 81 MG EC tablet Take 81 mg by mouth daily      GARLIC PO Take by mouth daily      potassium chloride (KLOR-CON M) 10 MEQ extended release tablet Take 1 tablet by mouth daily 90 tablet 5    metoprolol tartrate (LOPRESSOR) 25 MG tablet Take 1 tablet by mouth 2 times daily 180 tablet 5    furosemide (LASIX) 40 MG tablet Take 1 tablet by mouth daily 90 tablet 5    miconazole (MICOTIN) 2 % powder Apply topically 2 times daily to under breasts 45 g 1    levothyroxine (SYNTHROID) 25 MCG tablet Take 1 tablet by mouth Daily 30 tablet 3    Multiple Vitamins-Minerals (CENTRUM SILVER PO) Take 1 tablet by mouth daily      Omega-3 Fatty Acids (FISH OIL PO) Take 1 tablet by mouth daily       No current facility-administered medications for this visit. No Known Allergies    Subjective:     Review of Systems   Constitutional: Negative for activity change, appetite change, chills, fatigue and fever. Eyes: Negative for visual disturbance. Respiratory: Positive for shortness of breath. Negative for cough, chest tightness and wheezing. Cardiovascular: Positive for leg swelling. Negative for chest pain and palpitations. Skin: Positive for color change and wound. Neurological: Negative for dizziness, syncope, weakness and light-headedness. Objective:      Physical Exam  Vitals and nursing note reviewed. Constitutional:       General: She is not in acute distress. Appearance: She is well-developed. Eyes:      Conjunctiva/sclera: Conjunctivae normal.   Neck:      Thyroid: No thyromegaly. Cardiovascular:      Rate and Rhythm: Normal rate and regular rhythm. Pulses: Decreased pulses. Heart sounds: Normal heart sounds. No murmur heard. Comments: Pitting edema extends to hip bilaterally. Toes were warm, but it was difficult to feel pulses  Pulmonary:      Effort: Pulmonary effort is normal. No respiratory distress. Breath sounds: Normal breath sounds. No wheezing. Musculoskeletal:      Cervical back: Normal range of motion and neck supple. Right lower le+ Pitting Edema present. Left lower le+ Pitting Edema present. Lymphadenopathy:      Cervical: No cervical adenopathy. Skin:     General: Skin is warm and dry. Findings: No erythema or rash. Neurological:      Mental Status: She is alert and oriented to person, place, and time.                        /82 (Site: Left Upper Arm, Position: Sitting, Cuff Size: Medium Adult)   Pulse 79   Temp 98.2 °F (36.8 °C) (Tympanic)   Resp 18   Ht 5' 5\" (1.651 m)   Wt 187 lb (84.8 kg)   SpO2

## 2022-04-18 NOTE — TELEPHONE ENCOUNTER
Spoke with patient daughter she said patient was only taking one 25mcg daily. So I advised them to increase to 1 1/2 tablet.

## 2022-04-18 NOTE — TELEPHONE ENCOUNTER
Left message for patient to return call to verify current dose of levothyroxine. Per Franky Alcantara, if she is taking only 1 tab daily of levothyroxine 25mcg, recommend she increase to 1 1/2 tabs daily.

## 2022-04-27 NOTE — TELEPHONE ENCOUNTER
Patient and family call in to tell ask what they should do for patient right foot is having poor circulation . Her toe are starting to look blue in color. This has been happening now for 2 weeks. I told patient they should go to ER or urgent care to be seen. The decided to make an appointment with Katrin De La Paz for today at 3pm. I spoke with Augustin Sebastian she advise me to tell them to go to ER or Urgent care as well.

## 2022-04-27 NOTE — ED PROVIDER NOTES
Hematological: Negative for adenopathy. Does not bruise/bleed easily. Psychiatric/Behavioral: Negative for confusion, self-injury and suicidal ideas. PAST MEDICAL HISTORY    has a past medical history of Atrial fibrillation ([facility] [address] CAD (coronary artery disease), CHF (congestive heart failure) ([facility] [address] Edema, Empyema ([facility] [address] and Pleural effusion. SURGICAL HISTORY      has a past surgical history that includes Cardioversion (02/10/2022). CURRENT MEDICATIONS       Discharge Medication List as of 4/27/2022 11:10 PM      CONTINUE these medications which have NOT CHANGED    Details   potassium chloride (KLOR-CON M) 10 MEQ extended release tablet Take 1 tablet by mouth daily, Disp-90 tablet, R-3Normal      furosemide (LASIX) 40 MG tablet Take 1 tablet by mouth daily, Disp-90 tablet, R-5Normal      metoprolol tartrate (LOPRESSOR) 25 MG tablet Take 1 tablet by mouth 2 times daily, Disp-180 tablet, R-5Normal      levothyroxine (SYNTHROID) 25 MCG tablet Take 1.5 tablets by mouth Daily, Disp-135 tablet, R-0Normal      aspirin 81 MG EC tablet Take 81 mg by mouth dailyHistorical Med      GARLIC PO Take by mouth dailyHistorical Med      miconazole (MICOTIN) 2 % powder Apply topically 2 times daily to under breasts, Disp-45 g, R-1, Print      Multiple Vitamins-Minerals (CENTRUM SILVER PO) Take 1 tablet by mouth dailyHistorical Med      Omega-3 Fatty Acids (FISH OIL PO) Take 1 tablet by mouth dailyHistorical Med             ALLERGIES     has No Known Allergies. FAMILY HISTORY     She indicated that the status of her brother is unknown.     family history includes Cancer in her brother. SOCIAL HISTORY      reports that she has never smoked. She has never used smokeless tobacco. She reports previous alcohol use. She reports that she does not use drugs. PHYSICAL EXAM     INITIAL VITALS:  height is 5' 5\" (1.651 m) and weight is 180 lb (81.6 kg). Her tympanic temperature is 97.7 °F (36.5 °C).  Her blood pressure is 123/88 and her pulse is 87. Her respiration is 18 and oxygen saturation is 94%. Physical Exam  Vitals and nursing note reviewed. Constitutional:       Appearance: Normal appearance. She is well-developed. HENT:      Head: Normocephalic and atraumatic. Right Ear: External ear normal.      Left Ear: External ear normal.   Eyes:      Conjunctiva/sclera: Conjunctivae normal.      Pupils: Pupils are equal, round, and reactive to light. Cardiovascular:      Rate and Rhythm: Normal rate and regular rhythm. Pulmonary:      Effort: Pulmonary effort is normal.      Breath sounds: Normal breath sounds. Abdominal:      General: Bowel sounds are normal.      Palpations: Abdomen is soft. Musculoskeletal:         General: Swelling present. No tenderness. Normal range of motion. Cervical back: Normal range of motion. Right lower leg: Edema present. Left lower leg: Edema present. Comments: Both legs are swollen red she has some skin changes to the anterior shin on the left edema is pitting her capillary refill is slightly decreased bilaterally her left leg is warmer than the right the toes are dusky appearance on the right foot but she has full range of motion there is no wounds dorsalis and posterior tibial pulses are weak but palpable bilaterally   Skin:     General: Skin is warm and dry. Neurological:      Mental Status: She is alert and oriented to person, place, and time.    Psychiatric:         Behavior: Behavior normal.           DIFFERENTIAL DIAGNOSIS/ MDM:     Edema of the legs with redness probable CHF also blue toes probably peripheral ischemia no evidence of gross limb ischemia currently we will do a work-up for cellulitis DVT or CHF    DIAGNOSTIC RESULTS     EKG: All EKG's are interpreted by the Emergency Department Physician who either signs or Co-signs this chart in the absence of a cardiologist.  Atrial fibrillation rate of 81 bpm, QRS durations 86 ms QT corrected 439 ms axis is 52 there is no acute ST or T wave changes she does have Q waves across the septal leads suggestive of an old infarct      RADIOLOGY:   I directly visualized the following  images and reviewed the radiologist interpretations:     EXAMINATION:   ONE XRAY VIEW OF THE CHEST       4/27/2022 5:06 pm       COMPARISON:   Chest x-ray dated 11 February 2022       HISTORY:   ORDERING SYSTEM PROVIDED HISTORY: CHF   TECHNOLOGIST PROVIDED HISTORY:   CHF   Reason for Exam: SOB; right lower extremity weakness       FINDINGS:   Stable cardiomegaly.  Slight interval increase in size of a moderate   right-sided pleural effusion with hazy basilar airspace disease.  Trace left   pleural effusion.  No pneumothorax.           Impression   1.  Slight interval increase in size of a moderate right-sided pleural   effusion with associated hazy basilar airspace disease.       2.  Stable cardiomegaly and trace left pleural effusion            EXAMINATION:   DUPLEX VENOUS ULTRASOUND OF THE BILATERAL LOWER EXTREMITIES4/27/2022 5:20 pm       TECHNIQUE:   Duplex ultrasound using B-mode/gray scaled imaging, Doppler spectral analysis   and color flow Doppler was obtained of the deep venous structures of the   lower bilateral extremities.       COMPARISON:   None.       HISTORY:   ORDERING SYSTEM PROVIDED HISTORY: Leg swelling left greater than right   TECHNOLOGIST PROVIDED HISTORY:   Leg swelling left greater than right       FINDINGS:   The visualized veins of the bilateral lower extremities are patent and free   of echogenic thrombus. The veins demonstrate good compressibility with normal   color flow study and spectral analysis.            Impression   No evidence of DVT in either lower extremity.                 ED BEDSIDE ULTRASOUND:       LABS:  Labs Reviewed   CBC WITH AUTO DIFFERENTIAL - Abnormal; Notable for the following components:       Result Value    Hemoglobin 15.3 (*)     Hematocrit 47.6 (*)     RDW 22.0 (*)     Lymphocytes 16 (*)     Seg Neutrophils 73 (*)     Absolute Lymph # 1.02 (*)     All other components within normal limits   BRAIN NATRIURETIC PEPTIDE - Abnormal; Notable for the following components:    Pro-BNP 5,149 (*)     All other components within normal limits   COMPREHENSIVE METABOLIC PANEL W/ REFLEX TO MG FOR LOW K - Abnormal; Notable for the following components:    Glucose 118 (*)     BUN 26 (*)     Bun/Cre Ratio 31 (*)     Alkaline Phosphatase 116 (*)     AST 43 (*)     Total Bilirubin 3.29 (*)     All other components within normal limits   TROPONIN - Abnormal; Notable for the following components:    Troponin, High Sensitivity 27 (*)     All other components within normal limits   PROTIME-INR - Abnormal; Notable for the following components:    Protime 14.8 (*)     All other components within normal limits   TROPONIN - Abnormal; Notable for the following components:    Troponin, High Sensitivity 24 (*)     All other components within normal limits   CULTURE, BLOOD 1   CULTURE, BLOOD 1   LACTIC ACID   URINALYSIS WITH REFLEX TO CULTURE           EMERGENCY DEPARTMENT COURSE:   Vitals:    Vitals:    04/27/22 1621 04/27/22 1700 04/27/22 1730   BP: (!) 148/87 136/83 123/88   Pulse: 91 90 87   Resp: 18 18 18   Temp: 97.7 °F (36.5 °C)     TempSrc: Tympanic     SpO2: 94%  94%   Weight: 180 lb (81.6 kg)     Height: 5' 5\" (1.651 m)       -------------------------  BP: 123/88, Temp: 97.7 °F (36.5 °C), Pulse: 87, Resp: 18        Re-evaluation Notes        CRITICAL CARE:   None        CONSULTS:      PROCEDURES:  None    FINAL IMPRESSION      1. Acute on chronic congestive heart failure, unspecified heart failure type ([address]    2. Cellulitis of left lower extremity    3. Ischemia of foot          DISPOSITION/PLAN   DISPOSITION care of this patient is passed to the oncoming physician at end of my shift 1930 hrs.     Condition on Disposition    Stable    PATIENT REFERRED TO:  JESSICA Alvarez - CNP  [address] [id number]  [phone]    In 2 days      Gracy Gottron, MD  [address]  [address] [place]  [phone]    In 1 day        DISCHARGE MEDICATIONS:  Discharge Medication List as of 4/27/2022 11:10 PM      START taking these medications    Details   doxycycline hyclate (VIBRA-TABS) 100 MG tablet Take 1 tablet by mouth 2 times daily for 10 days, Disp-20 tablet, R-0Normal             (Please note that portions of this note were completed with a voice recognition program.  Efforts were made to edit the dictations but occasionally words are mis-transcribed.)    Sascha Massey MD,, MD, F.A.A.E.M.   Attending Emergency Physician                          Sascha Massey MD  05/03/22 1443

## 2022-04-27 NOTE — TELEPHONE ENCOUNTER
Patient and family call in to tell ask what they should do for patient right foot is having poor circulation . Her toe are starting to look blue in color. This has been happening now for 2 weeks. I told patient they should go to ER or urgent care to be seen. The decided to make an appointment with Rishabh Roy for today at 3pm. I spoke with Akil Cardenas she advise me to tell them to go to ER or Urgent care as well.

## 2022-04-28 PROBLEM — R23.0 BLUE TOES: Status: ACTIVE | Noted: 2022-01-01

## 2022-04-28 NOTE — PROGRESS NOTES
[facility]  DR ORTA MILY  [address]  [place] [address] [place]  [address] [facility]  1935  81 y. o.female       Chief Complaint:  Chief Complaint   Patient presents with    Cellulitis     bilateral lower leg       History of present Illness:  Pt is here today for evaluation and treatment of cyanosis of the toes of both feet. This is an 60-year-old female with a history of congestive heart failure. She was admitted to the ER with a CHF exacerbation last night and they were concerned because of cyanosis noted in toes in both feet. She has a longstanding history of lower extremity edema with chronic skin changes. Her son wraps her legs faithfully and states that on several occasions she has had baseline cyanosis for years but there were several areas that did not carlos a. The ER physician last night had concern about acute arterial occlusion and I was able to reassure the patient and family that because of the toes still blanched and she has no pain whatsoever its certainly is not reflective of arterial occlusion acutely. We discussed the role of controlling the swelling in her legs. After reassuring patient it seems that both the sister and son are very intent on given her ulcers or she needs to control the lower extremity swelling. Past Medical History:  has a past medical history of Atrial fibrillation ([address] CAD (coronary artery disease), CHF (congestive heart failure) ([address] Edema, Empyema ([address] and Pleural effusion. Past Surgical History:   Past Surgical History:   Procedure Laterality Date    CARDIOVERSION  02/10/2022       Social History:  reports that she has never smoked. She has never used smokeless tobacco. She reports previous alcohol use. She reports that she does not use drugs. Family History: family history includes Cancer in her brother.     Review of Systems:   Constitutional:  negative for  fevers, chills, sweats, fatigue, and weight loss  HEENT:  negative for vision or hearing changes,   Respiratory:  negative for shortness of breath, cough, or congestion  Cardiovascular:  negative for  chest pain, palpitations  Gastrointestinal:  negative for nausea, vomiting, diarrhea, constipation, abdominal pain  Genitourinary:  negative for frequency, dysuria  Integument/Breast:  negative for rash, skin lesions  Musculoskeletal:  negative for muscle aches or joint pain  Neurological:  negative for headaches, dizziness, lightheadedness, numbness, pain and tingling extremities  Behavior/Psych:  negative for depression and anxiety    Allergies: Patient has no known allergies.     Current Meds:  Current Outpatient Medications:     doxycycline hyclate (VIBRA-TABS) 100 MG tablet, Take 1 tablet by mouth 2 times daily for 10 days, Disp: 20 tablet, Rfl: 0    potassium chloride (KLOR-CON M) 10 MEQ extended release tablet, Take 1 tablet by mouth daily, Disp: 90 tablet, Rfl: 3    furosemide (LASIX) 40 MG tablet, Take 1 tablet by mouth daily, Disp: 90 tablet, Rfl: 5    metoprolol tartrate (LOPRESSOR) 25 MG tablet, Take 1 tablet by mouth 2 times daily, Disp: 180 tablet, Rfl: 5    levothyroxine (SYNTHROID) 25 MCG tablet, Take 1.5 tablets by mouth Daily, Disp: 135 tablet, Rfl: 0    aspirin 81 MG EC tablet, Take 81 mg by mouth daily, Disp: , Rfl:     GARLIC PO, Take by mouth daily, Disp: , Rfl:     miconazole (MICOTIN) 2 % powder, Apply topically 2 times daily to under breasts, Disp: 45 g, Rfl: 1    Multiple Vitamins-Minerals (CENTRUM SILVER PO), Take 1 tablet by mouth daily, Disp: , Rfl:     Omega-3 Fatty Acids (FISH OIL PO), Take 1 tablet by mouth daily, Disp: , Rfl:     Vital Signs:  Vitals:    04/28/22 1331   BP: 124/70   Pulse: 74       Physical Exam:  General appearance - alert, well appearing and in no acute distress  Mental status - oriented to person, place and time with normal affect  Head - normocephalic and atraumatic  Eyes - pupils equal and reactive, extraocular eye movements intact, conjunctiva clear  Ears - hearing appears to be intact  Nose - no drainage noted  Mouth - mucous membranes moist  Neck - supple, no carotid bruits, thyroid not palpable, no JVD  Chest - clear to auscultation, normal effort  Heart - normal rate, regular rhythm, no murmurs  Abdomen - soft, non-tender, non-distended, bowel sounds present all four quadrants, no masses, hepatomegaly, splenomegaly or aortic enlargement  Neurological - normal speech, no focal findings or movement disorder noted, cranial nerves II through XII grossly intact  Extremities - peripheral pulses doppler, 2+pedal edema no calf pain  Skin - no gross lesions, rashes, or induration noted. Lower extremity pitting edema consistent with congestive heart failure. Mild livedo dermatosclerosis on the left. Toes are cyanotic bilaterally with delayed capillary refill also commonly seen in CHF        Labs:   Lab Results   Component Value Date    WBC 6.4 04/27/2022    HGB 15.3 04/27/2022    HCT 47.6 04/27/2022    MCV 93.7 04/27/2022     04/27/2022     Lab Results   Component Value Date     04/27/2022    K 4.3 04/27/2022     04/27/2022    CO2 31 04/27/2022    BUN 26 04/27/2022    CREATININE 0.85 04/27/2022    GLUCOSE 118 04/27/2022    CALCIUM 9.5 04/27/2022     Lab Results   Component Value Date    ALKPHOS 116 04/27/2022    ALT 19 04/27/2022    AST 43 04/27/2022    PROT 7.1 04/27/2022    BILITOT 3.29 04/27/2022    LABALBU 4.1 04/27/2022     Lab Results   Component Value Date    LACTA 1.8 04/27/2022     No results found for: AMYLASE  No results found for: LIPASE  Lab Results   Component Value Date    INR 1.2 04/27/2022         Assessment:  52-year-old female with congestive heart failure and cyanosis of the toes  1. CHF (congestive heart failure), NYHA class I, acute on chronic, combined (HCC)    2. Blue toes        Plan:   1.  I have no major concerns about ischemia to her feet. She does seem to be carrying more fluid and I recommend continued follow-up with her cardiologist.  Her legs should continue to be wraps of both day and night and the use of compression stockings should be employed. No orders of the defined types were placed in this encounter.           Electronically signed by Eladia Mosley MD on 4/28/2022 at 2:36 PM     Copy sent to Dr. Joelle Reynolds, JESSICA - CNP

## 2022-04-28 NOTE — ED PROVIDER NOTES
ADDENDUM:      Care of this patient was assumed from Dr. Romana Cardinal. The patient was seen for Other (toes  2-4 on right foot blue, went to urgent care last week getting worse)  . The patient's initial evaluation and plan have been discussed with the prior provider who initially evaluated the patient. Nursing Notes, Past Medical Hx, Past Surgical Hx, Social Hx, Family Hx, Medications and Allergies, and  were all reviewed. Diagnostic Results     EKG     Twelve-lead EKG interpreted by Dr. Romana Cardinal, I have reviewed the tracing. Please see his interpretation. RADIOLOGY:    XR CHEST PORTABLE    Result Date: 4/27/2022  EXAMINATION: ONE XRAY VIEW OF THE CHEST 4/27/2022 5:06 pm COMPARISON: Chest x-ray dated 11 February 2022 HISTORY: ORDERING SYSTEM PROVIDED HISTORY: CHF TECHNOLOGIST PROVIDED HISTORY: CHF Reason for Exam: SOB; right lower extremity weakness FINDINGS: Stable cardiomegaly. Slight interval increase in size of a moderate right-sided pleural effusion with hazy basilar airspace disease. Trace left pleural effusion. No pneumothorax. 1.  Slight interval increase in size of a moderate right-sided pleural effusion with associated hazy basilar airspace disease. 2.  Stable cardiomegaly and trace left pleural effusion     VL DUP LOWER EXTREMITY VENOUS BILATERAL    Result Date: 4/27/2022  EXAMINATION: DUPLEX VENOUS ULTRASOUND OF THE BILATERAL LOWER EXTREMITIES4/27/2022 5:20 pm TECHNIQUE: Duplex ultrasound using B-mode/gray scaled imaging, Doppler spectral analysis and color flow Doppler was obtained of the deep venous structures of the lower bilateral extremities. COMPARISON: None. HISTORY: ORDERING SYSTEM PROVIDED HISTORY: Leg swelling left greater than right TECHNOLOGIST PROVIDED HISTORY: Leg swelling left greater than right FINDINGS: The visualized veins of the bilateral lower extremities are patent and free of echogenic thrombus.  The veins demonstrate good compressibility with normal color flow study and spectral analysis. No evidence of DVT in either lower extremity. LABS:   Results for orders placed or performed during the hospital encounter of 04/27/22   Culture, Blood 1    Specimen: Blood   Result Value Ref Range    Specimen Description . BLOOD 10 ML RN IVQ     Culture NO GROWTH <24 HRS    Culture, Blood 1    Specimen: Blood   Result Value Ref Range    Specimen Description . BLOOD 20ML LEFT AC     Culture NO GROWTH <24 HRS    CBC with Auto Differential   Result Value Ref Range    WBC 6.4 3.5 - 11.3 k/uL    RBC 5.08 3.95 - 5.11 m/uL    Hemoglobin 15.3 (H) 11.9 - 15.1 g/dL    Hematocrit 47.6 (H) 36.3 - 47.1 %    MCV 93.7 82.6 - 102.9 fL    MCH 30.1 25.2 - 33.5 pg    MCHC 32.1 25.2 - 33.5 g/dL    RDW 22.0 (H) 11.8 - 14.4 %    Platelets 260 404 - 626 k/uL    MPV 11.8 8.1 - 13.5 fL    NRBC Automated 0.0 0.0 per 100 WBC    Monocytes 9 3 - 12 %    Lymphocytes 16 (L) 24 - 43 %    Seg Neutrophils 73 (H) 36 - 65 %    Eosinophils % 1 1 - 4 %    Basophils 1 0 - 2 %    Immature Granulocytes 0 0 %    Absolute Mono # 0.58 0.10 - 1.20 k/uL    Absolute Lymph # 1.02 (L) 1.10 - 3.70 k/uL    Segs Absolute 4.68 1.50 - 8.10 k/uL    Absolute Eos # 0.06 0.00 - 0.44 k/uL    Basophils Absolute 0.06 0.00 - 0.20 k/uL    Absolute Immature Granulocyte 0.00 0.00 - 0.30 k/uL    Morphology ANISOCYTOSIS PRESENT    Brain Natriuretic Peptide   Result Value Ref Range    Pro-BNP 5,149 (H) <300 pg/mL   Comprehensive Metabolic Panel w/ Reflex to MG   Result Value Ref Range    Glucose 118 (H) 70 - 99 mg/dL    BUN 26 (H) 8 - 23 mg/dL    CREATININE 0.85 0.50 - 0.90 mg/dL    Bun/Cre Ratio 31 (H) 9 - 20    Calcium 9.5 8.6 - 10.4 mg/dL    Sodium 143 135 - 144 mmol/L    Potassium 4.3 3.7 - 5.3 mmol/L    Chloride 101 98 - 107 mmol/L    CO2 31 20 - 31 mmol/L    Anion Gap 11 9 - 17 mmol/L    Alkaline Phosphatase 116 (H) 35 - 104 U/L    ALT 19 5 - 33 U/L    AST 43 (H) <32 U/L    Total Bilirubin 3.29 (H) 0.3 - 1.2 mg/dL    Total Protein 7.1 6.4 - 8.3 g/dL    Albumin 4.1 3.5 - 5.2 g/dL    Albumin/Globulin Ratio 1.4 1.0 - 2.5    GFR Non-African American >60 >60 mL/min    GFR African American >60 >60 mL/min    GFR Comment         Troponin   Result Value Ref Range    Troponin, High Sensitivity 27 (H) 0 - 14 ng/L   Protime-INR   Result Value Ref Range    Protime 14.8 (H) 11.5 - 14.2 sec    INR 1.2    Lactic Acid   Result Value Ref Range    Lactic Acid 1.8 0.5 - 2.2 mmol/L   Troponin   Result Value Ref Range    Troponin, High Sensitivity 24 (H) 0 - 14 ng/L   EKG 12 Lead   Result Value Ref Range    Ventricular Rate 81 BPM    Atrial Rate 69 BPM    QRS Duration 86 ms    Q-T Interval 378 ms    QTc Calculation (Bazett) 439 ms    R Axis 52 degrees    T Axis 94 degrees       RECENT VITALS:  BP: 123/88, Temp: 97.7 °F (36.5 °C), Pulse: 87, Resp: 18     ED Course     The patient was given the following medications:  Orders Placed This Encounter   Medications    furosemide (LASIX) injection 40 mg    cefTRIAXone (ROCEPHIN) 1000 mg IVPB in 50 mL D5W minibag     Order Specific Question:   Antimicrobial Indications     Answer:   Skin and Soft Tissue Infection    doxycycline hyclate (VIBRA-TABS) 100 MG tablet     Sig: Take 1 tablet by mouth 2 times daily for 10 days     Dispense:  20 tablet     Refill:  0       Medical Decision Making      Case signed out to me by Dr. Isaiah Galeano. Patient was referred by her PCP for evaluation of color changes to the second through fifth toe of the right foot this has been intermittent over the last 2 to 3 weeks. I have interviewed the patient she denies any chest pain or increased shortness of breath. She states that she always sleeps in a recliner. She does have some swelling of the lower extremities. At this point there are for clinical concerns. The first is vascular compromise. We did do bedside Dopplers on her lower extremities. I was able to Doppler a dorsalis pedis pulse in the affected foot.   The Doppler signal in the dorsalis pedis of the left foot was a slightly stronger. There are no new open wounds to the foot. There is duskiness of the toes and the distal plantar surface of the foot. Discussed the case with vascular surgery Dr. Jody Pathak. He will be in the [address] [place] [address] 7 clinic tomorrow and will see her at that point. Did not recommend anticoagulation or aspirin at this time. Family is to call the office in the morning and take her sometime between 8 AM and 4 PM.  I did recommend the patient be admitted to our hospital given her other clinical concerns 1 congestive heart failure 2 cellulitis of the left leg and 3 possible developing pneumonia. Patient denies increased shortness of breath or cough or fever. She very strongly did not want to be admitted to the hospital.  We discussed in depth some of the potential complications including development of sepsis limb ischemia permanent disability and/or death. Patient maintains that she prefers to try outpatient treatment. I will give her IV Rocephin as well as start her on doxycycline. She will try to follow-up with her PCP within 1 to 2 days. I also discussed the case with Dr. Jigar Chapin who is on-call for the patient's PCP. I have given her a dose of IV Lasix here in the emergency department. I recommended that she increase her dose to 80 mg tomorrow and see her PCP for further dosing adjustments. Doxycycline is reasonable coverage for both cellulitis and pneumonia. Patient was educated on the warning signs which return to the emergency department. She lives with her son who is also a former EMT. He states that he is also able to monitor her blood pressure. Given that these are the patient's wishes and she understands a potential complication I feel that this is a reasonable plan as she is not agreeable with inpatient treatment.   Additionally the patient is in atrial fibrillation she is not anticoagulated I have reviewed the most recent cardiology note she was cardioverted recently but then found to be back in atrial fibrillation again. She has declined anticoagulation at this time. Her son also indicates that they had discussed that extensively with her and she is not agreeable to that yet. She had previously been taking Eliquis and stopped it per the cardiology note. Disposition     FINAL IMPRESSION      1. Acute on chronic congestive heart failure, unspecified heart failure type ([address]    2. Cellulitis of left lower extremity    3.  Ischemia of foot          DISPOSITION/PLAN   DISPOSITION Decision To Discharge 04/27/2022 10:03:59 PM      PATIENT REFERRED TO:  JESSICA Kingston [facility]  [phone]    In 2 days      Qi Betts MD  [address] [place] [address]  [phone]    In 1 day        DISCHARGE MEDICATIONS:  Discharge Medication List as of 4/27/2022 11:10 PM      START taking these medications    Details   doxycycline hyclate (VIBRA-TABS) 100 MG tablet Take 1 tablet by mouth 2 times daily for 10 days, Disp-20 tablet, R-0Normal                   (Please note that portions of this note were completed with a voice recognition program.  Efforts were made to edit the dictations but occasionally words are mis-transcribed.)    Coty Gunn MD, [facility]  Attending Emergency Medicine Physician                  Coty Gunn MD  04/28/22 [id number]

## 2022-05-02 NOTE — TELEPHONE ENCOUNTER
From: Laura Mcgarry  To: Kera Cai  Sent: 4/30/2022 3:22 PM EDT  Subject: lab work for thyroid Latrice    the apt is scheduled for 1:00 this isn't a fasting blood draw is it? That is a long time not eating. ..she doesn't eat much but small amounts and usually by 8:00 am or so for breakfast.

## 2022-05-03 NOTE — PROGRESS NOTES
05/03/22  Kai Hernandez  1935      Chief Complaint:   1. Cellulitis of left lower extremity    2. CHF (congestive heart failure), NYHA class I, acute on chronic, combined ([facility]    3. Atrial fibrillation, unspecified type ([facility]    4. Moderate mitral valve regurgitation    5. Moderate tricuspid regurgitation    6. Discoloration of skin of foot        HPI:  81year-old patient in for ER follow-up. Patient was seen in the emergency room on 4/27/2022 for concerns of blue toes. Patient did undergo venous Dopplers. Which were negative for DVT. She was seen by the vascular surgeon the following day who felt this was not vascular in relation but it was due to heart failure. Patient states the swelling is improving to her legs but no she still has some fluid there. She denies any orthopnea. Sleeps in her recliner all the way back. Denies any heart palpitation. Does continue to follow with cardiology. Denies any heart palpitations.       No Known Allergies    Past Medical History:   Diagnosis Date    Atrial fibrillation (HCC)     CAD (coronary artery disease)     CHF (congestive heart failure) (HCC)     Edema     Empyema (HCC)     Pleural effusion        Past Surgical History:   Procedure Laterality Date    CARDIOVERSION  02/10/2022       Current Outpatient Medications on File Prior to Visit   Medication Sig Dispense Refill    doxycycline hyclate (VIBRA-TABS) 100 MG tablet Take 1 tablet by mouth 2 times daily for 10 days 20 tablet 0    potassium chloride (KLOR-CON M) 10 MEQ extended release tablet Take 1 tablet by mouth daily 90 tablet 3    furosemide (LASIX) 40 MG tablet Take 1 tablet by mouth daily 90 tablet 5    metoprolol tartrate (LOPRESSOR) 25 MG tablet Take 1 tablet by mouth 2 times daily 180 tablet 5    levothyroxine (SYNTHROID) 25 MCG tablet Take 1.5 tablets by mouth Daily 135 tablet 0    aspirin 81 MG EC tablet Take 81 mg by mouth daily      GARLIC PO Take by mouth daily      miconazole (MICOTIN) 2 % powder Apply topically 2 times daily to under breasts 45 g 1    Multiple Vitamins-Minerals (CENTRUM SILVER PO) Take 1 tablet by mouth daily      Omega-3 Fatty Acids (FISH OIL PO) Take 1 tablet by mouth daily       No current facility-administered medications on file prior to visit. Social History     Socioeconomic History    Marital status:      Spouse name: Not on file    Number of children: Not on file    Years of education: Not on file    Highest education level: Not on file   Occupational History    Not on file   Tobacco Use    Smoking status: Never Smoker    Smokeless tobacco: Never Used   Substance and Sexual Activity    Alcohol use: Not Currently    Drug use: Never    Sexual activity: Not on file   Other Topics Concern    Not on file   Social History Narrative    Not on file     Social Determinants of Health     Financial Resource Strain: Low Risk     Difficulty of Paying Living Expenses: Not hard at all   Food Insecurity: No Food Insecurity    Worried About Running Out of Food in the Last Year: Never true    [address] in the Last Year: Never true   Transportation Needs:     Lack of Transportation (Medical): Not on file    Lack of Transportation (Non-Medical):  Not on file   Physical Activity: Inactive    Days of Exercise per Week: 0 days    Minutes of Exercise per Session: 0 min   Stress:     Feeling of Stress : Not on file   Social Connections:     Frequency of Communication with Friends and Family: Not on file    Frequency of Social Gatherings with Friends and Family: Not on file    Attends [employer] Services: Not on file    Active Member of Clubs or Organizations: Not on file    Attends Club or Organization Meetings: Not on file    Marital Status: Not on file   Intimate Partner Violence:     Fear of Current or Ex-Partner: Not on file    Emotionally Abused: Not on file    Physically Abused: Not on file    Sexually Abused: Not on file   Housing Stability:     Unable to Pay for Housing in the Last Year: Not on file    Number of Places Lived in the Last Year: Not on file    Unstable Housing in the Last Year: Not on file       Review of Systems   Constitutional: Negative for activity change, appetite change, chills, fatigue, fever and unexpected weight change. HENT: Negative for congestion, dental problem, ear discharge, ear pain, facial swelling, hearing loss, postnasal drip, rhinorrhea, sinus pressure, sore throat and trouble swallowing. Eyes: Negative for pain and visual disturbance. Respiratory: Negative for cough, chest tightness, shortness of breath and wheezing. Cardiovascular: Positive for leg swelling. Negative for chest pain and palpitations. Gastrointestinal: Negative for abdominal pain, blood in stool, constipation, diarrhea, nausea and vomiting. Endocrine: Negative for cold intolerance, heat intolerance and polyuria. Genitourinary: Negative for difficulty urinating. Musculoskeletal: Positive for arthralgias. Negative for gait problem, myalgias, neck pain and neck stiffness. Skin: Negative for color change, rash and wound. Neurological: Negative for dizziness, tremors, seizures, weakness, light-headedness, numbness and headaches. Psychiatric/Behavioral: Negative for confusion and hallucinations. The patient is not nervous/anxious. Physical Exam  Vitals and nursing note reviewed. Constitutional:       General: She is not in acute distress. Appearance: Normal appearance. She is well-developed. She is not diaphoretic. HENT:      Head: Normocephalic and atraumatic. Right Ear: External ear normal.      Left Ear: External ear normal.   Eyes:      General:         Right eye: No discharge. Left eye: No discharge. Neck:      Trachea: No tracheal deviation. Cardiovascular:      Rate and Rhythm: Normal rate. Rhythm irregular. Heart sounds: Normal heart sounds. No murmur heard.   No friction rub. No gallop. Pulmonary:      Effort: Pulmonary effort is normal. No respiratory distress. Breath sounds: Normal breath sounds. No stridor. No wheezing, rhonchi or rales. Comments: Diminished in bilateral bases  Chest:      Chest wall: No tenderness. Abdominal:      General: Bowel sounds are normal. There is no distension. Palpations: Abdomen is soft. Tenderness: There is no abdominal tenderness. Musculoskeletal:         General: No swelling. Right lower leg: Edema present. Left lower leg: Edema (+3 edema to knee, +1 edema posterior thighs) present. Skin:     General: Skin is warm and dry. Capillary Refill: Capillary refill takes less than 2 seconds. Coloration: Skin is not jaundiced or pale. Findings: No rash. Neurological:      General: No focal deficit present. Mental Status: She is alert and oriented to person, place, and time. Cranial Nerves: No cranial nerve deficit. Sensory: No sensory deficit. Coordination: Coordination normal.   Psychiatric:         Mood and Affect: Mood normal.         Behavior: Behavior normal.         Thought Content: Thought content normal.         Judgment: Judgment normal.       Vitals:    05/03/22 1232   BP: 124/80   Site: Left Upper Arm   Position: Sitting   Cuff Size: Large Adult   Pulse: 78   Resp: 16   Temp: 96.8 °F (36 °C)   TempSrc: Temporal   SpO2: 95%   Weight: 184 lb 9.6 oz (83.7 kg)   Height: 5' 5\" (1.651 m)       Assessment:  1. Cellulitis of left lower extremity  Continue on antibiotic until completed, ongoing follow-up    2. CHF (congestive heart failure), NYHA class I, acute on chronic, combined ([address]  She needs to increase her Lasix as directed. 2 gSodium diet, she needs to start weighing herself daily this was discussed with patient and daughter. 3. Atrial fibrillation, unspecified type ([address]  Stable at present, declines anticoagulation, aware of risk, continues on metoprolol. Follows with cardiology    4. Moderate mitral valve regurgitation  5. Moderate tricuspid regurgitation  Patient with persistent swelling to lower extremities. Following with cardiology    6. Discoloration of skin of foot  Seen by vascular. Felt this was due to heart failure uncontrolled. Patient was given extra diuretics. We will follow-up with her here in 2 weeks. Plan:  As noted above. Follow up for routine visit. Call sooner with concerns prior.     Electronically signed by JESSICA Godwin CNP on 5/3/2022 at 2:45 PM

## 2022-05-27 NOTE — TELEPHONE ENCOUNTER
Verified with daughter. ..... Edwina Josue Will need more thyroid medication to be sent to [facility] today.

## 2022-06-06 NOTE — PROGRESS NOTES
06/06/22  Kai Hernandez  1935      Chief Complaint:   1. Wound of foot    2. Atrial fibrillation, unspecified type (HCC)    3. Edema, unspecified type    4. Acute combined systolic (congestive) and diastolic (congestive) heart failure ([facility] [address]     5. Hypothyroidism, unspecified type    6. Moderate mitral valve regurgitation        HPI:  16-year-old patient with history of mitral valve regurgitation, hypothyroidism, CHF, A. fib being seen at the request of her daughter as she has a persistent wound to her left foot. States they are currently putting a nonadherent dressing. Her legs are increasing with edema. Daughter states they wrapped for a day or 2 and then let unwrapped for a day or 2. Patient states she feels that she is getting further abdominal distention. Knows her legs are tighter. Declines increasing fluid intake or sodium intake. States she has been taking her meds as directed. Using her water pill daily. She is not on any anticoagulation for the atrial A. fib as she has previously declined this. Aware of the risk. She did get her thyroid ultrasound completed today with a thyroid study. Awaiting results. She denies any chest pain no heart palpitations no orthopnea.       No Known Allergies    Past Medical History:   Diagnosis Date    Atrial fibrillation (HCC)     CAD (coronary artery disease)     CHF (congestive heart failure) (HCC)     Edema     Empyema (HCC)     Pleural effusion        Past Surgical History:   Procedure Laterality Date    CARDIOVERSION  02/10/2022       Current Outpatient Medications on File Prior to Visit   Medication Sig Dispense Refill    levothyroxine (SYNTHROID) 25 MCG tablet Take 1.5 tablets by mouth Daily 135 tablet 0    potassium chloride (KLOR-CON M) 10 MEQ extended release tablet Take 1 tablet by mouth daily 90 tablet 3    furosemide (LASIX) 40 MG tablet Take 1 tablet by mouth daily 90 tablet 5    metoprolol tartrate (LOPRESSOR) 25 MG tablet Take 1 tablet by mouth 2 times daily 180 tablet 5    aspirin 81 MG EC tablet Take 81 mg by mouth daily      GARLIC PO Take by mouth daily      miconazole (MICOTIN) 2 % powder Apply topically 2 times daily to under breasts 45 g 1    Multiple Vitamins-Minerals (CENTRUM SILVER PO) Take 1 tablet by mouth daily      Omega-3 Fatty Acids (FISH OIL PO) Take 1 tablet by mouth daily       No current facility-administered medications on file prior to visit. Social History     Socioeconomic History    Marital status:      Spouse name: Not on file    Number of children: Not on file    Years of education: Not on file    Highest education level: Not on file   Occupational History    Not on file   Tobacco Use    Smoking status: Never Smoker    Smokeless tobacco: Never Used   Substance and Sexual Activity    Alcohol use: Not Currently    Drug use: Never    Sexual activity: Not on file   Other Topics Concern    Not on file   Social History Narrative    Not on file     Social Determinants of Health     Financial Resource Strain: Low Risk     Difficulty of Paying Living Expenses: Not hard at all   Food Insecurity: No Food Insecurity    Worried About Running Out of Food in the Last Year: Never true    [address] in the Last Year: Never true   Transportation Needs:     Lack of Transportation (Medical): Not on file    Lack of Transportation (Non-Medical):  Not on file   Physical Activity: Inactive    Days of Exercise per Week: 0 days    Minutes of Exercise per Session: 0 min   Stress:     Feeling of Stress : Not on file   Social Connections:     Frequency of Communication with Friends and Family: Not on file    Frequency of Social Gatherings with Friends and Family: Not on file    Attends [employer] Services: Not on file    Active Member of Clubs or Organizations: Not on file    Attends Club or Organization Meetings: Not on file    Marital Status: Not on file   Intimate Partner Violence:     Fear of Current or Ex-Partner: Not on file    Emotionally Abused: Not on file    Physically Abused: Not on file    Sexually Abused: Not on file   Housing Stability:     Unable to Pay for Housing in the Last Year: Not on file    Number of Places Lived in the Last Year: Not on file    Unstable Housing in the Last Year: Not on file       Review of Systems   Cardiovascular: Positive for leg swelling. Skin: Positive for wound. All other systems reviewed and are negative. Physical Exam  Vitals and nursing note reviewed. Constitutional:       General: She is not in acute distress. Appearance: Normal appearance. She is well-developed. She is not diaphoretic. HENT:      Head: Normocephalic and atraumatic. Right Ear: External ear normal.      Left Ear: External ear normal.   Eyes:      General:         Right eye: No discharge. Left eye: No discharge. Neck:      Trachea: No tracheal deviation. Cardiovascular:      Rate and Rhythm: Normal rate and regular rhythm. Heart sounds: Normal heart sounds. No murmur heard. No friction rub. No gallop. Pulmonary:      Effort: Pulmonary effort is normal. No respiratory distress. Breath sounds: Normal breath sounds. No stridor. No wheezing, rhonchi or rales. Chest:      Chest wall: No tenderness. Abdominal:      General: Bowel sounds are normal. There is distension (Slightly distended). Palpations: Abdomen is soft. Tenderness: There is no abdominal tenderness. Musculoskeletal:         General: No swelling. Right lower leg: Edema present. Left lower leg: No edema (+4 edema to bilateral thighs). Feet:    Skin:     General: Skin is warm and dry. Capillary Refill: Capillary refill takes less than 2 seconds. Coloration: Skin is not jaundiced or pale. Findings: No rash. Neurological:      General: No focal deficit present. Mental Status: She is alert and oriented to person, place, and time. Cranial Nerves: No cranial nerve deficit. Sensory: No sensory deficit. Motor: No weakness. Coordination: Coordination normal.      Gait: Gait abnormal (In wheelchair). Psychiatric:         Mood and Affect: Mood normal.         Behavior: Behavior normal.         Thought Content: Thought content normal.         Judgment: Judgment normal.       Vitals:    06/06/22 1540   BP: 124/76   Site: Left Upper Arm   Position: Sitting   Cuff Size: Large Adult   Pulse: 80   Resp: 16   Temp: 98 °F (36.7 °C)       Assessment:  1. Wound of foot  Cleanse with mild soap and water daily, nonadherent pad changed twice a day  - Culture, Aerobic; Future    2. Atrial fibrillation, unspecified type ([address]  Continues on metoprolol, rate controlled, no anticoagulation per patient's request, aware of risk  - EKG 12 lead    3. Edema, unspecified type  Labs today  - Brain Natriuretic Peptide; Future  - Basic Metabolic Panel; Future    4. Acute combined systolic (congestive) and diastolic (congestive) heart failure ([address]   Stat labs today declines need for admission wants to try to treat outpatient  - Brain Natriuretic Peptide; Future    5. Hypothyroidism, unspecified type  Awaiting thyroid ultrasound and TSH    6. Moderate mitral valve regurgitation  Patient continues to follow with cardiology. Appears fluid overload will await labs probable adjustment in diuretics      Plan:  As noted above. Follow up for routine visit. Call sooner with concerns prior.     Electronically signed by JESSICA Baptiste CNP on 6/6/2022 at 4:27 PM

## 2022-06-08 NOTE — TELEPHONE ENCOUNTER
If they should be if she has been missing doses then I would suggest keeping her on the current dose and checking a TSH in 12 weeks.

## 2022-06-08 NOTE — TELEPHONE ENCOUNTER
Patient's daughter, Heidi Luz states that patient is probably missing doses of her levothyroxine. Heidi Luz states the patient gets defensive when asked if she is taking her medication regularly because she is \"still getting the hang of it. \" Heidi Luz states that the patient has finally agreed to allow her to set up a pill box for her so that she can better monitor. The box is currently set up with current dose of 1.5 tablets daily (37.5mcg). Please advise. Patient and family in agreement for patient to see endocrinology.  Heidi Luz asked this writer to contact her brother, Jessica Lomas regarding location to send referral.

## 2022-06-08 NOTE — TELEPHONE ENCOUNTER
Patient's daughter, Rob Means notified. Lab order entered.     Left message for patient's son, Jakob Mueller to return call regarding endo referral.

## 2022-06-09 NOTE — TELEPHONE ENCOUNTER
Second attempt to contact patient's son, Aldair Becerril regarding endo referral to discuss preferred location.

## 2022-06-13 NOTE — TELEPHONE ENCOUNTER
From: Jessica Hoyos  To: Teo Mom  Sent: 6/13/2022 8:19 AM EDT  Subject: update mom Marija Chavez    She says she's lost 2 lbs. Is taking all her medications seemingly correctly as I have them in a pill box already placed. lower legs have been unwrapped and rewrapped everyday after cleaning off with warm soapy water and dapping dry and then allowing to air dry for about an hour and a half. Her 2 foot wounds are about the same but less drainage sense cleaning everyday with the exception of her bunion sore. I change to a lighter eddie pad and evidently it rubbed and my brother had to come down and do some change out and rewrap so am getting bunion pads today to use before wrapping. I was completely caught off guard because Sunday she apologized for eating too much (as she barely eats enough to get a bird full) and was insistent she was trying to loose weight then saw she read the BMI information on her last apt read out that said decrease calorie intake and become more active. I said yours is access fluid but she's convinced I must have misunderstood because it was the instructions given by her doctor. I would say she's held her own and hasn't lost ground but no big improvement.    Daughter, Bibi Hanna  will await instructions on her care for now till you see her on the 30th of June

## 2022-06-13 NOTE — TELEPHONE ENCOUNTER
Daughter notified. She is asking if her mother is to continue Lasix 40mg in a.m and 20mg at 1:00 pm? They have done that x5 days.

## 2022-06-13 NOTE — TELEPHONE ENCOUNTER
The BMI handout is an automated print out- I would have her make sure she is getting enough protein avoiding excessive sweats.  Glad to see she has lost some weight/ fluid with the medication adjustments

## 2022-06-14 NOTE — TELEPHONE ENCOUNTER
Spoke with son. Leg swelling has decreased due to the wraps he applies. Abdomen is about the same. She isnt complaining of being uncomfortable. Per April Reardon, continue the extra Lasix and recheck a BMP IN 2 days.

## 2022-06-17 NOTE — TELEPHONE ENCOUNTER
From: Shea Rivera  To: Marian Domingo  Sent: 6/17/2022 9:15 AM EDT  Subject: mom's wounds on feet     I know Genet Martins is off Fridays but wanted to get message in. As you know mom has 2 areas of which my brother & myself are concerned and we are trying very hard to manage them. She has been unwrapping her legs because she is saying the extra \"pressure\" is causing pain. The bunion area on her Right foot is ulcerated in appearance and the sore on her left foot is always open and oozes. She seems to be taking all her meds (antibiotic included) Would seeing a wound specialist be able to help with this or is it something you can really take notice and help us get this going in the right direction again. will keep trying to continue routine of unwrapping both legs, cleaning gently, dap dry, allow to air dry an hour or two, apply thin layer antibiotic, apply eddie pad and rewrap. ...    until she un-wraps she says about 2-3 a.m.   her next apts are June 30 with you and her heart dr Wilmer Mcguire again daughter Rob Means

## 2022-06-27 PROBLEM — L97.921 ULCER OF LEFT LOWER LEG, LIMITED TO BREAKDOWN OF SKIN (HCC): Status: ACTIVE | Noted: 2022-01-01

## 2022-06-27 PROBLEM — L97.511 ULCER OF RIGHT FOOT, LIMITED TO BREAKDOWN OF SKIN (HCC): Status: ACTIVE | Noted: 2022-01-01

## 2022-06-27 PROBLEM — I89.0 LYMPHEDEMA OF BOTH LOWER EXTREMITIES: Status: ACTIVE | Noted: 2022-01-01

## 2022-06-27 NOTE — PROGRESS NOTES
Subjective:    Christian Powell is a 81 y.o. female who presents with the ulceration of the feet and legs. Started quite some time ago. Wounds appear to be coming off and on. They have tried wrapping the legs at home with Ace wraps. . Patient has not been compliant with compression therapy. Family states she has not tolerated the compression stockings at home. Patient relates pain is Present. Pain is rated 2 out of 10 and is described as intermittent. Currently denies F/C/N/V    No Known Allergies    Past Medical History:   Diagnosis Date    Atrial fibrillation (HCC)     CAD (coronary artery disease)     CHF (congestive heart failure) (HCC)     Edema     Empyema (HCC)     Pleural effusion        Prior to Admission medications    Medication Sig Start Date End Date Taking?  Authorizing Provider   furosemide (LASIX) 20 MG tablet Take 40 mg in am and 20 mg at 2 pm. 6/22/22   JESSICA Clarke CNP   furosemide (LASIX) 40 MG tablet 40 mg in  am and 20 mg at 2 pm 6/20/22   JESSICA Clarke - CNP   levothyroxine (SYNTHROID) 25 MCG tablet Take 1.5 tablets by mouth Daily 5/31/22   JESSICA Clarke - CNP   potassium chloride (KLOR-CON M) 10 MEQ extended release tablet Take 1 tablet by mouth daily 4/18/22 4/13/23  JESSICA Clarke CNP   metoprolol tartrate (LOPRESSOR) 25 MG tablet Take 1 tablet by mouth 2 times daily 4/18/22   JESSICA Clarke - CNP   aspirin 81 MG EC tablet Take 81 mg by mouth daily    Historical Provider, MD   GARLIC PO Take by mouth daily    Historical Provider, MD   miconazole (MICOTIN) 2 % powder Apply topically 2 times daily to under breasts 2/10/22   Scott Rubi APRN - NP   Multiple Vitamins-Minerals (CENTRUM SILVER PO) Take 1 tablet by mouth daily    Historical Provider, MD   Omega-3 Fatty Acids (FISH OIL PO) Take 1 tablet by mouth daily    Historical Provider, MD       Social History     Tobacco Use    Smoking status: Never Smoker    Smokeless tobacco: Never Used   Substance Use Topics    Alcohol use: Not Currently       Review of Systems: All 12 systems reviewed and pertinent positives noted above. Lower Extremity Physical Examination:     Vitals:   Vitals:    06/27/22 1400   BP: 116/80   Pulse: 76   Resp: 18   Temp: 97.8 °F (36.6 °C)     General: AAO x 3 in NAD. Vascular: DP and PT pulses palpable 2/4, bilateral.  CFT <3 seconds, bilateral.  Hair growth absent to the level of the digits, bilateral.  Edema present, bilateral.  Varicosities present, bilateral. Erythema absent, bilateral. Distal Rubor absent bilateral.  Temperature decreased bilateral. Hyperpigmentation present bilateral. Atrophic skin yes. Neurological: Sensation intact to light touch to level of digits, bilateral.  Protective sensation intact 10/10 sites via 5.07/10g [place] Monofilament, bilateral.  negative Tinel's, bilateral.  negative Valleix sign, bilateral.  Vibratory intact bilateral.  Reflexes Decreased bilateral.  Paresthesias negative. Dysthesias negative. Sharp/dull intact bilateral.    Musculoskeletal: Muscle strength 5/5, bilateral.  Pain absent upon palpation bilateral. Normal medial longitudinal arch, bilateral.  Ankle ROM decreased,bilateral.  1st MPJ ROM within normal limits, bilateral.  Dorsally contracted digits absent. Mild bunion    Integument:   Open lesion present, Bilateral.  Area left second third fourth webspace but no active ulcer. Anterior left leg is superficial ulcer positive serous drainage with healthy tissue base no signs of infection on periphery. Right medial first metatarsal head there is a superficial ulcer with a healthy tissue base no signs infection on periphery. No probing undermining no malodor or proximal streaking. Interdigital maceration present to web spaces 234, Left. Nails b/l  thickened, dystrophic and crumbly, discolored with subungual debris. Fissures absent, Bilateral. Hyperkeratotic tissue is absent. Wound 06/27/22 Foot Anterior;Right #1 left foot (Active)   Number of days: 0       Wound 06/27/22 Pretibial Left #2 left leg (Active)   Number of days: 0         Asessment: Patient is a 81 y.o. female with:   Hospital Problems           Last Modified POA    Lymphedema of both lower extremities 6/27/2022 Yes    Ulcer of left lower leg, limited to breakdown of skin ([address] 6/27/2022 Yes    Ulcer of right foot, limited to breakdown of skin ([address] 6/27/2022 Yes        Ulcer Classification:  Venous insufficiency ulcer grade 1 C. IDSA  no infection. Plan:   Patient examined and evaluated. Current condition and treatment options discussed in detail. Topical lidocaine applied to wound. Debridement none needed. Patient advised importance of compression therapy and elevation of the leg and stressed the importance of this in regards to wound healing. Compression:Patient had an unna boot and coban applied to the Bilateral lower extremities. This was applied for control of leg edema. Patient educated on appropriate use and will keep the dressing dry and intact. Any increase in pain or if the dressing should fall down or slip, they should contact the clinic immediately. Also advised importance of continued elevation of the leg. Patient is low level medical decision making overall. Patient is acute on copy condition along with stable chronic condition. No new testing no new prescription. Lower extremity at this time since ulcerations are noninfected. Today's dressing:foam.  Betadine 3 times daily to left second third fourth web spaces. For the lymphedema issue, pt should perform regular exercise, elevate his legs, and continue regular use of compression stockings long term. Contact clinic with any questions/problems/concerns or new signs of infection. Follow-up at [facility] in 1week(s).

## 2022-06-29 PROBLEM — R09.02 HYPOXIA: Status: ACTIVE | Noted: 2022-01-01

## 2022-06-29 PROBLEM — R79.89 ELEVATED LACTIC ACID LEVEL: Status: ACTIVE | Noted: 2022-01-01

## 2022-06-29 PROBLEM — N30.00 ACUTE CYSTITIS WITHOUT HEMATURIA: Status: ACTIVE | Noted: 2022-01-01

## 2022-06-29 PROBLEM — L03.116 BILATERAL LOWER LEG CELLULITIS: Status: ACTIVE | Noted: 2022-01-01

## 2022-06-29 PROBLEM — L03.115 BILATERAL LOWER LEG CELLULITIS: Status: ACTIVE | Noted: 2022-01-01

## 2022-06-29 PROBLEM — R41.82 ALTERED MENTAL STATUS: Status: ACTIVE | Noted: 2022-01-01

## 2022-06-29 PROBLEM — L03.90 CELLULITIS: Status: ACTIVE | Noted: 2022-01-01

## 2022-06-29 NOTE — ED PROVIDER NOTES
[id number] [facility]  eMERGENCY dEPARTMENT eNCOUnter      Pt Name: Chula Bright  MRN: [id number]  [place] 1935  Date of evaluation: 6/29/2022      CHIEF COMPLAINT       Chief Complaint   Patient presents with    Other         HISTORY OF PRESENT ILLNESS    Chula Bright is a 81 y.o. female who presents chief complaint of altered mental status, she lives with family family found her on the floor this morning she was sleeping next to her bed she says she just slid out of bed unknown how long she been on the floor then she seemed more confused today she they found her urinating in a trash can is going to the bathroom patient says she is on a water pill when she is got ago she is got ago. She denies any pain she has chronic edema to both lower extremities with wounds and is seen by podiatry and wound and wound care family says about the way they her legs are currently recently wrapped. There is been no fevers or chills but she has had more shortness of breath with exertion  Has a history of atrial fib is on aspirin and also on Lasix    REVIEW OF SYSTEMS         Review of Systems   Constitutional: Positive for fatigue. Negative for chills and fever. HENT: Negative for congestion and ear pain. Eyes: Negative for pain and visual disturbance. Respiratory: Negative for cough and shortness of breath. Cardiovascular: Positive for leg swelling. Negative for chest pain and palpitations. Gastrointestinal: Negative for abdominal pain, blood in stool, constipation, diarrhea, nausea and vomiting. Endocrine: Negative for polydipsia and polyuria. Genitourinary: Positive for frequency. Negative for difficulty urinating and dysuria. Musculoskeletal: Negative for back pain, joint swelling, myalgias, neck pain and neck stiffness. Chronic edema to both lower extremities with wounds   Skin: Negative for rash. Neurological: Negative for dizziness, weakness and headaches.    Hematological: Negative for adenopathy. Does not bruise/bleed easily. Psychiatric/Behavioral: Positive for confusion. Negative for self-injury and suicidal ideas. PAST MEDICAL HISTORY    has a past medical history of Atrial fibrillation ([facility] [address] CAD (coronary artery disease), CHF (congestive heart failure) ([facility] [address] Edema, Empyema ([facility] [address] and Pleural effusion. SURGICAL HISTORY      has a past surgical history that includes Cardioversion (02/10/2022). CURRENT MEDICATIONS       Discharge Medication List as of 7/3/2022 10:50 AM      CONTINUE these medications which have NOT CHANGED    Details   furosemide (LASIX) 20 MG tablet Take 40 mg in am and 20 mg at 2 pm., Disp-90 tablet, R-3Normal      levothyroxine (SYNTHROID) 25 MCG tablet Take 1.5 tablets by mouth Daily, Disp-135 tablet, R-0Normal      potassium chloride (KLOR-CON M) 10 MEQ extended release tablet Take 1 tablet by mouth daily, Disp-90 tablet, R-3Normal      aspirin 81 MG EC tablet Take 81 mg by mouth dailyHistorical Med      GARLIC PO Take by mouth dailyHistorical Med      miconazole (MICOTIN) 2 % powder Apply topically 2 times daily to under breasts, Disp-45 g, R-1, Print      Multiple Vitamins-Minerals (CENTRUM SILVER PO) Take 1 tablet by mouth dailyHistorical Med      Omega-3 Fatty Acids (FISH OIL PO) Take 1 tablet by mouth dailyHistorical Med             ALLERGIES     has No Known Allergies. FAMILY HISTORY     She indicated that the status of her sister is unknown. She indicated that the status of her brother is unknown.     family history includes Cancer in her brother and sister. SOCIAL HISTORY      reports that she has never smoked. She has never used smokeless tobacco. She reports previous alcohol use. She reports that she does not use drugs. PHYSICAL EXAM     INITIAL VITALS:  height is 5' 5\" (1.651 m) and weight is 195 lb 11.2 oz (88.8 kg). Her tympanic temperature is 98.7 °F (37.1 °C). Her blood pressure is 94/55 (abnormal) and her pulse is 84.  Her respiration is 18 and oxygen saturation is 94%. Physical Exam  Constitutional:       General: She is not in acute distress. Appearance: Normal appearance. She is well-developed. She is not ill-appearing, toxic-appearing or diaphoretic. HENT:      Head: Normocephalic and atraumatic. Nose: Nose normal.   Eyes:      Extraocular Movements: Extraocular movements intact. Conjunctiva/sclera: Conjunctivae normal.      Pupils: Pupils are equal, round, and reactive to light. Cardiovascular:      Rate and Rhythm: Normal rate and regular rhythm. Pulmonary:      Effort: Pulmonary effort is normal.      Breath sounds: Normal breath sounds. Abdominal:      General: Bowel sounds are normal.      Palpations: Abdomen is soft. Musculoskeletal:         General: No tenderness. Normal range of motion. Cervical back: Normal range of motion. Right lower leg: Edema present. Left lower leg: Edema present. Comments: Patient has compression dressings on both lower extremities   Skin:     General: Skin is warm and dry. Neurological:      General: No focal deficit present. Mental Status: She is alert and oriented to person, place, and time.    Psychiatric:         Behavior: Behavior normal.           DIFFERENTIAL DIAGNOSIS/ MDM:     Caution with family says that over the last couple weeks has had a gradual decline in her functioning and she lives at home and today has been the worst.    DIAGNOSTIC RESULTS     EKG: All EKG's are interpreted by the Emergency Department Physician who either signs or Co-signs this chart in the absence of a cardiologist.  Atrial fibrillation rate of 80 bpm QRS durations 84 ms QT corrected 468 ms axis is 81 there is no acute ST or T wave changes seen      RADIOLOGY:   I directly visualized the following  images and reviewed the radiologist interpretations:       EXAMINATION:   ONE XRAY VIEW OF THE CHEST       6/29/2022 5:13 pm       COMPARISON:   02/11/2022 and the most recent study of 04/27/2022       HISTORY:   ORDERING SYSTEM PROVIDED HISTORY: shortness of breath   TECHNOLOGIST PROVIDED HISTORY:   shortness of breath   Reason for Exam: Increased confusion, fatigue       FINDINGS:   There is a large right pleural effusion opacifying the inferior 2/3rds of the   right hemithorax not significantly changed in size from 04/27/2022.  Right   lower lung opacity, likely compressive atelectasis.  There is unchanged mild   blunting of the left costophrenic angle.  Lungs are otherwise clear.  No   pneumothorax.  Heart size is unchanged.  No acute bone finding.           Impression   Unchanged large right pleural effusion opacifying the inferior 2/3rds of the   right hemithorax.  Right lower lung compressive atelectasis.  Clinical   correlation and ultrasound-guided thoracentesis would be helpful.                EXAMINATION:   CT OF THE HEAD WITHOUT CONTRAST  6/29/2022 5:33 pm       TECHNIQUE:   CT of the head was performed without the administration of intravenous   contrast. Automated exposure control, iterative reconstruction, and/or weight   based adjustment of the mA/kV was utilized to reduce the radiation dose to as   low as reasonably achievable.       COMPARISON:   04/07/2020       HISTORY:   ORDERING SYSTEM PROVIDED HISTORY: Fall   TECHNOLOGIST PROVIDED HISTORY:       Fall   Decision Support Exception - unselect if not a suspected or confirmed   emergency medical condition->Emergency Medical Condition (MA)   Reason for Exam: fall, increased confusion       FINDINGS:   BRAIN/VENTRICLES: There is mild age-appropriate generalized atrophy.  There   is no acute intracranial hemorrhage, mass effect or midline shift.  No   abnormal extra-axial fluid collection.  The gray-white differentiation is   maintained without evidence of an acute infarct.  There is no evidence of   hydrocephalus.       ORBITS: The visualized portion of the orbits demonstrate no acute abnormality.     SINUSES: The visualized paranasal sinuses and mastoid air cells demonstrate   no acute abnormality.       SOFT TISSUES/SKULL:  No acute abnormality of the visualized skull or soft   tissues.           Impression   No acute intracranial abnormality.            EXAMINATION:   CT OF THE CERVICAL SPINE WITHOUT CONTRAST 6/29/2022 2:33 pm       TECHNIQUE:   CT of the cervical spine was performed without the administration of   intravenous contrast. Multiplanar reformatted images are provided for review.    Automated exposure control, iterative reconstruction, and/or weight based   adjustment of the mA/kV was utilized to reduce the radiation dose to as low   as reasonably achievable.       COMPARISON:   Concurrent head CT, CT C-spine 04/07/2020       HISTORY:   ORDERING SYSTEM PROVIDED HISTORY: Fall   TECHNOLOGIST PROVIDED HISTORY:   Fall   Decision Support Exception - unselect if not a suspected or confirmed   emergency medical condition->Emergency Medical Condition (MA)   Reason for Exam: fall, increased conufsion       FINDINGS:   BONES/ALIGNMENT: There is no acute fracture or traumatic malalignment with   unchanged 2 mm grade 1 degenerative anterolisthesis at C7 on T1.       DEGENERATIVE CHANGES: Multilevel disc height loss which is greatest and   moderate at C5-C6 and otherwise mild.  Uncovertebral spurring predominates at   C5-C6.  There is multilevel facet arthrosis.  Only minimal degenerative   spinal canal stenosis.  Moderate left C5-C6 osseous neuroforaminal narrowing.       SOFT TISSUES: There is no prevertebral soft tissue swelling.  Large water   attenuation right pleural effusion with associated atelectasis in the imaged   portion of the right lung apex, new from prior comparison CT.  Normal variant   retropharyngeal course of the left carotid.  Heavy calcifications at the   carotid bifurcations.           Impression   No acute fracture of the cervical spine.       Partially imaged large right pleural effusion.                 ED BEDSIDE ULTRASOUND:       LABS:  Labs Reviewed   CULTURE, AEROBIC - Abnormal; Notable for the following components:       Result Value    Direct Exam RARE NEUTROPHILS (*)     Direct Exam MODERATE GRAM NEGATIVE RODS (*)     Culture PSEUDOMONAS AERUGINOSA MODERATE GROWTH (*)     Culture ENTEROBACTER CLOACAE COMPLEX MODERATE GROWTH (*)     All other components within normal limits   CULTURE, BODY FLUID - Abnormal; Notable for the following components:    Direct Exam MANY NEUTROPHILS (*)     All other components within normal limits   BRAIN NATRIURETIC PEPTIDE - Abnormal; Notable for the following components:    Pro-BNP 4,743 (*)     All other components within normal limits   TROPONIN - Abnormal; Notable for the following components:    Troponin, High Sensitivity 40 (*)     All other components within normal limits   CBC WITH AUTO DIFFERENTIAL - Abnormal; Notable for the following components:    Hematocrit 47.2 (*)     RDW 18.0 (*)     Seg Neutrophils 79 (*)     Lymphocytes 11 (*)     Eosinophils % 0 (*)     Absolute Lymph # 0.77 (*)     All other components within normal limits   COMPREHENSIVE METABOLIC PANEL W/ REFLEX TO MG FOR LOW K - Abnormal; Notable for the following components:    Glucose 102 (*)     BUN 30 (*)     Bun/Cre Ratio 34 (*)     Chloride 97 (*)     Alkaline Phosphatase 152 (*)     AST 66 (*)     Total Bilirubin 3.16 (*)     All other components within normal limits   LACTATE, SEPSIS - Abnormal; Notable for the following components:    Lactic Acid, Sepsis 2.3 (*)     All other components within normal limits   LACTATE, SEPSIS - Abnormal; Notable for the following components:    Lactic Acid, Sepsis 2.1 (*)     All other components within normal limits   MYOGLOBIN, SERUM - Abnormal; Notable for the following components:    Myoglobin 186 (*)     All other components within normal limits   TROPONIN - Abnormal; Notable for the following components:    Troponin, High Sensitivity CBC WITH AUTO DIFFERENTIAL - Abnormal; Notable for the following components:    RDW 18.1 (*)     Seg Neutrophils 85 (*)     Lymphocytes 8 (*)     Eosinophils % 0 (*)     Absolute Lymph # 0.77 (*)     All other components within normal limits   CBC WITH AUTO DIFFERENTIAL - Abnormal; Notable for the following components:    RDW 17.7 (*)     Seg Neutrophils 73 (*)     Lymphocytes 14 (*)     Absolute Lymph # 0.88 (*)     All other components within normal limits   BASIC METABOLIC PANEL - Abnormal; Notable for the following components:    Glucose 103 (*)     BUN 33 (*)     CREATININE 1.18 (*)     Bun/Cre Ratio 28 (*)     CO2 33 (*)     Anion Gap 8 (*)     GFR Non- 43 (*)     GFR  53 (*)     All other components within normal limits   CBC WITH AUTO DIFFERENTIAL - Abnormal; Notable for the following components:    RDW 17.8 (*)     Seg Neutrophils 74 (*)     Lymphocytes 12 (*)     Absolute Lymph # 0.83 (*)     All other components within normal limits   BASIC METABOLIC PANEL - Abnormal; Notable for the following components:    BUN 28 (*)     CREATININE 0.94 (*)     Bun/Cre Ratio 30 (*)     CO2 32 (*)     Anion Gap 7 (*)     GFR Non- 56 (*)     All other components within normal limits   CULTURE, BLOOD 1   CULTURE, BLOOD 1   COVID-19, RAPID   CULTURE WITH SMEAR, ACID FAST BACILLIUS   URINALYSIS WITH REFLEX TO CULTURE   AMMONIA   LACTIC ACID   APTT   PLATELET COUNT   GLUCOSE, BODY FLUID   LACTATE DEHYDROGENASE, BODY FLUID   PH, BODY FLUID   PROTEIN, BODY FLUID   AMYLASE   CHOLESTEROL, BODY FLUID   AMYLASE, BODY FLUID   CYTOLOGY, NON-GYN   SURGICAL PATHOLOGY REPORT           EMERGENCY DEPARTMENT COURSE:   Vitals:    Vitals:    07/02/22 2304 07/03/22 0601 07/03/22 0841 07/03/22 0902   BP: 115/73 124/69 (!) 94/55    Pulse: 88 91 84    Resp: 18 18     Temp: 97.8 °F (36.6 °C) 98.7 °F (37.1 °C)     TempSrc: Tympanic Tympanic     SpO2: 95% 97%  94%   Weight:  195 lb 11.2 oz (88.8 kg) Height:         -------------------------  BP: (!) 94/55, Temp: 98.7 °F (37.1 °C), Heart Rate: 84, Resp: 18        Re-evaluation Notes      CRITICAL CARE:   IP CONSULT TO CASE MANAGEMENT  IP CONSULT TO INTERVENTIONAL RADIOLOGY  IP CONSULT TO CARDIOLOGY  IP CONSULT TO PODIATRY  IP CONSULT TO HEART FAILURE NURSE/COORDINATOR        CONSULTS:      PROCEDURES:  None    FINAL IMPRESSION      1. Altered mental status, unspecified altered mental status type    2. Hypoxia    3. Pleural effusion    4. Cellulitis of lower extremity, unspecified laterality    5. CHF (congestive heart failure), NYHA class I, acute on chronic, combined ([facility] [address]    6. Atrial fibrillation, unspecified type ([facility] [address]    7. Pleural effusion on right    8. Lymphedema of both lower extremities          DISPOSITION/PLAN   DISPOSITION care of this patient is passed to the oncoming physician pending results    Condition on Disposition  stable    PATIENT REFERRED TO:  No follow-up provider specified. DISCHARGE MEDICATIONS:  Discharge Medication List as of 7/3/2022 10:50 AM      START taking these medications    Details   ciprofloxacin (CIPRO) 500 MG tablet Take 1 tablet by mouth 2 times daily for 10 days, Disp-20 tablet, R-0Normal             (Please note that portions of this note were completed with a voice recognition program.  Efforts were made to edit the dictations but occasionally words are mis-transcribed.)    Keisha Hart MD,, MD, F.A.A.E.M.   Attending Emergency Physician                          Keisha Hart MD  06/29/22 [id number]       Keisha Hart MD  07/13/22 9739

## 2022-06-29 NOTE — TELEPHONE ENCOUNTER
Advise prompt ER eval per Carolyn Santana. Patient's daughter, Mart Sears notified and voiced understanding.

## 2022-06-30 NOTE — CONSULTS
[facility]  CONSULT NOTE                  Date:   6/30/2022  Patient name: Shanthi Nelson  Date of admission:  6/29/2022  4:59 PM  MRN:   [id number]  YOB: 1935    Reason for Admission: Hypoxia, Atrial fib     CHIEF COMPLAINT:   Altered mentation     History Obtained From:  Patient and chart review     HISTORY OF PRESENT ILLNESS:    52-year-old female with known cardiac history of persistent atrial fibrillation admitted with c/o altered mental status per sons report. Patient was found on the floor next to her bed, patient reports she slid out of bed. Patient denied chest pain, dyspnea, fever or chills. Chest x-ray showed unchanged large right pleural effusion with compressive atelectasis. CT head was negative for any acute intracranial abnormalities. Also found to have bilateral lower extremity cellulitis and acute cystitis and started on IV antibiotics. This morning she is stable. Remains in atrial fibrillation with heart rate well controlled ranging 60-70. No significant volume overload on examination. Past Medical History:   has a past medical history of Atrial fibrillation ([address] CAD (coronary artery disease), CHF (congestive heart failure) ([address] Edema, Empyema ([address] and Pleural effusion. Past Surgical History:   has a past surgical history that includes Cardioversion (02/10/2022). Home Medications:    Prior to Admission medications    Medication Sig Start Date End Date Taking?  Authorizing Provider   furosemide (LASIX) 20 MG tablet Take 40 mg in am and 20 mg at 2 pm. 6/22/22   JESSICA Galeano CNP   furosemide (LASIX) 40 MG tablet 40 mg in  am and 20 mg at 2 pm 6/20/22   JESSICA Galeano CNP   levothyroxine (SYNTHROID) 25 MCG tablet Take 1.5 tablets by mouth Daily 5/31/22   JESSICA Galeano CNP   potassium chloride (KLOR-CON M) 10 MEQ extended release tablet Take 1 tablet by mouth daily 4/18/22 4/13/23  JESSICA Galeano CNP   metoprolol tartrate (LOPRESSOR) 25 MG tablet Take 1 tablet by mouth 2 times daily 4/18/22   JESSICA Armendariz - CNP   aspirin 81 MG EC tablet Take 81 mg by mouth daily    Historical Provider, MD   GARLIC PO Take by mouth daily    Historical Provider, MD   miconazole (MICOTIN) 2 % powder Apply topically 2 times daily to under breasts 2/10/22   Iker Jeffries APRN - NP   Multiple Vitamins-Minerals (CENTRUM SILVER PO) Take 1 tablet by mouth daily    Historical Provider, MD   Omega-3 Fatty Acids (FISH OIL PO) Take 1 tablet by mouth daily    Historical Provider, MD       Allergies:  Patient has no known allergies. Social History:   reports that she has never smoked. She has never used smokeless tobacco. She reports previous alcohol use. She reports that she does not use drugs. Family History:    for early CAD    REVIEW OF SYSTEMS:    · Constitutional: there has been no unanticipated weight loss. No change in functional capacity. · Eyes: No visual changes or diplopia. · ENT: No Headaches, hearing loss or vertigo. No mouth sores or sore throat. · Cardiovascular: +ve chest pain as mentioned above, dyspnea, orthopnea, palpitations or pre syncope  · Respiratory: No hx of productive cough, pleuritic chest pain   · Gastrointestinal: No abdominal pain, appetite loss, blood in stools. No change in bowel habits. · Genitourinary: No dysuria, trouble voiding, or hematuria. · Musculoskeletal:  No gait disturbance, No weakness or joint complaints. · Integumentary: No rash or pruritis. · Neurological: No headache, weakness, numbness or tingling. No change in gait, balance, coordination. · Psychiatric: No anxiety, or depression. · Endocrine: No temperature intolerance. No excessive thirst, fluid intake, or urination. No tremor. · Hematologic/Lymphatic: No abnormal bruising or bleeding, blood clots or swollen lymph nodes. · Allergic/Immunologic: No nasal congestion or hives.     PHYSICAL EXAM:    Physical Examination:    /69   Pulse 70   Temp 97.4 °F (36.3 °C) (Tympanic)   Resp 18   Ht 5' 5\" (1.651 m)   Wt 195 lb (88.5 kg)   SpO2 97%   BMI 32.45 kg/m²    Constitutional and General Appearance: alert, cooperative, in no distress   HEENT: PERRL, no cervical lymphadenopathy. Normal oral mucosa  Respiratory:  · Normal excursion and expansion without use of accessory muscles  · Resp Auscultation: Good respiratory effort. No for increased work of breathing. On auscultation: Reduced air entry right mid to lower chest  Cardiovascular:  · The apical impulse is not displaced  · Heart tones are irregularly irregular. Murmurs: None   · Jugular venous pulsation Normal  · Thre is no carotid bruit   · Peripheral pulses are symmetrical and full   Abdomen:  · No masses or tenderness  · Bowel sounds present  Extremities:  · Erythema noted  ·  Lower extremity edema: Trace bilateral edema   ·  Skin: Warm and dry  Neurological:  · Not done     DATA:    Diagnostics:      EKG:   Atrial fibrillation, anteroseptal infarct    Previous cardiac testing:        DIANA 2/9/2022  1. A DIANA was performed without complications. 2. LVEF 50%, LVH, bi-atrial enlargement   3. Severe TR, moderate MR, mild PI  3. No thrombus or valvular vegetation identified  4. Patient successfully cardioverted after 2 shocks. Labs:     CBC:   Recent Labs     06/29/22  1709   WBC 6.7   HGB 14.9   HCT 47.2*        BMP:   Recent Labs     06/29/22  1709      K 4.6   CO2 31   BUN 30*   CREATININE 0.87   LABGLOM >60   GLUCOSE 102*     BNP: No results for input(s): BNP in the last 72 hours. PT/INR: No results for input(s): PROTIME, INR in the last 72 hours. APTT:No results for input(s): APTT in the last 72 hours. CARDIAC ENZYMES:No results for input(s): CKTOTAL, CKMB, CKMBINDEX, TROPONINI in the last 72 hours.   FASTING LIPID PANEL:  Lab Results   Component Value Date/Time    HDL 51 02/09/2022 05:20 AM    TRIG 62 02/09/2022 05:20 AM     LIVER PROFILE:  Recent Labs     06/29/22  1709   AST 66*   ALT 28   LABALBU 3.9         IMPRESSION:    1. Acute encephalopathy  2. Bilateral lower extremity cellulitis  3. Acute cystitis  4. Large right pleural effusion  5. Chronic diastolic congestive heart failure  6. Atrial fibrillation, persistent, DIANA/cardioversion 2/10/22, converted back to A-fib on 2/11/22 , now rate controlled   7. Preserved LVEF  8. Moderate mitral regurgitation  9. Severe tricuspid regurgitation  10. Biatrial enlargement  11. Hypothyroidism      RECOMMENDATIONS:    Start IV Lasix 20 mg twice daily with close monitoring of I's and O's and renal parameters  Might need right thoracentesis for diagnostic and therapeutic purposes  Continue IV antibiotics  Continue metoprolol  Patient and her family has opted against therapeutic anticoagulation per my previous discussion. She is on aspirin 81 mg daily. Discussed with patient and nursing.       Veronica Avila MD, [facility]

## 2022-06-30 NOTE — ED PROVIDER NOTES
Case signed out to me by Dr. Jerson Escobar. I have reviewed the history with the patient and family. Patient left foot out of bed this morning. Patient's son describes that she has been having some visual hallucinations she felt that there was a bunny in the room. She thought that there was somebody in the chair earlier this morning when there was not. Although should this may be related to dementia this seems to have a fairly rapid onset so there is more concern for delirium. No history of fever or leukocytosis at present. Patient was initially hypoxic. Patient has a history of remote empyema status postthoracotomy several decades ago. She has not really followed up with doctors until earlier this year. Is unclear how long this pleural effusion has been present. Patient is also seeing podiatry for wounds on her feet. There is some redness to the lower extremities. Urinalysis not convincing for acute UTI. At this point feel the patient would benefit from antibiotics for possible soft tissue infection. Case was discussed with Tori Montaño CNP on the hospital service who accepted the patient primarily. There are no beds available at present so patient will board in the emergency department. Patient remains on supplemental oxygen. Parveen Corrales MD  06/30/22 1371      Initial twelve-lead EKG reviewed. Repeat EKG timed 19: 42 atrial fibrillation with ventricular rate of 70 bpm.  Normal intervals and axis. No acute ST ovation depressions or T wave inversions to potation's atrial fibrillation.      Parveen Corrales MD  06/30/22 9422

## 2022-06-30 NOTE — PROGRESS NOTES
Physical Therapy  Facility/Department: [facility]  Physical Therapy Initial Assessment    Name: Susy Brian  : 1935  MRN: [id number]  Date of Service: 2022    Discharge Recommendations:  Continue to assess pending progress,Long Term Care with PT,Patient would benefit from continued therapy after discharge          Patient Diagnosis(es): The primary encounter diagnosis was Altered mental status, unspecified altered mental status type. Diagnoses of Hypoxia, Pleural effusion, and Cellulitis of lower extremity, unspecified laterality were also pertinent to this visit. Past Medical History:  has a past medical history of Atrial fibrillation ([facility] [address] CAD (coronary artery disease), CHF (congestive heart failure) ([facility] [address] Edema, Empyema ([facility] [address] and Pleural effusion. Past Surgical History:  has a past surgical history that includes Cardioversion (02/10/2022). Assessment   Body Structures, Functions, Activity Limitations Requiring Skilled Therapeutic Intervention: Decreased functional mobility ; Decreased ADL status; Decreased cognition;Decreased balance;Decreased strength  Therapy Prognosis: Fair  Decision Making: Low Complexity  Activity Tolerance  Activity Tolerance: Patient tolerated treatment well     Plan   Plan  Plan: 1 time a day 7 days a week  Current Treatment Recommendations: Strengthening,Balance training,Functional mobility training,Transfer training,ADL/Self-care training,Gait training,Neuromuscular re-education,Home exercise program,Safety education & training,Patient/Caregiver education & training,Therapeutic activities  Safety Devices  Type of Devices: Left in chair,Nurse notified,Call light within reach     Restrictions  Restrictions/Precautions  Restrictions/Precautions: Fall Risk,NPO     Subjective   General  Chart Reviewed: Yes  Patient assessed for rehabilitation services?: Yes  Family / Caregiver Present: Yes  Referring Practitioner: JESSICA Welch NP  Diagnosis: Contact guard assistance  Ambulation  Surface: level tile  Device: Rolling Walker  Assistance: Contact guard assistance  Quality of Gait: Forward flexed trunk  Gait Deviations: Slow Shanda;Decreased step length  Distance: 15'  Comments: Patient required Min A verbal cueing for using walker and directions     Balance  Sitting - Static: Good  Standing - Static: Good  Standing - Dynamic: Fair                 Goals  Short Term Goals  Time Frame for Short term goals: Length of stay  Short term goal 1: Patient will complete transfers with CGA  Short term goal 2: Patient will ambulate 21' with SBA and RW         Therapy Time   Individual Concurrent Group Co-treatment   Time In 1135         Time Out 1155         Minutes Ayan PT

## 2022-06-30 NOTE — CARE COORDINATION
Case Management Initial Discharge Plan  Yaneth Fink             Met with:patient and son to discuss discharge plans. Information verified: address, contacts, phone number, , and insurance: Yes  Insurance Provider: Primary:  Payor: MEDICARE / Plan: MEDICARE PART A AND B / Product Type: *No Product type* /                                         Emergency Contact/Next of Kin name & number: verified  Who are involved in patient's support system? children    PCP: JESSICA Foy CNP  Date of last visit: 22    Discharge Planning    Living Arrangements:  [address] 2 [address](son lives in 2nd story)  Location of bedroom/bathroom in home : first    Patient able to perform ADL's:Independent    Current Services (outpatient & in home) none    Is patient receiving oral anticoagulation therapy? No    Potential Assistance Needed:  N/A    Patient agreeable to home care: Yes, if needed  Freedom of choice provided:  yes    Prior SNF/Rehab Placement and Facility: no  Agreeable to SNF/Rehab: n/a  [place] of choice provided: yes      Expected Discharge date:       Patient expects to be discharged to: If home: is the family and/or caregiver wiling & able to provide support at home? yes  Who will be providing this support? children    Follow Up Appointment: Best Day/ Time:      Transportation provider: family  Transportation arrangements needed for discharge: No    Readmission Risk              Risk of Unplanned Readmission:  13             Does patient have a readmission risk score greater than 20?: No  If yes, follow-up appointment must be made within 7 days of discharge.      Goals of Care:       Educated patient and son on transitional options, provided freedom of choice and are agreeable with plan      Discharge Plan: home with potential home health          Electronically signed by India Burgos RN on 22 at 1:41 PM EDT

## 2022-06-30 NOTE — PROGRESS NOTES
rounding on PCU. Assessment: Patient just admitted last night. There is no diagnosis yet and the  believes that is causing her some concern. Intervention: Engaged in conversation and prayer. Patient expressed gratitude for visit and offer of continued prayer. Plan: Chaplains are available 24/7 to help with spiritual and emotional concerns.

## 2022-06-30 NOTE — PROGRESS NOTES
Occupational Therapy  Facility/Department: [address] [place] [address]  Occupational Therapy Initial Assessment    Name: Polly Luna  : 1935  MRN: [id number]  Date of Service: 2022    Discharge Recommendations:  Continue to assess pending progress,Long Term Care with [facility] would benefit from continued therapy after discharge       Patient Diagnosis(es): The primary encounter diagnosis was Altered mental status, unspecified altered mental status type. Diagnoses of Hypoxia, Pleural effusion, and Cellulitis of lower extremity, unspecified laterality were also pertinent to this visit. Past Medical History:  has a past medical history of Atrial fibrillation ([facility] [address] CAD (coronary artery disease), CHF (congestive heart failure) ([facility] [address] Edema, Empyema ([facility] [address] and Pleural effusion. Past Surgical History:  has a past surgical history that includes Cardioversion (02/10/2022). Treatment Diagnosis: general weakness      Assessment   Performance deficits / Impairments: Decreased functional mobility ; Decreased ADL status; Decreased safe awareness;Decreased cognition;Decreased endurance;Decreased balance;Decreased high-level IADLs;Decreased coordination  Treatment Diagnosis: general weakness  Prognosis: Good  Decision Making: Low Complexity           Plan   Plan  Times per Week: 1-2  Current Treatment Recommendations: Patient/Caregiver education & training,Equipment evaluation, education, & procurement,Self-Care / ADL,Functional mobility training,Safety education & training     Restrictions  Restrictions/Precautions  Restrictions/Precautions: Fall Risk,NPO    Subjective   General  Chart Reviewed: Yes  Patient assessed for rehabilitation services?: Yes  Family / Caregiver Present: Yes  Referring Practitioner: SHELLY Quiroz  Diagnosis: pleural effusion, hypoxia, BLE cellulitis  Subjective  Subjective: Patient rec'd in bathroom, pleasant and cooperative 81 yr old female with altered mental staus     Social/Functional History  Social/Functional History  Lives With: Son  Type of Home: House  Home Layout: Two level,Able to Live on Main level with bedroom/bathroom  Home Access: Stairs to enter without rails  Entrance Stairs - Number of Steps: 1  Bathroom Shower/Tub: Walk-in shower  Bathroom Toilet: Handicap height  Bathroom Equipment: Shower chair,Grab bars around toilet  Bathroom Accessibility: Accessible  Home Equipment: Areta Nay, 4 wheeled,Walker, rolling,Lift chair  Receives Help From: Family  ADL Assistance: Needs assistance  Bath: Minimal assistance  Dressing: Minimal assistance (assist with LB dressing)  Toileting: Needs assistance  Homemaking Assistance: Needs assistance  Homemaking Responsibilities: Yes  Meal Prep Responsibility: Secondary (simple item retrieval and microwave cooking, children stated they are planning on umplugging stove)  Laundry Responsibility: Secondary  Cleaning Responsibility: No  Shopping Responsibility: No  Ambulation Assistance: Independent  Transfer Assistance: Needs assistance  Active : No  Patient's  Info: children  Occupation: Retired  Additional Comments: recent progressive decline with self care       Objective   Heart Rate: 64  Heart Rate Source: Monitor  BP: 97/79  BP Location: Left Arm  BP Method: Automatic  Patient Position: Semi fowlers  MAP (Calculated): 85  Resp: 17  SpO2: 98 %  O2 Device: Nasal cannula          Safety Devices  Type of Devices: Left in chair;Nurse notified;Call light within reach        ADL  LE Dressing: Maximum assistance  Toileting: Maximum assistance        Transfers  Sit to stand: Minimal assistance  Stand to sit: Minimal assistance     Orientation  Overall Orientation Status: Within Functional Limits        LUE AROM (degrees)  LUE AROM : WFL  Left Hand AROM (degrees)  Left Hand AROM: WFL  RUE AROM (degrees)  RUE AROM : WFL  Right Hand AROM (degrees)  Right Hand AROM: WFL       Goals  Short Term Goals  Time Frame for Short term goals: duration of hospital stay  Short Term Goal 1: Patient to complete toilet transfer and toileting tasks with CGA using a/e as needed and good- safety  Short Term Goal 2: Patient to complete LB dressing tasks with min a using a/e as needed  Short Term Goal 3: Patient to complete grooming tasks standing at sink with SBA using a/e as needed and good- safety       Therapy Time   Individual Concurrent Group Co-treatment   Time In 1135         Time Out 1155         Minutes 20         Timed Code Treatment Minutes: 0 Minutes       HERNAN VARGAS OTR, OT

## 2022-06-30 NOTE — PLAN OF CARE
Problem: Discharge Planning  Goal: Discharge to home or other facility with appropriate resources  Outcome: Progressing  Flowsheets (Taken 6/30/2022 0911)  Discharge to home or other facility with appropriate resources:   Identify barriers to discharge with patient and caregiver   Identify discharge learning needs (meds, wound care, etc)   Refer to discharge planning if patient needs post-hospital services based on physician order or complex needs related to functional status, cognitive ability or social support system   Arrange for needed discharge resources and transportation as appropriate   Arrange for interpreters to assist at discharge as needed     Problem: Pain  Goal: Verbalizes/displays adequate comfort level or baseline comfort level  Outcome: Progressing     Problem: Safety - Adult  Goal: Free from fall injury  Outcome: Progressing

## 2022-06-30 NOTE — H&P
HOSPITALIST ADMISSION H&P      REASON FOR ADMISSION:  Cellulitis BLE, Altered mental status, Elevated lactic acid, Hypoxia, Acute cystitis without hematuria, Rt pleural effusion. ESTIMATED LENGTH OF STAY: > 2 midnights    ATTENDING/ADMITTING PHYSICIAN: Colin Johnson MD  PCP: JESSICA Zacarias CNP    HISTORY OF PRESENT ILLNESS:      Patient currently bedded in ER  The patient is a 81 y.o. female patient of JESSICA Zacarias CNP who presents from ER with c/o altered mental status per sons report. Patient was found on the floor this morning next to her bed, patient reports she slid out of bed. Patient reports she has two appointments tomorrow to discuss what is going on, with [facility] and cardiology. Saw Podiatry on 6/27 for bilateral lower extremities wounds. Patient denies pain, shortness of breath, fevers or chills. Recent Augmentin therapy for pseudomonas aeruginosa infection to left foot wound, therapy completed 6/19/22. Hypoxia: pulse ox 87% on arrival,    Large right pleural effusion: First noted 2/8/22. Atrial fib: rate controlled. 2D Echo: 2/10/22:  Summary  The study was performed by the Cardiologist, Cardiac Fellow, and the  Sonographer in the Cath Lab without complications. The patient tolerated the procedure well. Conscious sedation was used. No clots were visualized in the left atrial appendage. Estimated ejection fraction is 50%  Severe tricuspid regurgitation. Mild to moderate mitral regurgitation  No valvular vegetations or thrombus was identified. Negative bubble study. (no right to left intra-cardiac shunt via agitated  saline ). Bi-atrial enlargement. Wounds and LDAs present prior to admission: See media: Left shin, left dorsal and pedal foot, Right lateral foot at great toe. See below for additional PMH.     In ER: Rocephin  CXR:  Impression   Unchanged large right pleural effusion opacifying the inferior 2/3rds of the   right hemithorax.  Right lower lung compressive atelectasis.  Clinical   correlation and ultrasound-guided thoracentesis would be helpful. CT head w/o: Impression   No acute intracranial abnormality. CT cervical spine w/o: Impression   No acute fracture of the cervical spine.       Partially imaged large right pleural effusion. EKG:  Narrative & Impression    Atrial fibrillation  Low voltage QRS  Septal infarct  Abnormal ECG        Patient vvbv-lyvvzvnuhb-fxavesij-available records reviewed, including, but not limited to ER records, imaging results, lab results, office records, personal records, and OARRS -- no signs of abuse or diversion. Past Medical History:   Diagnosis Date    Atrial fibrillation ([address]     CAD (coronary artery disease)     [facility])     Edema     Empyema (HCC)     Pleural effusion            Past Surgical History:   Procedure Laterality Date    CARDIOVERSION  02/10/2022       Medications Prior to Admission:    Prior to Visit Medications    Medication Sig Taking?  Authorizing Provider   furosemide (LASIX) 20 MG tablet Take 40 mg in am and 20 mg at 2 pm.  JESSICA Turner CNP   furosemide (LASIX) 40 MG tablet 40 mg in  am and 20 mg at 2 pm  JESSICA Turner CNP   levothyroxine (SYNTHROID) 25 MCG tablet Take 1.5 tablets by mouth Daily  JESSICA Turner - CNP   potassium chloride (KLOR-CON M) 10 MEQ extended release tablet Take 1 tablet by mouth daily  JESSICA Turner - CAIT   metoprolol tartrate (LOPRESSOR) 25 MG tablet Take 1 tablet by mouth 2 times daily  JESSICA Turner CNP   aspirin 81 MG EC tablet Take 81 mg by mouth daily  Historical Provider, MD   GARLIC PO Take by mouth daily  Historical Provider, MD   miconazole (MICOTIN) 2 % powder Apply topically 2 times daily to under breasts  Evin Amaya APRN - NP   Multiple Vitamins-Minerals (CENTRUM SILVER PO) Take 1 tablet by mouth daily  Historical Provider, MD   Omega-3 Fatty Acids (FISH OIL PO) Take 1 tablet by mouth daily  Historical Provider, MD       Allergies:    Patient has no known allergies. Social History:    reports that she has never smoked. She has never used smokeless tobacco. She reports previous alcohol use. She reports that she does not use drugs. Family History:   family history includes Cancer in her brother and sister. REVIEW OF SYSTEMS:  See HPI and problem list; otherwise no other new complaints with respect to eyes, ENT, neck, pulmonary, coronary, chest, GI, , endocrine, musculoskeletal, immune system/connective tissue disease, hematologic, neurologic, psychiatric, skin, lymphatics, or malignancies. Code status: patient/family wishes for Full Code at this time. PHYSICAL EXAM:  Vitals:  /87   Pulse 82   Temp 98 °F (36.7 °C) (Tympanic)   Resp 20   Ht 5' 5\" (1.651 m)   Wt 188 lb (85.3 kg)   SpO2 96%   BMI 31.28 kg/m²     General: awake, alert, cooperative, well nourished and well groomed  HEENT: PERRLA, EMOI, External nose normal, Normocephalic, Atraumatic and Neck with full ROM  Neck: Supple, Carotid Pulses Present, No Bruits, No Masses, Tenderness, Nodularity and No Lymphadenopathy  Chest/Lungs: Diminished Right side, Bases Diminished Bilaterally and Respirations even and unlabored  Cardiac: Pedal pulses 1+ bilaterally, Irregularly Irregular and Pedal Pulses Palpable Bilaterally  GI/Abdomen: Bowel Sounds Present, Soft, Non-tender, without Guarding or Rebound Tenderness, No Masses and No Tenderness  : Not examined  Extremities/Musculoskeletal: 3-4+ BLE edema up through thighs, BLE with edema, Generalized weakness and All four extremities with 5/5 strength  Skin: Erythema BLE from feet up to knees, Unaboots changed with photos taken of Left shin, Left foot dorsal and pedal, Right foot lateral great toe, Culture obtained from lower shin wound.  and Skin warm and dry  Neuro: Alert and Oriented, to Person, to Time, to Place, to Situation, No Localizing Signs/Symptoms, follows commands with all 4 extremities and Strength equal bilaterally  Psychiatric: Normal mood and affect      LABS:    CBC with Differential:    Lab Results   Component Value Date/Time    WBC 6.7 06/29/2022 05:09 PM    RBC 4.84 06/29/2022 05:09 PM    HGB 14.9 06/29/2022 05:09 PM    HCT 47.2 06/29/2022 05:09 PM     06/29/2022 05:09 PM    MCV 97.5 06/29/2022 05:09 PM    MCH 30.8 06/29/2022 05:09 PM    MCHC 31.6 06/29/2022 05:09 PM    RDW 18.0 06/29/2022 05:09 PM    LYMPHOPCT 11 06/29/2022 05:09 PM    MONOPCT 10 06/29/2022 05:09 PM    BASOPCT 0 06/29/2022 05:09 PM    MONOSABS 0.66 06/29/2022 05:09 PM    LYMPHSABS 0.77 06/29/2022 05:09 PM    EOSABS <0.03 06/29/2022 05:09 PM    BASOSABS <0.03 06/29/2022 05:09 PM    DIFFTYPE NOT REPORTED 02/11/2022 05:05 AM     BMP:    Lab Results   Component Value Date/Time     06/29/2022 05:09 PM    K 4.6 06/29/2022 05:09 PM    CL 97 06/29/2022 05:09 PM    CO2 31 06/29/2022 05:09 PM    BUN 30 06/29/2022 05:09 PM    LABALBU 3.9 06/29/2022 05:09 PM    CREATININE 0.87 06/29/2022 05:09 PM    CALCIUM 9.9 06/29/2022 05:09 PM    GFRAA >60 06/29/2022 05:09 PM    LABGLOM >60 06/29/2022 05:09 PM    GLUCOSE 102 06/29/2022 05:09 PM     Hepatic Function Panel:    Lab Results   Component Value Date/Time    ALKPHOS 152 06/29/2022 05:09 PM    ALT 28 06/29/2022 05:09 PM    AST 66 06/29/2022 05:09 PM    PROT 7.2 06/29/2022 05:09 PM    BILITOT 3.16 06/29/2022 05:09 PM    LABALBU 3.9 06/29/2022 05:09 PM     U/A:    Lab Results   Component Value Date/Time    COLORU NOT REPORTED 02/08/2022 08:40 PM    PROTEINU NEGATIVE 06/29/2022 07:25 PM    PHUR 6.0 06/29/2022 07:25 PM    WBCUA 0 TO 4 06/29/2022 07:25 PM    RBCUA None 06/29/2022 07:25 PM    MUCUS NOT REPORTED 02/08/2022 08:40 PM    TRICHOMONAS NOT REPORTED 02/08/2022 08:40 PM    YEAST NOT REPORTED 02/08/2022 08:40 PM    BACTERIA TRACE 06/29/2022 07:25 PM    SPECGRAV 1.015 06/29/2022 07:25 PM    LEUKOCYTESUR NEGATIVE 06/29/2022 07:25 PM    UROBILINOGEN Normal 06/29/2022 07:25 PM    BILIRUBINUR NEGATIVE 06/29/2022 07:25 PM    GLUCOSEU NEGATIVE 06/29/2022 07:25 PM    AMORPHOUS NOT REPORTED 02/08/2022 08:40 PM     HgBA1c:    Lab Results   Component Value Date/Time    LABA1C 6.2 02/08/2022 02:39 PM     Lactic acid 2.3-> 2.1, Troponin HS 40-> 35, ProBNP 4743, Covid (-). ASSESSMENT:      Patient Active Problem List   Diagnosis    CHF (congestive heart failure), NYHA class I, acute on chronic, combined ([facility] [address]    Atrial fibrillation, persistent (HCC)    Pleural effusion on right    Moderate mitral valve regurgitation    Moderate tricuspid regurgitation    Acquired hypothyroidism    Blue toes    Lymphedema of both lower extremities    Ulcer of left lower leg, limited to breakdown of skin (HCC)    Ulcer of right foot, limited to breakdown of skin (HCC)    Altered mental status    Elevated lactic acid level    Hypoxia    Acute cystitis without hematuria    Bilateral lower leg cellulitis     Patient lrkd-oblrocdadc-qpaijcki-available records reviewed, including, but not limited to,  ER reports---labs---imaging--EKGs--office records----personal notes.     Brandon Simple  87 WF   [reema Parkinson NP---IM;  [facility]---TCC;  jacob Garcia DPM---wound care]  FULL CODE    LOVENOX   SUPPLEMENTAL OXYGEN----6.29.2022        COVID--19----NEGATIVE---2.8.2022----MODERNA---1.22.2022--2.19.2022   AMIODARONE    Anti-infectives:   Rocephin IV    right thoracentesis----6.30.2022 à  > 1 liter off    Cellulitis---BLE--wounds--sees DPM outpatient  Hypoxia----right-sided pleural effusion  Acute cystitis without hematuria---????  Elevated lactic acid level---6.29.2022----normalized with IVF----sepsis ruled out---6.29.2022  BLE edema---lymphedema    Hallucinations  Fall?--found on floor---no LOC            CXR---6.29.2022---pleural effusion--opacifying the inferior 2/3 right hemithorax--right compressive atelectasis that over 50 minutes was spent in evaluation of the patient, review of the chart and pertinent records, discussion with family/staff, etc    JESSICA Fu NP    11:06 PM  6/29/2022

## 2022-06-30 NOTE — PROGRESS NOTES
[address] [facility] Pharmacokinetic Monitoring Service - Vancomycin     Carol Loyola is a 81 y.o. female starting on vancomycin therapy for skin/soft tissue infection. Pharmacy consulted by Magnolia Quijano for monitoring and adjustment. Target Concentration: Goal trough of 10-15 mg/L and AUC/GIANA <500 mg*hr/L    Additional Antimicrobials: ceftriaxone    Pertinent Laboratory Values: Wt Readings from Last 1 Encounters:   06/29/22 188 lb (85.3 kg)     Temp Readings from Last 1 Encounters:   06/29/22 98 °F (36.7 °C) (Tympanic)     Estimated Creatinine Clearance: 50 mL/min (based on SCr of 0.87 mg/dL). Recent Labs     06/29/22  1709   CREATININE 0.87   WBC 6.7     Procalcitonin: N/A    Pertinent Cultures:  Culture Date Source Results   6/29 Blood Pending   MRSA Nasal Swab: N/A. Non-respiratory infection.     Plan:  Dosing recommendations based on Bayesian software  Start vancomycin 1000 mg IVPB q24  Anticipated AUC of 520 and trough concentration of 14 at steady state  Renal labs as indicated   Vancomycin concentration not ordered yet   Pharmacy will continue to monitor patient and adjust therapy as indicated    Thank you for the consult,  Domitila Beal, [facility]  6/29/2022 10:41 PM

## 2022-07-01 NOTE — PLAN OF CARE
Problem: Discharge Planning  Goal: Discharge to home or other facility with appropriate resources  7/1/2022 1906 by aMyur Guerra RN  Outcome: Progressing  7/1/2022 1050 by Vi Chacon RN  Outcome: Progressing  Flowsheets (Taken 7/1/2022 0800)  Discharge to home or other facility with appropriate resources:   Identify barriers to discharge with patient and caregiver   Arrange for needed discharge resources and transportation as appropriate   Identify discharge learning needs (meds, wound care, etc)   Refer to discharge planning if patient needs post-hospital services based on physician order or complex needs related to functional status, cognitive ability or social support system     Problem: Pain  Goal: Verbalizes/displays adequate comfort level or baseline comfort level  7/1/2022 1906 by Mayur Guerra RN  Outcome: Progressing  7/1/2022 1050 by Vi Chacon RN  Outcome: Progressing     Problem: Safety - Adult  Goal: Free from fall injury  7/1/2022 1906 by Mayur Guerra RN  Outcome: Progressing  [address] (Taken 7/1/2022 1051 by Vi Chacon, RN)  Free From Fall Injury:   Instruct family/caregiver on patient safety   Based on caregiver fall risk screen, instruct family/caregiver to ask for assistance with transferring infant if caregiver noted to have fall risk factors  7/1/2022 1050 by Vi Chacon RN  Outcome: Progressing     Problem: ABCDS Injury Assessment  Goal: Absence of physical injury  7/1/2022 1906 by Mayur Guerra RN  Outcome: Progressing  [address] (Taken 7/1/2022 1051 by Vi Chacon, RN)  Absence of Physical Injury: Implement safety measures based on patient assessment  7/1/2022 1050 by Vi Chacon RN  Outcome: Progressing     Problem: Chronic Conditions and Co-morbidities  Goal: Patient's chronic conditions and co-morbidity symptoms are monitored and maintained or improved  7/1/2022 1906 by Mayur Guerra RN  Outcome: Progressing  7/1/2022 1050 by Vi Chacon RN  Outcome: 1906 by Dionicio Collado RN  Outcome: Progressing  7/1/2022 1050 by Kirby Burk RN  Outcome: Progressing  Goal: Maintain proper alignment of affected body part  7/1/2022 1906 by Dionicio Collado RN  Outcome: Progressing  7/1/2022 1050 by Kirby Burk RN  Outcome: Progressing  Goal: Return ADL status to a safe level of function  7/1/2022 1906 by Dionicio Collado RN  Outcome: Progressing  7/1/2022 1050 by Kirby Burk RN  Outcome: Progressing

## 2022-07-01 NOTE — PROGRESS NOTES
Met with patient regarding referral from Dr. Babs Galarza for Heart Failure. Patient and daughter were in room together. Patient interested in program and daughter asked for copies of Heart Failure brochure for her to keep and to give one to her brother. Patient understands that we will be contacting her once she is discharged from hospital to gauge her interest and possibly schedule an outpatient visit.

## 2022-07-01 NOTE — PLAN OF CARE
Problem: Discharge Planning  Goal: Discharge to home or other facility with appropriate resources  Outcome: Progressing  Flowsheets (Taken 7/1/2022 0800)  Discharge to home or other facility with appropriate resources:   Identify barriers to discharge with patient and caregiver   Arrange for needed discharge resources and transportation as appropriate   Identify discharge learning needs (meds, wound care, etc)   Refer to discharge planning if patient needs post-hospital services based on physician order or complex needs related to functional status, cognitive ability or social support system     Problem: Pain  Goal: Verbalizes/displays adequate comfort level or baseline comfort level  Outcome: Progressing     Problem: Safety - Adult  Goal: Free from fall injury  Outcome: Progressing     Problem: Chronic Conditions and Co-morbidities  Goal: Patient's chronic conditions and co-morbidity symptoms are monitored and maintained or improved  Outcome: Progressing  Flowsheets (Taken 7/1/2022 0800)  Care Plan - Patient's Chronic Conditions and Co-Morbidity Symptoms are Monitored and Maintained or Improved:   Monitor and assess patient's chronic conditions and comorbid symptoms for stability, deterioration, or improvement   Collaborate with multidisciplinary team to address chronic and comorbid conditions and prevent exacerbation or deterioration   Update acute care plan with appropriate goals if chronic or comorbid symptoms are exacerbated and prevent overall improvement and discharge

## 2022-07-01 NOTE — PROGRESS NOTES
Hospitalist Progress Note    Patient:  Alcides Pitt     YOB: 1935    MRN: [id number]   Admit date: 6/29/2022     Acct: [de-identified]     PCP: JESSICA Leal CNP    CC--Interval History: Cellulitis---BLE--Vancomycin --> Ancef---pending cultures    Right pleural effusion---thoracentesis --> ~ 1.5 liters off---some re-accumulation---will need to be monitored in the outpatient setting    Supplemental oxygen--currently 3 liters    Hallucinations--denied today    Dementia     Atrial fibrillation---chronic    See note below     All other ROS negative except noted in HPI    Diet:  ADULT DIET;  Regular; Low Fat/Low Chol/High Fiber/MARIA GUADALUPE    Medications:  Scheduled Meds:   ipratropium-albuterol  1 ampule Inhalation Q4H    [START ON 7/2/2022] furosemide  20 mg IntraVENous Daily    aspirin  81 mg Oral Daily    levothyroxine  37.5 mcg Oral Daily    metoprolol tartrate  25 mg Oral BID    miconazole   Topical BID    potassium chloride  10 mEq Oral Daily    sodium chloride flush  5-40 mL IntraVENous 2 times per day    enoxaparin  40 mg SubCUTAneous Daily    cefTRIAXone (ROCEPHIN) IV  1,000 mg IntraVENous Q24H     Continuous Infusions:   sodium chloride       PRN Meds:benzonatate, sodium chloride flush, sodium chloride, ondansetron **OR** ondansetron, polyethylene glycol, acetaminophen **OR** acetaminophen, albuterol    Objective:  Labs:  CBC with Differential:    Lab Results   Component Value Date/Time    WBC 9.3 07/01/2022 05:50 AM    RBC 4.77 07/01/2022 05:50 AM    HGB 14.7 07/01/2022 05:50 AM    HCT 46.9 07/01/2022 05:50 AM     07/01/2022 05:50 AM    MCV 98.3 07/01/2022 05:50 AM    MCH 30.8 07/01/2022 05:50 AM    MCHC 31.3 07/01/2022 05:50 AM    RDW 18.1 07/01/2022 05:50 AM    LYMPHOPCT 8 07/01/2022 05:50 AM    MONOPCT 6 07/01/2022 05:50 AM    BASOPCT 0 07/01/2022 05:50 AM    MONOSABS 0.55 07/01/2022 05:50 AM    LYMPHSABS 0.77 07/01/2022 05:50 AM    EOSABS <0.03 07/01/2022 05:50 AM BASOSABS <0.03 07/01/2022 05:50 AM    DIFFTYPE NOT REPORTED 02/11/2022 05:05 AM     BMP:    Lab Results   Component Value Date/Time     07/01/2022 05:50 AM    K 4.3 07/01/2022 05:50 AM     07/01/2022 05:50 AM    CO2 33 07/01/2022 05:50 AM    BUN 32 07/01/2022 05:50 AM    LABALBU 3.9 06/29/2022 05:09 PM    CREATININE 1.28 07/01/2022 05:50 AM    CALCIUM 9.2 07/01/2022 05:50 AM    GFRAA 48 07/01/2022 05:50 AM    LABGLOM 40 07/01/2022 05:50 AM    GLUCOSE 114 07/01/2022 05:50 AM           Physical Exam:  Vitals: BP 98/63   Pulse 68   Temp 98.5 °F (36.9 °C) (Tympanic)   Resp 18   Ht 5' 5\" (1.651 m)   Wt 195 lb 14.4 oz (88.9 kg)   SpO2 92%   BMI 32.60 kg/m²   24 hour intake/output:    Intake/Output Summary (Last 24 hours) at 7/1/2022 1119  Last data filed at 7/1/2022 0855  Gross per 24 hour   Intake --   Output 700 ml   Net -700 ml     Last 3 weights: Wt Readings from Last 3 Encounters:   07/01/22 195 lb 14.4 oz (88.9 kg)   06/27/22 188 lb 3.2 oz (85.4 kg)   05/03/22 184 lb 9.6 oz (83.7 kg)     HEENT: Normocephalic and Atraumatic  Neck: Supple, No Masses, Tenderness, Nodularity and No Lymphadenopathy  Chest/Lungs: crackles diffusely---much better air movement on the right  Cardiac: Regular Rate and Rhythm  GI/Abdomen: Bowel Sounds Present and Soft, Non-tender, without Guarding or Rebound Tenderness  : Not examined  EXT/Skin: UNNA boot BLE---scaling toes---capillary refill < 2 seconds  Neuro: alert---generalized weakness      Assessment:    Principal Problem:    Bilateral lower leg cellulitis  Active Problems:    Altered mental status    Elevated lactic acid level    Hypoxia    Acute cystitis without hematuria    Pleural effusion on right  Resolved Problems:    * No resolved hospital problems.  *     Elio Rangel  87 WF   [reema Parkinson NP---IM;  [facility]---T[facility];  jacob Garcia DPM---wound care]  FULL CODE    LOVENOX   SUPPLEMENTAL OXYGEN----6.29.2022 KALEB--6.30.2022---7. 1.2022  COVID--19----NEGATIVE---2.8.2022----MODERNA---1.22.2022--2.19.2022   AMIODARONE    Anti-infectives:   Rocephin IV    POD ____  right thoracentesis----6.30.2022 à  > 1.5 +  liters off---appears to have some re-accumulation                      CXR----7. 1.2022--interval increase in inferior ½ right hemithorax since post thoracentesis film--6.30.2022    Cellulitis---BLE--wounds--sees DPM outpatient  Hypoxia----right-sided pleural effusion  Acute cystitis without hematuria---????  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           Recurrent---2.11.2022          EKG----2.11.2022---atrial fibrillation---76---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          DIANA--successful cardioversion----2.10.2022          Newly diagnosed----2.8.2022  CHF---chronic combined          Elevate PBNP---6.29.2022          Acute--on--likely chronic listed as combined---systolic---diastolic          2D YXNO----7.9.7610---VC moderately dilated----mild LVH---NLVSF---RA severely dilated---                              RV dilatation---reduce RVSF--MAC---moderate MR---moderate-to-severe TR---                             RVSP ~ 38 mm Hg---LVEF ~ 50%  Valvular heart disease---see above   ASCVD  PAD            Blue toes   Hypothyroidism   CKD----Stage 3b  PMH:     empyema--right chest---1982,   right pleural effusion  PSH:      bronchoscopy---thoracostomy tube---1982    Allergies:     NKDA       Plan:  1. BLE cellulitis--Rocephin  2. Hypoxia---supplemental oxygen--goal O2 sat 90-95%  3. CKD--slight increase creatinine --> Lasix BID ---> daily  4. Monitor right pleural effusion---await culture  5. Monitor urinary output---witt, if recurrent retention  6.     See orders    Electronically signed by Thony Andrews on 7/1/2022 at 11:19 AM    Hospitalist

## 2022-07-01 NOTE — PROGRESS NOTES
Occupational Therapy  Facility/Department: [facility]  PROGRESSIVE CARE  Daily Treatment Note  NAME: Dionna Rosen  : 1935  MRN: [id number]    Date of Service: 2022    Discharge Recommendations:  Continue to assess pending progress,Long Term Care with [facility] would benefit from continued therapy after discharge       Patient Diagnosis(es): The primary encounter diagnosis was Altered mental status, unspecified altered mental status type. Diagnoses of Hypoxia, Pleural effusion, and Cellulitis of lower extremity, unspecified laterality were also pertinent to this visit. Assessment    Activity Tolerance: Patient tolerated treatment well  Discharge Recommendations: Continue to assess pending progress; [address] with OT;Patient would benefit from continued therapy after discharge      Plan   Plan  Times per Week: 1-2  Current Treatment Recommendations: Patient/Caregiver education & training;Equipment evaluation, education, & procurement;Self-Care / ADL; Functional mobility training; Safety education & training       Subjective   Subjective  Subjective: Pt rec'd sitting up in chair, pleasant and cooperative for out. Pt pleasantly confused throguhout treatment session. Pain: Pt denies pain at rest, but states that the pain flares up in B feet (8/10) when standing. Orientation  Overall Orientation Status: Impaired  Orientation Level: Oriented to person  Cognition  Overall Cognitive Status: Exceptions  Following Commands:  Follows one step commands consistently  Memory: Decreased recall of recent events;Decreased recall of biographical Information;Decreased recall of precautions  Safety Judgement: Decreased awareness of need for assistance;Decreased awareness of need for safety  Insights: Decreased awareness of deficits        Objective    ADL  Grooming: Contact guard assistance  Grooming Skilled Clinical Factors: Attempted to have patient complete grooming task of combing her hair while standing at sink, but patient requested to sit after standing <1 min. Pt completed rest of grooming task while seated. Pt declined brushing teeth this date. LE Dressing: Moderate assistance  LE Dressing Skilled Clinical Factors: Trialed sock aid to lynn socks with Mod A d/t patient having wraps on BLE  Safety Devices  Type of Devices: Left in chair;Nurse notified;Call light within reach (daughter in room with patient)   Patient Education  Education Given To: Patient  Education Provided: Role of Therapy;Plan of Care;Transfer Training  Education Provided Comments: Educated patient on safe transfer techniques including pushing up from the surface that she is sitting on and reaching back for a chair before sitting down. Pt educated on slow transitional movements for increased safety with questionable follow-through. Education Method: Verbal  Barriers to Learning: Cognition  Education Outcome: Unable to demonstrate understanding   Patient required CGA for all functional mobility this date with use of 2ww and Min/Mod VCs for safety.      Goals  Short Term Goals  Time Frame for Short term goals: duration of hospital stay  Short Term Goal 1: Patient to complete toilet transfer and toileting tasks with CGA using a/e as needed and good- safety  Short Term Goal 2: Patient to complete LB dressing tasks with min a using a/e as needed  Short Term Goal 3: Patient to complete grooming tasks standing at sink with SBA using a/e as needed and good- safety       Therapy Time   Individual Concurrent Group Co-treatment   Time In 1010         Time Out 1035         Minutes 25         Timed Code Treatment Minutes: Loco 45, OTD, OTR/L

## 2022-07-02 NOTE — PROGRESS NOTES
Wound culture results with sensitivity returned today around 11:00 am, antibiotic therapy changed to Cipro GIANA <0.25.

## 2022-07-02 NOTE — PLAN OF CARE
Problem: Discharge Planning  Goal: Discharge to home or other facility with appropriate resources  7/2/2022 1906 by Yoseph Farah RN  Outcome: Progressing  7/2/2022 1329 by Aden Villarreal RN  Outcome: Progressing  Flowsheets (Taken 7/2/2022 1329)  Discharge to home or other facility with appropriate resources: Identify barriers to discharge with patient and caregiver  Note: Discharge planning in process and discussed with patient/family. Social work consulted for any additional needs. Care manager aware of discharge needs. Problem: Pain  Goal: Verbalizes/displays adequate comfort level or baseline comfort level  7/2/2022 1906 by Yoseph Farah RN  Outcome: Progressing  7/2/2022 1329 by Aden Villarreal RN  Outcome: Progressing  Flowsheets (Taken 7/2/2022 1329)  Verbalizes/displays adequate comfort level or baseline comfort level: Assess pain using appropriate pain scale  Note: Patient able to use 0-10 pain scale. Denies pain at this time. Agreeable to take PRN pain medications. Problem: Safety - Adult  Goal: Free from fall injury  7/2/2022 1906 by Yoseph Farah RN  Outcome: Progressing  7/2/2022 1329 by Aden Villarreal RN  Outcome: Progressing  Flowsheets (Taken 7/1/2022 1051 by Abbi Casillas RN)  Free From Fall Injury:   Yousif Leslie family/caregiver on patient safety   Based on caregiver fall risk screen, instruct family/caregiver to ask for assistance with transferring infant if caregiver noted to have fall risk factors  Note: Call light in reach, bed in lowest position, and bed alarm activated. Education given on use of call light before ambulation and when in need of assistance. Patient expressed understanding. Hourly visual checks performed and charted. Toileting offered to patient. No falls this shift, at any time. Arm band and falling star in place. Will continue to monitor.       Problem: ABCDS Injury Assessment  Goal: Absence of physical injury  7/2/2022 1906 by Yoseph Farah RN  Outcome: Progressing  7/2/2022 1329 by Nanci Fontaine RN  Outcome: Progressing  Flowsheets (Taken 7/2/2022 1329)  Absence of Physical Injury: Implement safety measures based on patient assessment     Problem: Chronic Conditions and Co-morbidities  Goal: Patient's chronic conditions and co-morbidity symptoms are monitored and maintained or improved  7/2/2022 1906 by Ridge Alonzo RN  Outcome: Progressing  7/2/2022 1329 by Nanci Fontaine RN  Outcome: Progressing     Problem: Respiratory - Adult  Goal: Achieves optimal ventilation and oxygenation  7/2/2022 1906 by Ridge Alonzo RN  Outcome: Progressing  7/2/2022 1329 by Nanci Fontaine RN  Outcome: Progressing     Problem: Cardiovascular - Adult  Goal: Maintains optimal cardiac output and hemodynamic stability  7/2/2022 1906 by Ridge Alonzo RN  Outcome: Progressing  7/2/2022 1329 by Nanci Fontaine RN  Outcome: Progressing  Goal: Absence of cardiac dysrhythmias or at baseline  7/2/2022 1906 by Ridge Alonzo RN  Outcome: Progressing  7/2/2022 1329 by Nanci Fontaine RN  Outcome: Progressing     Problem: Skin/Tissue Integrity - Adult  Goal: Skin integrity remains intact  7/2/2022 1906 by Ridge Alonzo RN  Outcome: Progressing  7/2/2022 1329 by Nanci Fontaine RN  Outcome: Progressing  Goal: Incisions, wounds, or drain sites healing without S/S of infection  7/2/2022 1906 by Ridge Alonzo RN  Outcome: Progressing  7/2/2022 1329 by Nanci Fontaine RN  Outcome: Progressing  Goal: Oral mucous membranes remain intact  7/2/2022 1906 by Ridge Alonzo RN  Outcome: Progressing  7/2/2022 1329 by Nanci Fontaine RN  Outcome: Progressing     Problem: Musculoskeletal - Adult  Goal: Return mobility to safest level of function  7/2/2022 1906 by Ridge Alonzo RN  Outcome: Progressing  7/2/2022 1329 by Nanci Fontaine RN  Outcome: Progressing  Goal: Maintain proper alignment of affected body part  7/2/2022 1906 by Ridge Alonzo RN  Outcome: Progressing  7/2/2022 1329 by Nanci Fontaine RN  Outcome:

## 2022-07-02 NOTE — PROGRESS NOTES
Physical Therapy  Facility/Department: [facility]  PROGRESSIVE CARE  Daily Treatment Note  NAME: Sienna Reyez  : 1935  MRN: [id number]    Date of Service: 2022    Discharge Recommendations:  Continue to assess pending progress,Long Term Care with PT,Patient would benefit from continued therapy after discharge        Patient Diagnosis(es): The primary encounter diagnosis was Altered mental status, unspecified altered mental status type. Diagnoses of Hypoxia, Pleural effusion, and Cellulitis of lower extremity, unspecified laterality were also pertinent to this visit. Assessment   Activity Tolerance: Patient tolerated treatment well     Plan    Plan  Plan: 1 time a day 7 days a week  Current Treatment Recommendations: Strengthening;Balance training;Functional mobility training;Transfer training;ADL/Self-care training;Gait training;Neuromuscular re-education;Home exercise program;Safety education & training;Patient/Caregiver education & training; Therapeutic activities     Restrictions  Restrictions/Precautions  Restrictions/Precautions: Fall Risk,NPO     Subjective    Subjective  Subjective: Pt in chair upon arrival with daughter present.  pt pleasant and agreeable to session  Pain: some pain with ambulation in B LEs not rated  Orientation  Overall Orientation Status: Within Functional Limits     Objective   Vitals  O2 Device: Nasal cannula  Bed Mobility Training  Bed Mobility Training: No  Balance  Sitting: Intact  Standing: Intact  Transfer Training  Transfer Training: Yes  Overall Level of Assistance: Contact-guard assistance  Sit to Stand: Contact-guard assistance (cues to push off from chair)  Stand to Sit: Contact-guard assistance  Gait Training  Gait Training: Yes  Gait  Overall Level of Assistance: Contact-guard assistance  Distance (ft): 30 Feet  Assistive Device: Walker, rolling  Neuromuscular Education  NDT Treatment: Gait ;Sitting;Standing  PT Exercises  A/AROM Exercises: 10x glute sets, 10x LAQ,

## 2022-07-02 NOTE — PLAN OF CARE
Problem: Discharge Planning  Goal: Discharge to home or other facility with appropriate resources  Outcome: Progressing  Flowsheets (Taken 7/2/2022 1329)  Discharge to home or other facility with appropriate resources: Identify barriers to discharge with patient and caregiver  Note: Discharge planning in process and discussed with patient/family. Social work consulted for any additional needs. Care manager aware of discharge needs. Problem: Pain  Goal: Verbalizes/displays adequate comfort level or baseline comfort level  Outcome: Progressing  Flowsheets (Taken 7/2/2022 1329)  Verbalizes/displays adequate comfort level or baseline comfort level: Assess pain using appropriate pain scale  Note: Patient able to use 0-10 pain scale. Denies pain at this time. Agreeable to take PRN pain medications. Problem: Safety - Adult  Goal: Free from fall injury  Outcome: Progressing  Flowsheets (Taken 7/1/2022 1051 by Christian Cuevas RN)  Free From Fall Injury:   Instruct family/caregiver on patient safety   Based on caregiver fall risk screen, instruct family/caregiver to ask for assistance with transferring infant if caregiver noted to have fall risk factors  Note: Call light in reach, bed in lowest position, and bed alarm activated. Education given on use of call light before ambulation and when in need of assistance. Patient expressed understanding. Hourly visual checks performed and charted. Toileting offered to patient. No falls this shift, at any time. Arm band and falling star in place. Will continue to monitor.       Problem: ABCDS Injury Assessment  Goal: Absence of physical injury  Outcome: Progressing  Flowsheets (Taken 7/2/2022 1329)  Absence of Physical Injury: Implement safety measures based on patient assessment     Problem: Chronic Conditions and Co-morbidities  Goal: Patient's chronic conditions and co-morbidity symptoms are monitored and maintained or improved  Outcome: Progressing Problem: Respiratory - Adult  Goal: Achieves optimal ventilation and oxygenation  Outcome: Progressing     Problem: Cardiovascular - Adult  Goal: Maintains optimal cardiac output and hemodynamic stability  Outcome: Progressing  Goal: Absence of cardiac dysrhythmias or at baseline  Outcome: Progressing     Problem: Skin/Tissue Integrity - Adult  Goal: Skin integrity remains intact  Outcome: Progressing  Goal: Incisions, wounds, or drain sites healing without S/S of infection  Outcome: Progressing  Goal: Oral mucous membranes remain intact  Outcome: Progressing     Problem: Musculoskeletal - Adult  Goal: Return mobility to safest level of function  Outcome: Progressing  Goal: Maintain proper alignment of affected body part  Outcome: Progressing  Goal: Return ADL status to a safe level of function  Outcome: Progressing

## 2022-07-02 NOTE — PROGRESS NOTES
Hospitalist Progress Note  7/2/2022 10:35 AM  Subjective:   Admit Date: 6/29/2022  PCP: JESSICA Cardona CNP    Interval History: Patient states feeling better her breathing has improved with thoracentesis repeat CXR today still shows effusion. No chest pain. confusion has resolved. Diet: ADULT DIET; Regular; Low Fat/Low Chol/High Fiber/MARIA GUADALUPE  Medications:   Scheduled Meds:   ipratropium-albuterol  1 ampule Inhalation Q4H    furosemide  20 mg IntraVENous Daily    aspirin  81 mg Oral Daily    levothyroxine  37.5 mcg Oral Daily    metoprolol tartrate  25 mg Oral BID    miconazole   Topical BID    potassium chloride  10 mEq Oral Daily    sodium chloride flush  5-40 mL IntraVENous 2 times per day    enoxaparin  40 mg SubCUTAneous Daily    cefTRIAXone (ROCEPHIN) IV  1,000 mg IntraVENous Q24H     Continuous Infusions:   sodium chloride       CBC:   Recent Labs     06/30/22  1036 07/01/22  0550 07/02/22  0610   WBC 4.8 9.3 6.5   HGB 13.9 14.7 13.4     165 179 147     BMP:    Recent Labs     06/30/22  0719 07/01/22  0550 07/02/22  0610    142 143   K 4.8 4.3 4.1    101 102   CO2 33* 33* 33*   BUN 31* 32* 33*   CREATININE 1.17* 1.28* 1.18*   GLUCOSE 90 114* 103*     Hepatic:   Recent Labs     06/29/22  1709   AST 66*   ALT 28   BILITOT 3.16*   ALKPHOS 152*     Troponin: No results for input(s): TROPONINI in the last 72 hours. BNP: No results for input(s): BNP in the last 72 hours. Lipids: No results for input(s): CHOL, HDL in the last 72 hours. Invalid input(s): LDLCALCU  INR:   Recent Labs     06/30/22  1036   INR 1.2       Objective:   Vitals: BP (!) 98/59   Pulse 87   Temp 97.6 °F (36.4 °C) (Tympanic)   Resp 18   Ht 5' 5\" (1.651 m)   Wt 194 lb (88 kg)   SpO2 95%   BMI 32.28 kg/m²   General appearance: alert and cooperative with exam  HEENT: Eye: Normal external eye, conjunctiva, lids cornea, ESTHER.   Neck: no adenopathy, no carotid bruit, no JVD, supple, symmetrical, trachea midline and thyroid not enlarged, symmetric, no tenderness/mass/nodules  Lungs: clear to auscultation bilaterally  Heart: regular rate and rhythm, S1, S2 normal, no murmur, click, rub or gallop  Abdomen: soft, non-tender; bowel sounds normal; no masses,  no organomegaly  Extremities: both lower extremities wrapped in dresing  Neurologic: Mental status: Alert, oriented, thought content appropriate    Assessment and Plan:   1. Bilateral lower extremity cellulitis. 2. Pleural effusion right. 3. A fib in NSR  4. Hypoxia   5 . Chronic diastolc CHF     Plan:Continue IV antibiotics,dressing change. jeremy down oxygen as possible. Continue rest of plan.   Patient Active Problem List:     CHF (congestive heart failure), NYHA class I, acute on chronic, combined (HCC)     Atrial fibrillation, persistent (HCC)     Pleural effusion on right     Moderate mitral valve regurgitation     Moderate tricuspid regurgitation     Acquired hypothyroidism     Blue toes     Lymphedema of both lower extremities     Ulcer of left lower leg, limited to breakdown of skin (HCC)     Ulcer of right foot, limited to breakdown of skin (HCC)     Altered mental status     Elevated lactic acid level     Hypoxia     Acute cystitis without hematuria     Bilateral lower leg cellulitis      Delmi Palmer MD, MD  Rounding Hospitalist

## 2022-07-03 NOTE — PROGRESS NOTES
Home Oxygen Evaluation completed. Patient is on 2 liters per minute via nasal cannula.     Resting SpO2 on 2lpm =93%    Resting SpO2 on room air = 87%      [employer], RCP  10:21 AM

## 2022-07-03 NOTE — DISCHARGE SUMMARY
Discharge Summary    Shanthi Nelson Patient Status:  Inpatient    1935 MRN [id number]   Location [address] [place] Attending Jacqueline Fuller Day # 4 PCP JESSICA Galeano CNP     Date of Admission: 2022    Date of Discharge: 7/3/2022    Admitting Diagnosis: Pleural effusion [J90]  Hypoxia [R09.02]  Bilateral lower leg cellulitis [L03.116, L03.115]  Cellulitis of lower extremity, unspecified laterality [L03.119]  Altered mental status, unspecified altered mental status type [R41.82]    Discharge Diagnosis:   Bilateral cellulitis with leg wounds. Hypoxia. CHF diastolic. Right pleural effusion. Chronic AFIB  Reason for Admission/HPI: Presented with Cellulitis BLE h/o chroic lymphedema legs and open  Wounds was seeing wound care. Also had AMS and hypoxia with large right pleural effusion     Hospital Course: Patient was on Rocephin and Vancomycin already had a UNNA boot in place per wound care. Cardiology consulted recommended IV Lasix and thoracentesis patient had declined anticoagulants for Afib is on daily low dose Aspirin. IR was consulted and had US guided thoracentesis with removal of about 1.5 litres of fluid from right side. Fluid culture with  no growth cytology pending patient feeling much better with breathing repeat CXR with persistence of effusion has continued to be on supplemental oxygen and is willing to go home on oxygen with home health has outpatient follow up with wound care for [facility] boot change on 2022. Wounc culture with pseudomonas so was switched to cipro based on C&S  Will be discharged on oral cipro. BP has been on low side while here so her metoprolol dose was cut back to 25 mg 1.2 tablet twice daily . her AMS has resolved is back at her baseline. Will need monitoring of right pleural effusion as outpatient per PCP and repeat thoracentesis as needed    Consultations: Cardiology.  IR.    Procedures: Thoracentesis per IR    Complications: None    Disposition: Home with Home health      Discharge Condition: Stable    Discharge Medications:      Medication List      START taking these medications    ciprofloxacin 500 MG tablet  Commonly known as: Cipro  Take 1 tablet by mouth 2 times daily for 10 days        CHANGE how you take these medications    furosemide 20 MG tablet  Commonly known as: Lasix  Take 40 mg in am and 20 mg at 2 pm.  What changed: Another medication with the same name was removed. Continue taking this medication, and follow the directions you see here. metoprolol tartrate 25 MG tablet  Commonly known as: LOPRESSOR  Take 0.5 tablets by mouth 2 times daily  What changed: how much to take        CONTINUE taking these medications    aspirin 81 MG EC tablet     CENTRUM SILVER PO     FISH OIL PO     GARLIC PO     levothyroxine 25 MCG tablet  Commonly known as: SYNTHROID  Take 1.5 tablets by mouth Daily     miconazole 2 % powder  Commonly known as: MICOTIN  Apply topically 2 times daily to under breasts     potassium chloride 10 MEQ extended release tablet  Commonly known as: KLOR-CON M  Take 1 tablet by mouth daily           Where to Get Your Medications      These medications were sent to [facility] [id number] [place] [id number]    Phone: [phone]   · ciprofloxacin 500 MG tablet     Information about where to get these medications is not yet available    Ask your nurse or doctor about these medications  · metoprolol tartrate 25 MG tablet         Follow up Visits:  Follow-up with WOUND CARE on 6/5/2022,f/u with PCP next week      Total Time spent with patient and coordinating care:  40 minutes    Tara Lopez MD  7/3/2022  10:45 AM

## 2022-07-03 NOTE — PROGRESS NOTES
Discharge teaching and instructions for diagnosis of cellulitis of bilateral legs completed with patient and son using teachback method. AVS reviewed with the patient. Prescriptions were sent to [facility] for the patient to  after discharge. Patient and son voiced understanding regarding prescriptions, education sheets regarding new medications were given to the patient (cipro), follow up referals weresent for the patient, and care of self at home was discussed. Unit phone number highlighted on AVS if questions arise.

## 2022-07-03 NOTE — PROGRESS NOTES
Physical Therapy  Facility/Department: [facility]  PROGRESSIVE CARE  Daily Treatment Note  NAME: Lisbet Zimmerman  : 1935  MRN: [id number]    Date of Service: 7/3/2022    Discharge Recommendations:  Continue to assess pending progress,Long Term Care with PT,Patient would benefit from continued therapy after discharge        Patient Diagnosis(es): The primary encounter diagnosis was Altered mental status, unspecified altered mental status type. Diagnoses of Hypoxia, Pleural effusion, and Cellulitis of lower extremity, unspecified laterality were also pertinent to this visit. Assessment   Activity Tolerance: Patient tolerated treatment well;Patient limited by fatigue     Plan    Plan  Plan: 1 time a day 7 days a week  Current Treatment Recommendations: Strengthening;Balance training;Functional mobility training;Transfer training;ADL/Self-care training;Gait training;Neuromuscular re-education;Home exercise program;Safety education & training;Patient/Caregiver education & training; Therapeutic activities     Restrictions  Restrictions/Precautions  Restrictions/Precautions: Fall Risk,NPO     Subjective    Subjective  Subjective: Pt in bed upon arrival. Pt family present.  pt pleasant and agreeable to session with some encouragement throughout  Pain: denies  Orientation  Overall Orientation Status: Within Normal Limits     Objective   Vitals  O2 Device: Nasal cannula  Bed Mobility Training  Bed Mobility Training: Yes  Overall Level of Assistance: Minimum assistance  Supine to Sit: Contact-guard assistance;Minimum assistance (assist for trunk to allow for LE management)  Scooting: Minimum assistance (pt required use of hand to pull self fwd)  Balance  Sitting: Intact  Standing: Intact (pt able to manage chair setup with no UE support ~30 seconds)  Transfer Training  Transfer Training: Yes  Overall Level of Assistance: Contact-guard assistance  Sit to Stand: Contact-guard assistance (cues to push off from bed)  Stand to Sit: Contact-guard assistance  Gait Training  Gait Training: Yes  Gait  Overall Level of Assistance: Contact-guard assistance  Step Length: Right shortened;Left shortened  Distance (ft): 15 Feet  Assistive Device: Walker, rolling  Neuromuscular Education  NDT Treatment: Gait ;Sitting;Standing  PT Exercises  A/AROM Exercises: 15x glute sets, 15x quad sets, 15x LAQ, 10x seated marches, 15x hip abduction, 10x ankle pumps     Safety Devices  Type of Devices: Left in chair;Nurse notified;Call light within reach       Goals  Short Term Goals  Time Frame for Short term goals: Length of stay  Short term goal 1: Patient will complete transfers with CGA  Short term goal 2: Patient will ambualte 21' with SBA and RW    Education       Therapy Time   Individual Concurrent Group Co-treatment   Time In 0824         Time Out 0857         Minutes 35                 Sonya Hartman, JER

## 2022-07-05 PROBLEM — N18.30 CHRONIC RENAL DISEASE, STAGE III (HCC): Status: ACTIVE | Noted: 2022-01-01

## 2022-07-05 NOTE — TELEPHONE ENCOUNTER
From: Chula Bright  To: Davemark Anju  Sent: 6/30/2022 7:55 AM EDT  Subject: hospitalization of mom    My brother called early this am and had gone back to check on mom. She was in pain because they removed the bandaged legs Dr Madhu Hogan had done in the wound care that we had said was to remain on for one week. They rewrapped and she complained they were too tight and causing pain. He asked to give her tylenol and said it finally calmed her down.  Not sure how they look but am heading up shortly to ER as she was to stay there until a bed was opened up in the hospital.   Her daughter Britany Dumont

## 2022-07-05 NOTE — PLAN OF CARE
Problem: Chronic Conditions and Co-morbidities  Goal: Patient's chronic conditions and co-morbidity symptoms are monitored and maintained or improved  Outcome: Progressing     Problem: Cognitive:  Goal: Knowledge of wound care  Description: Knowledge of wound care  Outcome: Progressing  Goal: Understands risk factors for wounds  Description: Understands risk factors for wounds  Outcome: Progressing     Problem: Wound:  Goal: Will show signs of wound healing; wound closure and no evidence of infection  Description: Will show signs of wound healing; wound closure and no evidence of infection  Outcome: Progressing     Problem: Venous:  Goal: Signs of wound healing will improve  Description: Signs of wound healing will improve  Outcome: Progressing     Problem: Compression therapy:  Goal: Will be free from complications associated with compression therapy  Description: Will be free from complications associated with compression therapy  Outcome: Progressing     Problem: Weight control:  Goal: Ability to maintain an optimal weight for height and age will be supported  Description: Ability to maintain an optimal weight for height and age will be supported  Outcome: Progressing     Problem: Falls - Risk of:  Goal: Will remain free from falls  Description: Will remain free from falls  Outcome: Progressing     Problem: Blood Glucose:  Goal: Ability to maintain appropriate glucose levels will improve  Description: Ability to maintain appropriate glucose levels will improve  Outcome: Progressing

## 2022-07-05 NOTE — TELEPHONE ENCOUNTER
Additional messages received this morning which were sent by patient's daughter over the weekend regarding questions about patient's d/c orders. Spoke with daughter, Rafi Aviles. Hospital follow up visit scheduled for this afternoon.

## 2022-07-05 NOTE — PROGRESS NOTES
Patient presented for dressing change per order of Dr. Magali Reddy. Old dressing removed. Periwound and ulcer(s) cleansed. New dressing applied (foam, super absorbant and unna boot; betadine to toes). [place] Application   Below Knee    NAME:  Karen Connell  YOB: 1935  MEDICAL RECORD NUMBER:  [id number]  DATE:  7/5/2022    [place] boot: Applied moisturizing agent to dry skin as needed. Appied primary and secondary dressing as ordered. Applied Unna roll from toes to knee overlapping each time. Applied ace wrap or coban from toes to below the knee. Instructed patient/caregiver to keep dressing dry and intact. DO NOT REMOVE DRESSING. Instructed pt/family/caregiver to report excessive draining, loose bandage, wet dressing, severe pain or tingling in toes. Applied [place] dressing below the knee to right lower leg. Applied [place] dressing below the knee to left lower leg. Unna Boot(s) were applied per  Guidelines. Scheduled with Dr. Magali Reddy in wound care on 7/11/2022.                  Electronically signed by Savannah Bernard RN on 7/5/2022 at 3:41 PM

## 2022-07-05 NOTE — PROGRESS NOTES
Transition Care Office Visit    Date of Face-to-Face: 7/5/2022  Persons at visit: Son and daughter  Date of interactive contact/ attempted contact with the patient and/or caregiver: Seen within 50 business hours since discharge-See transitional care telephone note. HPI 81-year-old patient with history of CHF, chronic A. fib, recurrent cellulitis, chronic edema/lymphedema bilateral lower extremities being seen for post hospital follow-up from [facility] where she was admitted 6/29/2022 through 7/3/2022 for hypoxia, pleural effusion, confusion, bilateral lower leg cellulitis. Patient was placed on Rocephin and vancomycin. Has already been seeing wound care had Unna boot in place. Was treated with IV Lasix and underwent a thoracentesis. Cardiology was reconsulted. Patient with history of chronic A. fib. Continues on a low-dose of aspirin. Previously myself and cardiology discussed the risk of no anticoagulations and increased CVA risk. Patient continues to decline. 1.5 L was removed from the right thoracentesis. Cytology negative. Follow-up chest x-ray improved per hospital notes her wound culture grew Pseudomonas. Was discharged on Cipro. Metoprolol was changed to 12.5 mg twice daily as she was having some hypotension. Mentation back to baseline and she was subsequently sent home with family. Son does live above her. States he checks on her at least 5-10 times a day. Patient does require a wheelchair unable to ambulate long distance due to dyspnea on exertion. Activity: activity as tolerated  Any medication changes since post-hospitalization phone call? No  Any treatment changes since post-hospitalization phone call? No  Any further education needed on medications/treatment plan?  No    Current Medications   Outpatient Medications Marked as Taking for the 7/5/22 encounter (Office Visit) with JESSICA Gomez CNP   Medication Sig Dispense Refill    metoprolol tartrate (LOPRESSOR) 25 MG tablet Take 0.5 tablets by mouth 2 times daily 45 tablet 3    furosemide (LASIX) 20 MG tablet Take 1 tablet by mouth 2 times daily 180 tablet 3    ciprofloxacin (CIPRO) 500 MG tablet Take 1 tablet by mouth 2 times daily for 10 days 20 tablet 0    levothyroxine (SYNTHROID) 25 MCG tablet Take 1.5 tablets by mouth Daily 135 tablet 0    potassium chloride (KLOR-CON M) 10 MEQ extended release tablet Take 1 tablet by mouth daily 90 tablet 3    aspirin 81 MG EC tablet Take 81 mg by mouth daily      GARLIC PO Take by mouth daily      miconazole (MICOTIN) 2 % powder Apply topically 2 times daily to under breasts 45 g 1    Multiple Vitamins-Minerals (CENTRUM SILVER PO) Take 1 tablet by mouth daily      Omega-3 Fatty Acids (FISH OIL PO) Take 1 tablet by mouth daily         Medication Reconciliation  Provider has reviewed medication record and it has been modified as necessary to accurately depict current medications since hospitalization. Radha Jimenez has been instructed on these changes. Social History     Socioeconomic History    Marital status:      Spouse name: None    Number of children: None    Years of education: None    Highest education level: None   Occupational History     Employer: [employer]OMEM[employer]   Tobacco Use    Smoking status: Never Smoker    Smokeless tobacco: Never Used   Vaping Use    Vaping Use: Never used   Substance and Sexual Activity    Alcohol use: Not Currently    Drug use: Never    Sexual activity: Not Currently   Other Topics Concern    None   Social History Narrative    None     Social Determinants of Health     Financial Resource Strain: Low Risk     Difficulty of Paying Living Expenses: Not hard at all   Food Insecurity: No Food Insecurity    Worried About Running Out of Food in the Last Year: Never true    Ran Out of Food in the Last Year: Never true   Transportation Needs:     Lack of Transportation (Medical): Not on file    Lack of Transportation (Non-Medical):  Not on file Physical Activity: Inactive    Days of Exercise per Week: 0 days    Minutes of Exercise per Session: 0 min   Stress:     Feeling of Stress : Not on file   Social Connections:     Frequency of Communication with Friends and Family: Not on file    Frequency of Social Gatherings with Friends and Family: Not on file    Attends Voodoo Services: Not on file    Active Member of Clubs or Organizations: Not on file    Attends Club or Organization Meetings: Not on file    Marital Status: Not on file   Intimate Partner Violence:     Fear of Current or Ex-Partner: Not on file    Emotionally Abused: Not on file    Physically Abused: Not on file    Sexually Abused: Not on file   Housing Stability:     Unable to Pay for Housing in the Last Year: Not on file    Number of [place] in the Last Year: Not on file    Unstable Housing in the Last Year: Not on file       Past Medical History:   Diagnosis Date    Atrial fibrillation ([facility]     CAD (coronary artery disease)     CHF (congestive heart failure) ([facility]     Edema     Empyema ([facility]     Pleural effusion        Family History   Problem Relation Age of Onset    Cancer Sister     Cancer Brother        Updated and reviewed pertinent PSFH    No Known Allergies    ROS   General: Denies fever, chills, night sweats, or recent weight changes. Skin: Denies any rashes/wounds. Positive fatigue  HEENT: Denies any headaches. Denies any blurred vision, double vision, or eye pain. Denies any tinnitus, vertigo, or dizziness. Denies discharge, stuffiness, obstruction, or epistaxis. Denies any hoarseness, dysphagia/ pain. Cardiovascular: Denies any chest pain, shortness of breath, dyspnea on exertion, orthopnea, or palpations. Respiratory: Positive cough  Gastrointestinal: Denies any nausea, vomiting, diarrhea, constipation, or melena. Genitourinary: Denies any dysuria, hematuria, frequency, urgency, or hesitancy.    Endocrine:  Denies any heat or cold intolerances, polydipsia, polyuria, or polyphagia. Musculoskeletal: Positive arthritic pain  Neuropsychiatric: Denies any depression, syncope, tremors, seizures, anxiety or nervousness. Physical Exam:  Vitals /60 (Site: Left Upper Arm, Position: Sitting, Cuff Size: Large Adult)   Pulse 70   Temp 97.8 °F (36.6 °C) (Temporal)   Resp 16   Ht 5' 5\" (1.651 m)   Wt 195 lb (88.5 kg)   SpO2 97%   BMI 32.45 kg/m²   General Appearance: Alert, no distress; vital signs were reviewed today  Head: Normocephalic, atraumatic. Eyes:  Sclera clear, no drainage  Ears:  External auditory canals patent, no drainage  Neck: Supple, trachea midline, no adenopathy; Thyroid: No enlargement/tenderness/nodules;  Back: Symmetric, no tenderness  Lungs: Chest rises and falls symmetrically, no use of accessory muscles, Lungs clear throughout significantly diminished in the right lower lobe chest wall: No tenderness  Heart[de-identified] Irregularly irregular  Abdomen: Nontender, bowel sounds active in all 4 quadrants, no masses  Extremities: Extremities without clubbing,cyanosis. Unna boots in place unable to evaluate swelling. These were not removed as she will be seeing wound care after rest  skin: Skin color normal, no rashes or lesions  Neurological: Cranial nerves II-XII appears grossly intact- no focal deficit  Psychiatric: mood and affect within normal limits    Assessment/Plan    1. Bilateral lower leg cellulitis  Continue antibiotic until completed ongoing follow-up with wound care, following wound care with Unna boots  - Comprehensive Metabolic Panel; Future    2. Ulcer of left lower leg, limited to breakdown of skin ([address]  As noted above    3. Stage 3a chronic kidney disease (HCC)  Stable at present, follow-up labs    4. CHF (congestive heart failure), NYHA class I, acute on chronic, combined (HCC)  Chronic edema, continues on Lasix, metoprolol. Patient with significant decreased lung sounds USP on the right side.  chest x-ray today  - metoprolol tartrate (LOPRESSOR) 25 MG tablet; Take 0.5 tablets by mouth 2 times daily  Dispense: 45 tablet; Refill: 3  Face-to-face completed for wheelchair, manual.  Patient unable to walk short distances due to dyspnea on exertion    5. Atrial fibrillation, unspecified type ([address]  Rate controlled, continues on metoprolol. No anticoagulation per patient's request aware of risk  - metoprolol tartrate (LOPRESSOR) 25 MG tablet; Take 0.5 tablets by mouth 2 times daily  Dispense: 45 tablet; Refill: 3    6. Recurrent right pleural effusion  Chest x-ray today to rule out reoccurrence. - XR CHEST STANDARD (2 VW); Future    7. Moderate tricuspid regurgitation  Following with cardiology    8. Hypothyroidism, unspecified type  Continues on levothyroxine, recent TSH elevated however was noted she was not taking the medication correctly. Son is now administering the medication and she does have follow-up thyroid studies in place        Decision making of moderate complexity   Hospital records reviewed.    Face-to-face completed for home health   follow up for routine visit, call sooner with any concerns prior    Electronically signed by JESSICA Leal CNP on 7/5/2022 at 4:26 PM

## 2022-07-07 NOTE — TELEPHONE ENCOUNTER
OK to hold ASA for 5 days per Dr. Rebecca Olmos.
Patient is scheduled for a thoracentesis on Thursday, July 14th. Requesting permission for patient to discontinue daily aspirin for 5 days prior to procedure. Please advise if approve.
no difficulties

## 2022-07-11 NOTE — PLAN OF CARE
Problem: Chronic Conditions and Co-morbidities  Goal: Patient's chronic conditions and co-morbidity symptoms are monitored and maintained or improved  Outcome: Progressing     Problem: Cognitive:  Goal: Knowledge of wound care  Description: Knowledge of wound care  Outcome: Progressing  Goal: Understands risk factors for wounds  Description: Understands risk factors for wounds  Outcome: Progressing     Problem: Wound:  Goal: Will show signs of wound healing; wound closure and no evidence of infection  Description: Will show signs of wound healing; wound closure and no evidence of infection  Outcome: Progressing     Problem: Pressure Ulcer:  Goal: Signs of wound healing will improve  Description: Signs of wound healing will improve  Outcome: Progressing  Goal: Absence of new pressure ulcer  Description: Absence of new pressure ulcer  Outcome: Progressing  Goal: Will show no infection signs and symptoms  Description: Will show no infection signs and symptoms  Outcome: Progressing     Problem: Arterial:  Goal: Optimize blood flow for wound healing  Description: Optimize blood flow for wound healing  Outcome: Progressing     Problem: Venous:  Goal: Signs of wound healing will improve  Description: Signs of wound healing will improve  Outcome: Progressing     Problem: Smoking cessation:  Goal: Ability to formulate a plan to maintain a tobacco-free life will be supported  Description: Ability to formulate a plan to maintain a tobacco-free life will be supported  Outcome: Progressing     Problem: Compression therapy:  Goal: Will be free from complications associated with compression therapy  Description: Will be free from complications associated with compression therapy  Outcome: Progressing     Problem: Weight control:  Goal: Ability to maintain an optimal weight for height and age will be supported  Description: Ability to maintain an optimal weight for height and age will be supported  Outcome: Progressing

## 2022-07-11 NOTE — PROGRESS NOTES
Subjective:    Reuben Martinez is a 81 y.o. female who presents with the ulceration of the feet and legs. Patient has been compliant with compression therapy. Patient relates pain is Present. Pain is rated 1 out of 10 and is described as intermittent. Currently denies F/C/N/V    No Known Allergies    Past Medical History:   Diagnosis Date    Atrial fibrillation (HCC)     CAD (coronary artery disease)     CHF (congestive heart failure) (HCC)     Edema     Empyema (HCC)     Pleural effusion        Prior to Admission medications    Medication Sig Start Date End Date Taking? Authorizing Provider   Misc.  Devices MISC Electric lift chair        DX: CHF, A fib, pleural effusion 7/11/22   [facility]   metoprolol tartrate (LOPRESSOR) 25 MG tablet Take 0.5 tablets by mouth 2 times daily 7/5/22   [facility]   furosemide (LASIX) 20 MG tablet Take 1 tablet by mouth 2 times daily 7/5/22   [facility]   ciprofloxacin (CIPRO) 500 MG tablet Take 1 tablet by mouth 2 times daily for 10 days 7/3/22 7/13/22  Angella Palmer MD   levothyroxine (SYNTHROID) 25 MCG tablet Take 1.5 tablets by mouth Daily 5/31/22   [facility]   potassium chloride (KLOR-CON M) 10 MEQ extended release tablet Take 1 tablet by mouth daily 4/18/22 4/13/23  [facility]   aspirin 81 MG EC tablet Take 81 mg by mouth daily    Historical Provider, MD   GARLIC PO Take by mouth daily    Historical Provider, MD   miconazole (MICOTIN) 2 % powder Apply topically 2 times daily to under breasts 2/10/22   JESSICA Puri - NP   Multiple Vitamins-Minerals (CENTRUM SILVER PO) Take 1 tablet by mouth daily    Historical Provider, MD   Omega-3 Fatty Acids (FISH OIL PO) Take 1 tablet by mouth daily    Historical Provider, MD       Social History     Tobacco Use    Smoking status: Never Smoker    Smokeless tobacco: Never Used   Substance Use Topics    Alcohol use: Not Currently       Review of Systems: All 12 systems reviewed and pertinent positives noted above. Lower Extremity Physical Examination:     Vitals:   Vitals:    07/11/22 1415   BP: 108/60   Pulse: 68   Resp: 18   Temp: 98.2 °F (36.8 °C)     General: AAO x 3 in NAD. Vascular: DP and PT pulses palpable 2/4, bilateral.  CFT <3 seconds, bilateral.  Hair growth absent to the level of the digits, bilateral.  Edema present, bilateral.  Varicosities present, bilateral. Erythema absent, bilateral. Distal Rubor absent bilateral.  Temperature decreased bilateral. Hyperpigmentation present bilateral. Atrophic skin yes. Neurological: Sensation intact to light touch to level of digits, bilateral.  Protective sensation intact 10/10 sites via 5.07/10g [place]-Jerald Monofilament, bilateral.  negative Tinel's, bilateral.  negative Valleix sign, bilateral.  Vibratory intact bilateral.  Reflexes Decreased bilateral.  Paresthesias negative. Dysthesias negative. Sharp/dull intact bilateral.    Musculoskeletal: Muscle strength 5/5, bilateral.  Pain absent upon palpation bilateral. Normal medial longitudinal arch, bilateral.  Ankle ROM decreased,bilateral.  1st MPJ ROM within normal limits, bilateral.  Dorsally contracted digits absent. Mild bunion    Integument:   Open lesion present, Bilateral.  Area healed up a lot left second third fourth webspace . Anterior left leg is superficial ulcer positive serous drainage with healthy tissue base no signs of infection on periphery. Right medial first metatarsal head there is a superficial ulcer with a healthy tissue base no signs infection on periphery. No probing undermining no malodor or proximal streaking. Interdigital maceration present to web spaces 234, Left. Nails b/l  thickened, dystrophic and crumbly, discolored with subungual debris. Fissures absent, Bilateral. Hyperkeratotic tissue is absent.   Wound 06/27/22 Foot Anterior;Right #1 right leg  (Active)   Wound Image   07/05/22 1534   Dressing Status Old drainage noted 07/11/22 1415   Wound Cleansed Irrigated with saline 07/11/22 1415   Dressing/Treatment Foam;Zinc paste 07/11/22 1415   Offloading for Diabetic Foot Ulcers Offloading not ordered 07/11/22 1415   Dressing Change Due 07/18/22 07/11/22 1415   Wound Length (cm) 1.2 cm 07/11/22 1415   Wound Width (cm) 1.5 cm 07/11/22 1415   Wound Depth (cm) 0.1 cm 07/11/22 1415   Wound Surface Area (cm^2) 1.8 cm^2 07/11/22 1415   Change in Wound Size % (l*w) 9.09 07/11/22 1415   Wound Volume (cm^3) 0.18 cm^3 07/11/22 1415   Wound Healing % 9 07/11/22 1415   Wound Assessment Pink/red;Fibrin 07/11/22 1415   Drainage Amount Moderate 07/11/22 1415   Drainage Description Serosanguinous 07/11/22 1415   Odor None 07/11/22 1415   Anika-wound Assessment Maceration 07/11/22 1415   Margins Defined edges 07/11/22 1415   Wound Thickness Description not for Pressure Injury Partial thickness 07/11/22 1415   Number of days: 14       Wound 06/27/22 Pretibial Left #2 left leg (Active)   Wound Image   07/05/22 1534   Dressing Status Old drainage noted 07/11/22 1415   Wound Cleansed Cleansed with saline 07/11/22 1415   Dressing/Treatment Foam;Zinc paste 07/11/22 1415   Offloading for Diabetic Foot Ulcers Offloading not ordered 07/11/22 1415   Dressing Change Due 07/18/22 07/11/22 1415   Wound Length (cm) 1.8 cm 07/11/22 1415   Wound Width (cm) 1.2 cm 07/11/22 1415   Wound Depth (cm) 0.1 cm 07/11/22 1415   Wound Surface Area (cm^2) 2.16 cm^2 07/11/22 1415   Change in Wound Size % (l*w) 12.55 07/11/22 1415   Wound Volume (cm^3) 0.216 cm^3 07/11/22 1415   Wound Healing % 13 07/11/22 1415   Wound Assessment Superficial 07/11/22 1415   Drainage Amount Moderate 07/11/22 1415   Drainage Description Yellow 07/11/22 1415   Odor None 07/11/22 1415   Anika-wound Assessment Intact 07/11/22 1415   Margins Undefined edges 07/11/22 1415   Wound Thickness Description not for Pressure Injury Partial thickness 07/11/22 1415   Number of days: 14       Wound 06/27/22 Anterior; Left #4 left dorsal foot (Active)   Wound Image   07/05/22 1534   Dressing Status Old drainage noted 07/11/22 1415   Wound Cleansed Cleansed with saline 07/11/22 1415   Dressing/Treatment Foam;Zinc paste 07/11/22 1415   Offloading for Diabetic Foot Ulcers Offloading not ordered 07/11/22 1415   Dressing Change Due 07/18/22 07/11/22 1415   Wound Length (cm) 1.1 cm 07/05/22 1534   Wound Width (cm) 0.8 cm 07/05/22 1534   Wound Depth (cm) 0.1 cm 07/05/22 1534   Wound Surface Area (cm^2) 0.88 cm^2 07/05/22 1534   Change in Wound Size % (l*w) 80.44 07/05/22 1534   Wound Volume (cm^3) 0.088 cm^3 07/05/22 1534   Wound Healing % 90 07/05/22 1534   Wound Assessment Superficial 07/11/22 1415   Drainage Amount Moderate 07/11/22 1415   Drainage Description Yellow 07/11/22 1415   Odor None 07/11/22 1415   Anika-wound Assessment Intact 07/11/22 1415   Margins Undefined edges 07/11/22 1415   Wound Thickness Description not for Pressure Injury Partial thickness 07/11/22 1415   Number of days: 13       Wound 07/11/22 Pretibial Left; Lower (Active)   Dressing Status Old drainage noted 07/11/22 1415   Wound Cleansed Cleansed with saline 07/11/22 1415   Dressing/Treatment Foam;Zinc paste 07/11/22 1415   Offloading for Diabetic Foot Ulcers Offloading not ordered 07/11/22 1415   Dressing Change Due 07/18/22 07/11/22 1415   Wound Length (cm) 1.7 cm 07/11/22 1415   Wound Width (cm) 2.2 cm 07/11/22 1415   Wound Depth (cm) 0.1 cm 07/11/22 1415   Wound Surface Area (cm^2) 3.74 cm^2 07/11/22 1415   Wound Volume (cm^3) 0.374 cm^3 07/11/22 1415   Wound Assessment Superficial 07/11/22 1415   Drainage Amount Large 07/11/22 1415   Drainage Description Serosanguinous 07/11/22 1415   Odor None 07/11/22 1415   Anika-wound Assessment Intact 07/11/22 1415   Margins Attached edges 07/11/22 1415   Wound Thickness Description not for Pressure Injury Partial thickness 07/11/22 1415   Number of days: 0

## 2022-07-11 NOTE — TELEPHONE ENCOUNTER
From: Shanthi Nelson  To: Reema Geovany  Sent: 7/9/2022 7:59 PM EDT  Subject: Norm Helms     We had an episode this Friday morning which started Thursday and festered till cancellation of wound care Friday. She had a spell of being unable to accept her condition or unacceptance of her condition and was defiant and refusing to do what was the course of action. She sent my brother away and told me she had to have time to think and not to come up till Sunday I was to cancel wound care Friday. we tried to change her mind but to no avail. I went up today with some food and she said no and asked me to leave. I got home and about 15 min. later called and said she needed help. I went up and she had some issues and we had a good discussion. She agreed to wound care the 11th and the lung fluid extraction IF she can get a consult with Logan Greenwood who she is convinced did her lung drain 40 years ago because he only did it once and it worked. She hadn't been sleeping well as her lift chair we got for [employer] went out. we were renting one and she hated it. I talked her into letting   us get a new one and as she can't sleep in a bed or get up from a regular chair we were hoping you can write a script for one to try and get some reimbursement If any. That too was the only way we were allowed to get her a new one. sorry for this long summary but things are at a place she's insistent God told her to keep going for now but she is just tired and miserable. Its hard but we are willing to keep her at home and try and work her through it.  Nancy/ daughter

## 2022-07-11 NOTE — TELEPHONE ENCOUNTER
Sorry they had a bad end of week. I did print off a Rx for them to use to get a lift chair- I believe at [employer] they are able to get taxes written off with a RX. As for the thoracentesis these are usually done by our interventional radiologist. Unfortunately Dr Jeffry Parker does not come down here any further. Is she thinking more a palliative approach ? Keeping her comfortable in the home setting?

## 2022-07-14 NOTE — TELEPHONE ENCOUNTER
Last appt: 7/5/2022  Next appt: 9/6/2022    [facility] pharmacy called in saying that they need office note talking about wheelchair.   Just addend note from July 5 trhen fax to [facility]

## 2022-07-18 NOTE — PLAN OF CARE
Problem: Chronic Conditions and Co-morbidities  Goal: Patient's chronic conditions and co-morbidity symptoms are monitored and maintained or improved  Outcome: Progressing Towards Goal     Problem: Cognitive:  Goal: Knowledge of wound care  Description: Knowledge of wound care  Outcome: Progressing Towards Goal  Goal: Understands risk factors for wounds  Description: Understands risk factors for wounds  Outcome: Progressing Towards Goal     Problem: Wound:  Goal: Will show signs of wound healing; wound closure and no evidence of infection  Description: Will show signs of wound healing; wound closure and no evidence of infection  Outcome: Progressing Towards Goal     Problem: Pressure Ulcer:  Goal: Signs of wound healing will improve  Description: Signs of wound healing will improve  Outcome: Progressing Towards Goal  Goal: Absence of new pressure ulcer  Description: Absence of new pressure ulcer  Outcome: Progressing Towards Goal  Goal: Will show no infection signs and symptoms  Description: Will show no infection signs and symptoms  Outcome: Progressing Towards Goal     Problem: Arterial:  Goal: Optimize blood flow for wound healing  Description: Optimize blood flow for wound healing  Outcome: Progressing Towards Goal     Problem: Venous:  Goal: Signs of wound healing will improve  Description: Signs of wound healing will improve  Outcome: Progressing Towards Goal     Problem: Smoking cessation:  Goal: Ability to formulate a plan to maintain a tobacco-free life will be supported  Description: Ability to formulate a plan to maintain a tobacco-free life will be supported  Outcome: Progressing Towards Goal     Problem: Compression therapy:  Goal: Will be free from complications associated with compression therapy  Description: Will be free from complications associated with compression therapy  Outcome: Progressing Towards Goal     Problem: Weight control:  Goal: Ability to maintain an optimal weight for height and age will be supported  Description: Ability to maintain an optimal weight for height and age will be supported  Outcome: Progressing Towards Goal     Problem: Falls - Risk of:  Goal: Will remain free from falls  Description: Will remain free from falls  Outcome: Progressing Towards Goal     Problem: Blood Glucose:  Goal: Ability to maintain appropriate glucose levels will improve  Description: Ability to maintain appropriate glucose levels will improve  Outcome: Progressing Towards Goal

## 2022-07-18 NOTE — DISCHARGE INSTRUCTIONS
Today a compression dressing has been applied to your lower leg. Its purpose is to reduce swelling and help treat your wound. It will stay on until your next appointment. 1. It must stay dry. You can shower with a bag covering it or purchase a shower boot at  a medical supply store. 2. If the dressing falls more than 2 inches or gets wet, call us ([phone]) to come in  to have it changed. 3. If the top or bottom rolls, you can snip through it to relieve the constriction. 4. To help reduce swelling, when sitting elevate your leg, preferably to heart level. Avoid standing in one place for long periods of time. 5.  A rare complication is a decrease of arterial blood flow to your toes. If your   leg becomes more painful with numbness or tingling or a purple color to your toes,  carefully remove the dressing by cutting it off or unwrapping it. If normal sensation does not return to the limb, seek medical help. Follow up with wound care nurses on Friday as scheduled  Follow up with Dr. Keke Bateman in 1 week as scheduled. SIGNS OF INFECTION  - Redness, swelling, skin hot  - Wound bed turns black or stringy yellow  - Foul odor  - Increased drainage or pus  - Increased pain  - Fever greater than 100F    CALL YOUR DOCTOR OR SEEK MEDICAL ATTENTION IF SIGNS OF INFECTION.   DO NOT WAIT UNTIL YOUR NEXT APPOINTMENT    Call the Wound Care Nurse with any other questions or concerns- [phone]

## 2022-07-18 NOTE — PROGRESS NOTES
Take 1 tablet by mouth daily    Historical Provider, MD       Social History     Tobacco Use    Smoking status: Never    Smokeless tobacco: Never   Substance Use Topics    Alcohol use: Not Currently       Review of Systems: All 12 systems reviewed and pertinent positives noted above. Lower Extremity Physical Examination:     Vitals:   Vitals:    07/18/22 1400   BP: 115/68   Pulse: 64   Resp: 18   Temp: 97.7 °F (36.5 °C)     General: AAO x 3 in NAD. Vascular: DP and PT pulses palpable 2/4, bilateral.  CFT <3 seconds, bilateral.  Hair growth absent to the level of the digits, bilateral.  Edema present, bilateral.  Varicosities present, bilateral. Erythema absent, bilateral. Distal Rubor absent bilateral.  Temperature decreased bilateral. Hyperpigmentation present bilateral. Atrophic skin yes. Neurological: Sensation intact to light touch to level of digits, bilateral.  Protective sensation intact 10/10 sites via 5.07/10g [place] Monofilament, bilateral.  negative Tinel's, bilateral.  negative Valleix sign, bilateral.  Vibratory intact bilateral.  Reflexes Decreased bilateral.  Paresthesias negative. Dysthesias negative. Sharp/dull intact bilateral.    Musculoskeletal: Muscle strength 5/5, bilateral.  Pain absent upon palpation bilateral. Normal medial longitudinal arch, bilateral.  Ankle ROM decreased,bilateral.  1st MPJ ROM within normal limits, bilateral.  Dorsally contracted digits absent. Mild bunion    Integument:   Open lesion present, Bilateral.   Anterior left leg is superficial ulcer positive serous drainage with healthy tissue base no signs of infection on periphery. The first layer was a positive serous drainage seen also in the leg this week. Right medial first metatarsal head there is a superficial ulcer with a increased healthy tissue base no signs infection on periphery. No probing undermining no malodor or proximal streaking.   Interdigital maceration absent to web spaces 234, Left.  Nails b/l  thickened, dystrophic and crumbly, discolored with subungual debris. Fissures absent, Bilateral. Hyperkeratotic tissue is absent.   Wound 06/27/22 Foot Anterior;Right #1 right leg  (Active)   Wound Image   07/05/22 1534   Dressing Status Old drainage noted 07/18/22 1402   Wound Cleansed Irrigated with saline 07/18/22 1402   Dressing/Treatment Foam;Zinc paste 07/18/22 1402   Offloading for Diabetic Foot Ulcers Offloading not ordered 07/18/22 1402   Dressing Change Due 07/19/22 07/18/22 1402   Wound Length (cm) 1.6 cm 07/18/22 1402   Wound Width (cm) 1.1 cm 07/18/22 1402   Wound Depth (cm) 0.1 cm 07/18/22 1402   Wound Surface Area (cm^2) 1.76 cm^2 07/18/22 1402   Change in Wound Size % (l*w) 11.11 07/18/22 1402   Wound Volume (cm^3) 0.176 cm^3 07/18/22 1402   Wound Healing % 11 07/18/22 1402   Wound Assessment Pink/red;Fibrin 07/18/22 1402   Drainage Amount Moderate 07/18/22 1402   Drainage Description Serosanguinous 07/18/22 1402   Odor None 07/18/22 1402   Anika-wound Assessment Maceration 07/18/22 1402   Margins Defined edges 07/18/22 1402   Wound Thickness Description not for Pressure Injury Partial thickness 07/18/22 1402   Number of days: 21       Wound 06/27/22 Pretibial Left #2 left leg (Active)   Wound Image   07/05/22 1534   Dressing Status Old drainage noted 07/18/22 1402   Wound Cleansed Cleansed with saline 07/18/22 1402   Dressing/Treatment Foam;Zinc paste 07/18/22 1402   Offloading for Diabetic Foot Ulcers Offloading not ordered 07/18/22 1402   Dressing Change Due 07/19/22 07/18/22 1402   Wound Length (cm) 1.3 cm 07/18/22 1402   Wound Width (cm) 1.1 cm 07/18/22 1402   Wound Depth (cm) 0.1 cm 07/18/22 1402   Wound Surface Area (cm^2) 1.43 cm^2 07/18/22 1402   Change in Wound Size % (l*w) 42.11 07/18/22 1402   Wound Volume (cm^3) 0.143 cm^3 07/18/22 1402   Wound Healing % 42 07/18/22 1402   Wound Assessment Superficial 07/18/22 1402   Drainage Amount Moderate 07/18/22 1402   Drainage Description Yellow 07/18/22 1402   Odor None 07/18/22 1402   Anika-wound Assessment Intact 07/18/22 1402   Margins Undefined edges 07/18/22 1402   Wound Thickness Description not for Pressure Injury Partial thickness 07/18/22 1402   Number of days: 21       Wound 06/27/22 Anterior; Left #4 left dorsal foot (Active)   Wound Image   07/05/22 1534   Dressing Status Old drainage noted 07/18/22 1402   Wound Cleansed Cleansed with saline 07/18/22 1402   Dressing/Treatment Foam;Zinc paste 07/18/22 1402   Offloading for Diabetic Foot Ulcers Offloading not ordered 07/18/22 1402   Dressing Change Due 07/19/22 07/18/22 1402   Wound Length (cm) 1.5 cm 07/18/22 1402   Wound Width (cm) 1 cm 07/18/22 1402   Wound Depth (cm) 0.1 cm 07/18/22 1402   Wound Surface Area (cm^2) 1.5 cm^2 07/18/22 1402   Change in Wound Size % (l*w) 66.67 07/18/22 1402   Wound Volume (cm^3) 0.15 cm^3 07/18/22 1402   Wound Healing % 83 07/18/22 1402   Wound Assessment Superficial 07/18/22 1402   Drainage Amount Moderate 07/18/22 1402   Drainage Description Yellow 07/18/22 1402   Odor None 07/18/22 1402   Anika-wound Assessment Intact 07/18/22 1402   Margins Undefined edges 07/18/22 1402   Wound Thickness Description not for Pressure Injury Partial thickness 07/18/22 1402   Number of days: 20       Wound 07/11/22 Pretibial Left; Lower (Active)   Dressing Status Old drainage noted 07/18/22 1402   Wound Cleansed Cleansed with saline 07/18/22 1402   Dressing/Treatment Foam;Zinc paste 07/18/22 1402   Offloading for Diabetic Foot Ulcers Offloading not ordered 07/18/22 1402   Dressing Change Due 07/19/22 07/18/22 1402   Wound Length (cm) 2 cm 07/18/22 1402   Wound Width (cm) 1.8 cm 07/18/22 1402   Wound Depth (cm) 0.2 cm 07/18/22 1402   Wound Surface Area (cm^2) 3.6 cm^2 07/18/22 1402   Change in Wound Size % (l*w) 3.74 07/18/22 1402   Wound Volume (cm^3) 0.72 cm^3 07/18/22 1402   Wound Healing % 4 07/18/22 1402   Wound Assessment Superficial 07/18/22 1402   Drainage Amount Large 07/18/22 1402   Drainage Description Serosanguinous 07/18/22 1402   Odor None 07/18/22 1402   Anika-wound Assessment Intact 07/18/22 1402   Margins Attached edges 07/18/22 1402   Wound Thickness Description not for Pressure Injury Full thickness 07/18/22 1402   Number of days: 7         Asessment: Patient is a 81 y.o. female with:   Hospital Problems             Last Modified POA    Lymphedema of both lower extremities 7/18/2022 Yes    Ulcer of left lower leg, limited to breakdown of skin ([address] 7/18/2022 Yes    Ulcer of right foot, limited to breakdown of skin ([address] 7/18/2022 Yes     Ulcer Classification:  Venous insufficiency ulcer grade 1 C. IDSA  no infection. Plan:   Patient examined and evaluated. Current condition and treatment options discussed in detail. Topical lidocaine applied to wound. Debridement none needed. Dressing max absorbent. Patient advised importance of compression therapy and elevation of the leg and stressed the importance of this in regards to wound healing. Compression:Patient had an unna boot and coban applied to the Bilateral lower extremities. This was applied for control of leg edema. Patient educated on appropriate use and will keep the dressing dry and intact. Any increase in pain or if the dressing should fall down or slip, they should contact the clinic immediately. Also advised importance of continued elevation of the leg. Today's dressing:max absorbent  Continue Betadine 3 times daily to left second third fourth web spaces. For the lymphedema issue, pt should perform regular exercise, elevate his legs, and continue regular use of compression stockings long term. Contact clinic with any questions/problems/concerns or new signs of infection. Follow-up at [facility] in 1week(s).

## 2022-07-19 NOTE — TELEPHONE ENCOUNTER
Pts son called stating that they never received supplies ordered for the patient.  Please return call at [phone]

## 2022-07-19 NOTE — TELEPHONE ENCOUNTER
Called supply company, they received order form and supplies shipped and will be delivered 1-2 days. Notified patient son. Verbalized understanding.

## 2022-07-21 NOTE — TELEPHONE ENCOUNTER
Writer contacted ED provider Mraianela Mccartney to inform of 30 day readmission risk. ED provider informed writer of readmission.     Call Back: If you need to call back to inform of disposition you can contact me at [phone]

## 2022-07-21 NOTE — PROGRESS NOTES
responded to call from ED. Assessment: Patient requested spiritual care. She received a diagnosis which would require a difficult surgery. She decided against surgery and will enter the in-patient hospice center for comfort care. She appears alert and oriented. Two of her four children are present and fully support her decision. Intervention: Engaged in conversation with family and voiced support for their decision and support of each other.  provided a listening presence as they discussed contacting the remaining children Patient and family expressed appreciation for visit and offer of continued prayer. Plan: Chaplains are available on site or on call 24/7 for spiritual and emotional support.

## 2022-07-21 NOTE — FLOWSHEET NOTE
07/21/22 1752   Encounter Summary   Encounter Overview/Reason  Spiritual/Emotional Needs   Service Provided For: Patient and family together   Referral/Consult From: Patient   Support System Children;Family members; Yazidism/cayla community   Last Encounter  07/21/22   Complexity of Encounter Moderate   Begin Time 1702   End Time  1756   Total Time Calculated 54 min   Encounter    Type Family Care   Spiritual/Emotional needs   Type Spiritual Support   Grief, Loss, and Adjustments   Type Hospice   Assessment/Intervention/Outcome   Assessment Calm; Hopeful;Peaceful   Intervention Active listening;Discussed relationship with God;Empowerment;End of Life Care;Explored/Affirmed feelings, thoughts, concerns;Prayer (assurance of)/[place];Sustaining Presence/Ministry of presence

## 2022-07-21 NOTE — ED PROVIDER NOTES
[facility] ED  [address]  [place] [address]  Phone: [phone]        Pt Name: Shanthi Nelson  MRN: [id number]  Marcelinogfrory 1935  Date of evaluation: 7/21/22      CHIEF COMPLAINT     Chief Complaint   Patient presents with    Abdominal Pain    Shortness of Breath     Abd pain started today. HISTORY OF PRESENT ILLNESS  (Location/Symptom, Timing/Onset, Context/Setting, Quality, Duration, Modifying Factors, Severity.)    Shanthi Nelson is a 81 y.o. female who presents with abdominal pain. The patient states the abdominal pain started this morning. She states it is on both sides of her umbilicus. She reports that she ate [place] food yesterday, but it did not taste spoiled. She does report nausea, but no vomiting. No diarrhea or constipation. She states that she had a bowel movement yesterday, and it was normal for her. No prior abdominal surgery. Patient also complains of chest pain with shortness of breath. She denies coughing, but states that she is able to bring up phlegm without a cough. No recent travel. She has a known history of a current right-sided pleural effusion, and just had a thoracentesis on Friday. She has lower extremity edema and chronic wounds there. She has Unna boots in place. According to the family, she was also having visual hallucinations at home. She is on home oxygen, and the family had to increase her oxygen from 2 L to 3 L today. No fever or chills. REVIEW OF SYSTEMS    (2-9 systems for level 4, 10 or more for level 5)     Review of Systems   Constitutional:  Negative for chills and fever. HENT:  Negative for congestion, rhinorrhea and sore throat. Eyes:  Negative for photophobia and visual disturbance. Respiratory:  Positive for shortness of breath. Negative for cough. Cardiovascular:  Positive for chest pain, palpitations and leg swelling. Gastrointestinal:  Positive for abdominal pain and nausea.  Negative for constipation, diarrhea and vomiting. Genitourinary:  Negative for dysuria, frequency and urgency. Musculoskeletal:  Negative for back pain and neck pain. Skin:  Negative for rash and wound. Neurological:  Negative for dizziness, light-headedness and headaches. Hematological:  Negative for adenopathy. Does not bruise/bleed easily. Psychiatric/Behavioral:  Positive for hallucinations. PAST MEDICAL HISTORY    has a past medical history of Atrial fibrillation ([facility] CAD (coronary artery disease), CHF (congestive heart failure) ([facility] Edema, Empyema ([facility] and Pleural effusion. SURGICAL HISTORY      has a past surgical history that includes Cardioversion (02/10/2022). CURRENTMEDICATIONS       Previous Medications    ASPIRIN 81 MG EC TABLET    Take 81 mg by mouth daily    FUROSEMIDE (LASIX) 20 MG TABLET    Take 1 tablet by mouth 2 times daily    HANDICAP PLACARD MISC    Expires: 7/12/2025    LEVOTHYROXINE (SYNTHROID) 25 MCG TABLET    Take 1.5 tablets by mouth Daily    METOPROLOL TARTRATE (LOPRESSOR) 25 MG TABLET    Take 0.5 tablets by mouth 2 times daily    MICONAZOLE (MICOTIN) 2 % POWDER    Apply topically 2 times daily to under breasts    MISC. DEVICES [facility]) MISC    Use as directed    MISC. DEVICES MISC    Electric lift chair        DX: CHF, A fib, pleural effusion    MULTIPLE VITAMINS-MINERALS (CENTRUM SILVER PO)    Take 1 tablet by mouth daily    OMEGA-3 FATTY ACIDS (FISH OIL PO)    Take 1 tablet by mouth daily    POTASSIUM CHLORIDE (KLOR-CON M) 10 MEQ EXTENDED RELEASE TABLET    Take 1 tablet by mouth daily       ALLERGIES     has No Known Allergies. FAMILY HISTORY     She indicated that the status of her sister is unknown. She indicated that the status of her brother is unknown.     family history includes Cancer in her brother and sister. SOCIAL HISTORY      reports that she has never smoked.  She has never used smokeless tobacco. She reports that she does not currently use alcohol. She reports that she does not use drugs. PHYSICAL EXAM    (up to 7 for level 4, 8 or more for level 5)   INITIAL VITALS:  height is 5' 5\" (1.651 m) and weight is 191 lb 11.2 oz (87 kg). Her tympanic temperature is 98 °F (36.7 °C). Her blood pressure is 112/65 and her pulse is 107 (abnormal). Her respiration is 32 (abnormal) and oxygen saturation is 94%. Physical Exam  Vitals and nursing note reviewed. Constitutional:       Appearance: She is well-developed. HENT:      Head: Normocephalic and atraumatic. Cardiovascular:      Rate and Rhythm: Tachycardia present. Rhythm irregular. Heart sounds: No murmur heard. Pulmonary:      Effort: Tachypnea, accessory muscle usage and retractions present. Breath sounds: Examination of the left-upper field reveals decreased breath sounds. Examination of the left-middle field reveals decreased breath sounds. Examination of the left-lower field reveals decreased breath sounds. Decreased breath sounds present. No wheezing, rhonchi or rales. Abdominal:      General: Abdomen is flat. Bowel sounds are normal.      Palpations: Abdomen is soft. Tenderness: There is abdominal tenderness in the periumbilical area, left upper quadrant and left lower quadrant. Skin:     General: Skin is warm and dry. Capillary Refill: Capillary refill takes less than 2 seconds. Neurological:      General: No focal deficit present. Mental Status: She is alert and oriented to person, place, and time. DIFFERENTIAL DIAGNOSIS/ MDM:     41-year-old female presents with sudden onset abdominal pain. Found to have pneumoperitoneum on CT abdomen and pelvis, from an unclear source. I discussed the patient with our surgeon in [address], Dr. Emma Bro. He states that the patient would be better served by going to a higher level of care.     After extensive discussion at the bedside with the patient and her power of , the patient has decided to go to hospice rather than to a higher level of care for surgery. The patient not wish to have any heroic measures undertaken. Hospice consult placed. DIAGNOSTIC RESULTS     EKG: All EKG's are interpreted by the Emergency Department Physician who either signs or Co-signs this chart in the absence of a cardiologist.    Interpreted by emergency department physician    Rhythm: atrial fibrillation - rapid  Rate: tachycardia and 100-110  Axis: right  Ectopy: none  Conduction: normal  ST Segments: nonspecific changes  T Waves: non specific changes  Q Waves: none    Clinical Impression: atrial fibrillation (chronic)    Fay Johnson MD    RADIOLOGY:        Interpretation per the Radiologist below, if available at the time of this note:      Result Date: 7/21/2022  EXAMINATION: TWO XRAY VIEWS OF THE CHEST 7/21/2022 3:35 pm COMPARISON: 07/14/2022 HISTORY: ORDERING SYSTEM PROVIDED HISTORY: shortness of breath, chest pain TECHNOLOGIST PROVIDED HISTORY: shortness of breath, chest pain Reason for Exam: shortness of breath, chest pain FINDINGS: Large right pleural effusion. Heart size stable. Left lung clear. No pneumothorax. Large right pleural effusion     CT ABDOMEN PELVIS W IV CONTRAST Additional Contrast? None    Result Date: 7/21/2022  EXAMINATION: CT OF THE ABDOMEN AND PELVIS WITH CONTRAST 7/21/2022 3:25 pm TECHNIQUE: CT of the abdomen and pelvis was performed with the administration of intravenous contrast. Multiplanar reformatted images are provided for review. Automated exposure control, iterative reconstruction, and/or weight based adjustment of the mA/kV was utilized to reduce the radiation dose to as low as reasonably achievable. COMPARISON: None.  HISTORY: ORDERING SYSTEM PROVIDED HISTORY: abdominal pain TECHNOLOGIST PROVIDED HISTORY: abdominal pain Decision Support Exception - unselect if not a suspected or confirmed emergency medical condition->Emergency Medical Condition (MA) Reason for Exam: c/o mid abd pain FINDINGS: Lower Chest: There is a large right pleural effusion, with significant compressive atelectasis of the right middle lobe and right lower lobe. There is a small left pleural effusion. There is extensive edema of the lower chest wall. Organs: There is moderate amount of ascites. There is pneumoperitoneum. If there is no history of recent surgery or instrumentation, then a perforated bowel is suspected. The the liver, gallbladder, pancreas and spleen appear normal.  The adrenal glands and kidneys are unremarkable. GI/Bowel: Stomach, duodenum and small bowel loops are unremarkable. There is diverticular disease of the descending and sigmoid colon. Pelvis: Extensive bilateral pelvic varicosities are noted which could be a cause of pelvic congestion syndrome. The bladder, a uterus and rectum appear normal. Peritoneum/Retroperitoneum: Unremarkable Bones/Soft Tissues: There is extensive edema of the pelvic in abdominal wall, in keeping with anasarca. Pneumoperitoneum. If there is no history of recent surgery or bowel instrumentation than this is in keeping with bowel perforation. The duodenum appears unremarkable. This implies perforation of the colon. Large right pleural effusion with compressive atelectasis in the right lung. Small left pleural effusion. Moderate ascites. Extensive edema of the thoracic, abdominal and pelvic walls, in keeping with anasarca. There is diverticular disease of the descending and sigmoid colon. Extensive bilateral pelvic varicosities are noted, which could be a cause of pelvic congestion syndrome. I discussed the case with Dr. Madhavi Luke at 4:07 p.m., 07/21/2022           LABS:  Results for orders placed or performed during the hospital encounter of 07/21/22   COVID-19, Rapid    Specimen: Nasopharyngeal Swab   Result Value Ref Range    Specimen Description . NASOPHARYNGEAL SWAB     SARS-CoV-2, Rapid Not Detected Not Detected   Basic Metabolic Panel   Result Value Ref Range TO 4 0 - 4 /HPF    RBC, UA 0 TO 4 0 - 4 /HPF    Epithelial Cells UA 25 TO 50 0 - 5 /HPF    Bacteria, UA TRACE (A) None   Hepatic Function Panel   Result Value Ref Range    Albumin 3.4 (L) 3.5 - 5.2 g/dL    Alkaline Phosphatase 139 (H) 35 - 104 U/L    ALT 25 5 - 33 U/L    AST 43 (H) <32 U/L    Total Bilirubin 2.77 (H) 0.3 - 1.2 mg/dL    Bilirubin, Direct 1.35 (H) <0.31 mg/dL    Bilirubin, Indirect 1.42 (H) 0.00 - 1.00 mg/dL    Total Protein 6.7 6.4 - 8.3 g/dL    Globulin 3.3 1.5 - 3.8 g/dL    Albumin/Globulin Ratio 1.0 1.0 - 2.5   Lipase   Result Value Ref Range    Lipase 28 13 - 60 U/L   Protime-INR   Result Value Ref Range    Protime 14.3 (H) 11.5 - 14.2 sec    INR 1.2    APTT   Result Value Ref Range    PTT 28.6 23.9 - 33.8 sec   Lactate, Sepsis   Result Value Ref Range    Lactic Acid, Sepsis 2.5 (H) 0.5 - 1.9 mmol/L   EKG 12 Lead   Result Value Ref Range    Ventricular Rate 107 BPM    Atrial Rate 120 BPM    QRS Duration 78 ms    Q-T Interval 314 ms    QTc Calculation (Bazett) 419 ms    R Axis 102 degrees    T Axis -37 degrees       Elevated lactic acid    EMERGENCY DEPARTMENT COURSE:   Vitals:    Vitals:    07/21/22 1430 07/21/22 1445 07/21/22 1500 07/21/22 1515   BP: 115/85 112/65     Pulse: (!) 113 (!) 105 (!) 113 (!) 107   Resp: (!) 31 29 25 (!) 32   Temp:       TempSrc:       SpO2: 93% 92% 94%    Weight:       Height:         -------------------------  BP: 112/65, Temp: 98 °F (36.7 °C), Heart Rate: (!) 107, Resp: (!) 32      RE-EVALUATION:  4:23 PM EDT  Patient updated on test results. We discussed the fact that she would need exploratory surgery to find out why she has free air on her abdominal CT. She is aware this is a life-threatening problem. She would like time to decide if she would rather go to [place] for further care or to go to Hospice. Holding blood cultures and antibiotics while the patient discusses with her family. 4:32 PM EDT  All family at bedside. All questions answered.      4:39 PM EDT  Patient has decided that she wishes to go to hospice. She has capacity to make medical decisions. Her daughter and son are at bedside and they are the patient's POAs. They are in agreement. CONSULTS:  General Surgery  I dicussed the patient with Dr Jae Little. He recommends transfer to a higher level of care. Hospice     PROCEDURES:  None    FINAL IMPRESSION      1. Pneumoperitoneum          DISPOSITION/PLAN   DISPOSITION Decision To Discharge 07/21/2022 04:42:53 PM      CONDITION ON DISPOSITION:   Fair    PATIENT REFERRED TO:  No follow-up provider specified.     DISCHARGE MEDICATIONS:  New Prescriptions    No medications on file       (Please note that portions of this note were completed with a voicerecognition program.  Efforts were made to edit the dictations but occasionally words are mis-transcribed.)    Donna Doyle MD  Attending Emergency Medicine Physician       Donna Doyle MD  07/21/22 1588

## 2022-07-21 NOTE — ED NOTES
Dr. Erica García speaking to family and POA in regards to plan of care.      Calli De La Cruz RN  07/21/22 3716

## 2022-07-23 LAB
EKG ATRIAL RATE: 120 BPM
EKG Q-T INTERVAL: 314 MS
EKG QRS DURATION: 78 MS
EKG QTC CALCULATION (BAZETT): 419 MS
EKG R AXIS: 102 DEGREES
EKG T AXIS: -37 DEGREES
EKG VENTRICULAR RATE: 107 BPM

## 2022-08-15 LAB
CULTURE: NORMAL
DIRECT EXAM: NORMAL
SPECIMEN DESCRIPTION: NORMAL